# Patient Record
Sex: FEMALE | Race: WHITE | NOT HISPANIC OR LATINO | Employment: OTHER | RURAL
[De-identification: names, ages, dates, MRNs, and addresses within clinical notes are randomized per-mention and may not be internally consistent; named-entity substitution may affect disease eponyms.]

---

## 2017-01-09 ENCOUNTER — LAB (OUTPATIENT)
Dept: FAMILY MEDICINE CLINIC | Facility: CLINIC | Age: 72
End: 2017-01-09

## 2017-01-09 DIAGNOSIS — K21.9 GASTROESOPHAGEAL REFLUX DISEASE, ESOPHAGITIS PRESENCE NOT SPECIFIED: Chronic | ICD-10-CM

## 2017-01-09 DIAGNOSIS — E78.5 HYPERLIPIDEMIA, UNSPECIFIED HYPERLIPIDEMIA TYPE: Chronic | ICD-10-CM

## 2017-01-09 DIAGNOSIS — Z78.0 MENOPAUSE: Chronic | ICD-10-CM

## 2017-01-09 DIAGNOSIS — Z00.00 WELLNESS EXAMINATION: ICD-10-CM

## 2017-01-09 DIAGNOSIS — E55.9 UNSPECIFIED VITAMIN D DEFICIENCY: ICD-10-CM

## 2017-01-09 DIAGNOSIS — M85.80 OSTEOPENIA: Primary | Chronic | ICD-10-CM

## 2017-01-09 LAB
25(OH)D3+25(OH)D2 SERPL-MCNC: 20.7 NG/ML (ref 30–100)
ALBUMIN SERPL-MCNC: 4.9 G/DL (ref 3.5–5)
ALBUMIN/GLOB SERPL: 1.8 G/DL (ref 1.1–2.5)
ALP SERPL-CCNC: 83 U/L (ref 24–120)
ALT SERPL-CCNC: 48 U/L (ref 0–54)
AST SERPL-CCNC: 26 U/L (ref 7–45)
BASOPHILS # BLD AUTO: 0.04 10*3/MM3 (ref 0–0.2)
BASOPHILS NFR BLD AUTO: 0.7 % (ref 0–2)
BILIRUB SERPL-MCNC: 0.6 MG/DL (ref 0.1–1)
BUN SERPL-MCNC: 12 MG/DL (ref 5–21)
BUN/CREAT SERPL: 19 (ref 7–25)
CALCIUM SERPL-MCNC: 10.1 MG/DL (ref 8.4–10.4)
CHLORIDE SERPL-SCNC: 100 MMOL/L (ref 98–110)
CHOLEST SERPL-MCNC: 248 MG/DL (ref 130–200)
CO2 SERPL-SCNC: 27 MMOL/L (ref 24–31)
CREAT SERPL-MCNC: 0.63 MG/DL (ref 0.5–1.4)
EOSINOPHIL # BLD AUTO: 0.12 10*3/MM3 (ref 0–0.7)
EOSINOPHIL NFR BLD AUTO: 2.1 % (ref 0–4)
ERYTHROCYTE [DISTWIDTH] IN BLOOD BY AUTOMATED COUNT: 14 % (ref 12–15)
GLOBULIN SER CALC-MCNC: 2.7 GM/DL
GLUCOSE SERPL-MCNC: 96 MG/DL (ref 70–100)
HCT VFR BLD AUTO: 43.5 % (ref 37–47)
HDLC SERPL-MCNC: 55 MG/DL
HGB BLD-MCNC: 14.4 G/DL (ref 12–16)
LDLC SERPL CALC-MCNC: 160 MG/DL (ref 0–99)
LYMPHOCYTES # BLD AUTO: 1.68 10*3/MM3 (ref 0.72–4.86)
LYMPHOCYTES NFR BLD AUTO: 29.6 % (ref 15–45)
MCH RBC QN AUTO: 28.4 PG (ref 28–32)
MCHC RBC AUTO-ENTMCNC: 33.1 G/DL (ref 33–36)
MCV RBC AUTO: 85.8 FL (ref 82–98)
MONOCYTES # BLD AUTO: 0.39 10*3/MM3 (ref 0.19–1.3)
MONOCYTES NFR BLD AUTO: 6.9 % (ref 4–12)
NEUTROPHILS # BLD AUTO: 3.44 10*3/MM3 (ref 1.87–8.4)
NEUTROPHILS NFR BLD AUTO: 60.7 % (ref 39–78)
PLATELET # BLD AUTO: 255 10*3/MM3 (ref 130–400)
POTASSIUM SERPL-SCNC: 4 MMOL/L (ref 3.5–5.3)
PROT SERPL-MCNC: 7.6 G/DL (ref 6.3–8.7)
RBC # BLD AUTO: 5.07 10*6/MM3 (ref 4.2–5.4)
SODIUM SERPL-SCNC: 141 MMOL/L (ref 135–145)
TRIGL SERPL-MCNC: 166 MG/DL (ref 0–149)
TSH SERPL DL<=0.005 MIU/L-ACNC: 2.4 MIU/ML (ref 0.47–4.68)
VLDLC SERPL CALC-MCNC: 33.2 MG/DL
WBC # BLD AUTO: 5.67 10*3/MM3 (ref 4.8–10.8)

## 2017-01-09 NOTE — MR AVS SNAPSHOT
Nikki Santillan   2017 8:10 AM   Lab    Dept Phone:  [phone]   Encounter #:  [id number]    Provider:  LAURY [facility]   Department:  [facility] MEDICINE                Your Full Care Plan              Your Updated Medication List      Notice  As of 2017 10:00 AM    You have not been prescribed any medications.            We Performed the Following     CBC & Differential     Comprehensive Metabolic Panel     Lipid Panel     TSH     Vitamin D 25 Hydroxy       You Were Diagnosed With        Codes Comments    Osteopenia    -  Primary ICD-10-CM: M85.80  ICD-9-CM: 733.90     Gastroesophageal reflux disease, esophagitis presence not specified     ICD-10-CM: K21.9  ICD-9-CM: 530.81     Hyperlipidemia, unspecified hyperlipidemia type     ICD-10-CM: E78.5  ICD-9-CM: 272.4     Menopause     ICD-10-CM: Z78.0  ICD-9-CM: 627.2     Wellness examination     ICD-10-CM: Z00.00  ICD-9-CM: V70.0     Unspecified vitamin D deficiency     ICD-10-CM: E55.9  ICD-9-CM: 268.9       Instructions     None    Patient Instructions History      Upcoming Appointments     Visit Type Date Time Department    LAB 2017  8:10 AM [facility]    OFFICE VISIT 2017  9:30 AM [facility]      [employer] Signup     [facility] [employer] allows you to send messages to your doctor, view your test results, renew your prescriptions, schedule appointments, and more. To sign up, go to [employer] and click on the Sign Up Now link in the New User? box. Enter your [employer] Activation Code exactly as it appears below along with the last four digits of your Social Security Number and your Date of Birth () to complete the sign-up process. If you do not sign up before the expiration date, you must request a new code.    [employer] Activation Code: [email or web address]  Expires: 2017 10:00 AM    If you have questions, you can email [email or web address] or call [phone]  to talk to our MyChart staff. Remember, M-DISChart is NOT to be used for urgent needs. For medical emergencies, dial 911.               Other Info from Your Visit           Your Appointments     Jan 12, 2017  9:30 AM CST   Office Visit with Gael Carrillo MD   [facility]ICINE (--)    [address] [place] [id number] [phone]           Arrive 15 minutes prior to appointment.              Allergies     Not on File      Problems and Diagnoses Noted     Acid reflux disease    High cholesterol or triglycerides    Menopause    Bone disease    Wellness examination    Vitamin D deficiency

## 2017-01-11 ENCOUNTER — TELEPHONE (OUTPATIENT)
Dept: GASTROENTEROLOGY | Facility: CLINIC | Age: 72
End: 2017-01-11

## 2017-01-11 NOTE — TELEPHONE ENCOUNTER
----- Message from Christiano Neely sent at 1/4/2017  2:30 PM CST -----  Regarding: RECALL  PT CALLED SAYING THAT HER MOTHER IS SICK RIGHT NOW AND SHE CANNOT HAVE A SCOPE DONE AT THIS TIME. SHE WILL CALL WHEN SHE WANTS TO SCHEDULE IT, PER  MESSAGE.

## 2017-01-12 ENCOUNTER — OFFICE VISIT (OUTPATIENT)
Dept: FAMILY MEDICINE CLINIC | Facility: CLINIC | Age: 72
End: 2017-01-12

## 2017-01-12 VITALS
DIASTOLIC BLOOD PRESSURE: 80 MMHG | HEIGHT: 64 IN | TEMPERATURE: 98.6 F | WEIGHT: 145 LBS | BODY MASS INDEX: 24.75 KG/M2 | OXYGEN SATURATION: 97 % | HEART RATE: 72 BPM | RESPIRATION RATE: 16 BRPM | SYSTOLIC BLOOD PRESSURE: 140 MMHG

## 2017-01-12 DIAGNOSIS — M54.9 CHRONIC BACK PAIN, UNSPECIFIED BACK LOCATION, UNSPECIFIED BACK PAIN LATERALITY: Chronic | ICD-10-CM

## 2017-01-12 DIAGNOSIS — M85.80 OSTEOPENIA: Chronic | ICD-10-CM

## 2017-01-12 DIAGNOSIS — R93.89 ABNORMAL X-RAY: ICD-10-CM

## 2017-01-12 DIAGNOSIS — R05.9 COUGH: ICD-10-CM

## 2017-01-12 DIAGNOSIS — K21.9 GASTROESOPHAGEAL REFLUX DISEASE, ESOPHAGITIS PRESENCE NOT SPECIFIED: Primary | Chronic | ICD-10-CM

## 2017-01-12 DIAGNOSIS — F41.9 ANXIETY: Chronic | ICD-10-CM

## 2017-01-12 DIAGNOSIS — G89.29 CHRONIC BACK PAIN, UNSPECIFIED BACK LOCATION, UNSPECIFIED BACK PAIN LATERALITY: Chronic | ICD-10-CM

## 2017-01-12 DIAGNOSIS — E78.5 HYPERLIPIDEMIA, UNSPECIFIED HYPERLIPIDEMIA TYPE: Chronic | ICD-10-CM

## 2017-01-12 DIAGNOSIS — F32.A DEPRESSION, UNSPECIFIED DEPRESSION TYPE: Chronic | ICD-10-CM

## 2017-01-12 PROCEDURE — 99213 OFFICE O/P EST LOW 20 MIN: CPT | Performed by: FAMILY MEDICINE

## 2017-01-12 RX ORDER — PANTOPRAZOLE SODIUM 40 MG/1
40 TABLET, DELAYED RELEASE ORAL DAILY
COMMUNITY
End: 2017-01-12 | Stop reason: SDUPTHER

## 2017-01-12 RX ORDER — PRAVASTATIN SODIUM 40 MG
40 TABLET ORAL NIGHTLY
Qty: 90 TABLET | Refills: 1 | Status: SHIPPED | OUTPATIENT
Start: 2017-01-12 | End: 2017-08-09 | Stop reason: SDUPTHER

## 2017-01-12 RX ORDER — PANTOPRAZOLE SODIUM 40 MG/1
TABLET, DELAYED RELEASE ORAL
Qty: 90 TABLET | Refills: 1 | Status: SHIPPED | OUTPATIENT
Start: 2017-01-12 | End: 2017-07-16 | Stop reason: SDUPTHER

## 2017-01-12 RX ORDER — PRAVASTATIN SODIUM 40 MG
40 TABLET ORAL NIGHTLY
COMMUNITY
End: 2017-01-12 | Stop reason: SDUPTHER

## 2017-01-12 RX ORDER — ALBUTEROL SULFATE 90 UG/1
2 AEROSOL, METERED RESPIRATORY (INHALATION) AS NEEDED
COMMUNITY
End: 2019-01-28 | Stop reason: SDUPTHER

## 2017-01-12 RX ORDER — CITALOPRAM 20 MG/1
20 TABLET ORAL DAILY
Qty: 30 TABLET | Refills: 5 | Status: SHIPPED | OUTPATIENT
Start: 2017-01-12 | End: 2017-07-03 | Stop reason: SDUPTHER

## 2017-01-12 NOTE — PATIENT INSTRUCTIONS
Keep up with colonoscopy eventually  Let us know if Celexa does not help; it should  Vit D especially this time of year  Repeat xray chest due May/June

## 2017-01-12 NOTE — PROGRESS NOTES
Subjective   Nikki Santillan is a 71 y.o. female presenting with chief complaint of:   Chief Complaint   Patient presents with   • Heartburn   • Hyperlipidemia   • Osteopenia       History of Present Illness : Alone. Many chronic problems; interval appointment.  One includes hyperlipidemia.  She has treated this for years with pravachol; without problems. Labs are done at least yearly and show response.   She has been briefly out of Rx at time of lab below.     Other chronic problem/s to consider:  Chronic back pain:  The pain has been present for years/over a year.   It is chronic.   The pain is stable.  0-3 /10 pain most of time.  The pain limits activities.  The problem has been evaulated and the patient has no desire more testing/change in approach.  Has learned to live with the problem.   Osteoporosis:  This has been present for years/over a year.  It is chronic.   They are using the following medications:     The next Bone Density Test is will be  1-2 yrs  Abnormal lab/xray:    Cough:  Possible chronic. Pneumonia last year and coughed along time after that.  Still today on/off. Saw pulmonary; no definite opinion.  May have component of reflux; takes protonix chronically.      The following portions of the patient's history were reviewed and updated as appropriate: allergies, current medications, past family history, past medical history, past social history, past surgical history and problem list.  TCC migrated      Current Outpatient Prescriptions:   •  albuterol (PROVENTIL HFA;VENTOLIN HFA) 108 (90 BASE) MCG/ACT inhaler, Inhale 2 puffs 4 (Four) Times a Day As Needed for wheezing or shortness of air., Disp: , Rfl:   •  pantoprazole (PROTONIX) 40 MG EC tablet, Take 40 mg by mouth Daily., Disp: , Rfl:   •  pravastatin (PRAVACHOL) 40 MG tablet, Take 40 mg by mouth Every Night., Disp: , Rfl:     Allergies   Allergen Reactions   • Bacitracin-Neomycin-Polymyxin Itching   • Hydromorphone Hcl Other (See Comments)      Oxygen stats dropped & mental altered status   • Promethazine Hcl Anxiety   • Other Rash     Neodecadron eye drop     • Sulfacetamide-Prednisolone Rash         ROS:  GENERAL:  Active/slower with limits, speed, samni for age. Sleeps ok. No fever.  ENDO:  No syncope, near or diaphoretic sweaty spells.    HEENT: No head injury or headache,  No vision change, No hearing loss/pain/drainage.  No sore throat.  No nasal/sinus congestion/drainage. No epistaxis.  CHEST: No chest wall tenderness or mass. Frequent cough without wheeze, SOB or hemoptysis.  CV: No chest pain, palpatations, ankle edema.  GI: No heartburn, dysphagia, abdominal pain, diarrhea, constipation, rectal bleeding, or melena.  Colonoscopy+biop-polyp/LH/Bod/12.16.11/5y pp- she is aware; waiting till march    :  Voids without dysuria, or incontience to completion.  ORTHO: No painful/swollen joints but various on /off sore.  No change occ sore neck or back.  No acute neck or back pain without recent injury.   NEURO: No dizziness, weakness of extremities.  No numbness/parethesias.   PSYCH: No memory loss.  Mood both anxious, depressed; mother ill/in nursing home and using a lot of energy to take care of her.  Not suicidal.  Tolerated stress.   Review of Systems    Results for orders placed or performed in visit on 01/09/17   Vitamin D 25 Hydroxy   Result Value Ref Range    25 Hydroxy, Vitamin D 20.7 (L) 30.0 - 100.0 ng/mL   TSH   Result Value Ref Range    TSH 2.400 0.470 - 4.680 mIU/mL   Lipid Panel   Result Value Ref Range    Total Cholesterol 248 (H) 130 - 200 mg/dL    Triglycerides 166 (H) 0 - 149 mg/dL    HDL Cholesterol 55 >=50 mg/dL    VLDL Cholesterol 33.2 mg/dL    LDL Cholesterol  160 (H) 0 - 99 mg/dL   Comprehensive Metabolic Panel   Result Value Ref Range    Glucose 96 70 - 100 mg/dL    BUN 12 5 - 21 mg/dL    Creatinine 0.63 0.50 - 1.40 mg/dL    eGFR Non African Am 93 >60 mL/min/1.73    eGFR African Am 113 >60 mL/min/1.73    BUN/Creatinine Ratio  "19.0 7.0 - 25.0    Sodium 141 135 - 145 mmol/L    Potassium 4.0 3.5 - 5.3 mmol/L    Chloride 100 98 - 110 mmol/L    Total CO2 27.0 24.0 - 31.0 mmol/L    Calcium 10.1 8.4 - 10.4 mg/dL    Total Protein 7.6 6.3 - 8.7 g/dL    Albumin 4.90 3.50 - 5.00 g/dL    Globulin 2.7 gm/dL    A/G Ratio 1.8 1.1 - 2.5 g/dL    Total Bilirubin 0.6 0.1 - 1.0 mg/dL    Alkaline Phosphatase 83 24 - 120 U/L    AST (SGOT) 26 7 - 45 U/L    ALT (SGPT) 48 0 - 54 U/L   CBC & Differential   Result Value Ref Range    WBC 5.67 4.80 - 10.80 10*3/mm3    RBC 5.07 4.20 - 5.40 10*6/mm3    Hemoglobin 14.4 12.0 - 16.0 g/dL    Hematocrit 43.5 37.0 - 47.0 %    MCV 85.8 82.0 - 98.0 fL    MCH 28.4 28.0 - 32.0 pg    MCHC 33.1 33.0 - 36.0 g/dL    RDW 14.0 12.0 - 15.0 %    Platelets 255 130 - 400 10*3/mm3    Neutrophil Rel % 60.7 39.0 - 78.0 %    Lymphocyte Rel % 29.6 15.0 - 45.0 %    Monocyte Rel % 6.9 4.0 - 12.0 %    Eosinophil Rel % 2.1 0.0 - 4.0 %    Basophil Rel % 0.7 0.0 - 2.0 %    Neutrophils Absolute 3.44 1.87 - 8.40 10*3/mm3    Lymphocytes Absolute 1.68 0.72 - 4.86 10*3/mm3    Monocytes Absolute 0.39 0.19 - 1.30 10*3/mm3    Eosinophils Absolute 0.12 0.00 - 0.70 10*3/mm3    Basophils Absolute 0.04 0.00 - 0.20 10*3/mm3       No results found for: HGBA1C    Lab Results   Component Value Date     01/09/2017    K 4.0 01/09/2017     01/09/2017    CO2 27.0 01/09/2017    BUN 12 01/09/2017    CREATININE 0.63 01/09/2017    CALCIUM 10.1 01/09/2017       No results found for: PSA     Objective   Visit Vitals   • /80 (BP Location: Left arm, Patient Position: Sitting, Cuff Size: Adult)   • Pulse 72   • Temp 98.6 °F (37 °C) (Oral)   • Resp 16   • Ht 64\" (162.6 cm)   • Wt 145 lb (65.8 kg)   • SpO2 97%   • BMI 24.89 kg/m2       EXAM:  GENERAL:  Well nourished/developed  in no acute distress.   SKIN: Turgor excellent, without wound, rash, lesion.   HEENT: Normal cephalic without trauma.  Pupils equal round reactive to light. Extraocular motions full " without nystagmus.   External canals nonobstructive nontender without reddness. Tymphatic membranes jenny with celia structures intact.   Oral cavity without growths, exudates, and moist.  Posterior pharnyx without mass, obstruction, reddness.  No thyroidmegaly, mass, tenderness, lymphadenopathy and supple.   CV: Regular rhythm.  No murmur, gallop, edema. Posterior pulses intact.  No carotid bruits.   CHEST: No chest wall tenderness or mass.   LUNGS: Symmetric motion with clear to auscultation.  No dullness to percussion.   ABD: Soft, nontender without mass.   ORTHO: Symmetric extremities without swelling/point tenderness.  Full gross range of motion.    NEURO: CN 2-12 grossly intact.  Symmetric facies. 1/4 x 4 biceps, knees equal reflexes.  UE/LE   4-/5 strength throughout.  Nonfocal use extremities.   PSYCH: Oriented x 3.  Pleasant calm, well kept.  Purposeful/directed conservation with intact short/long gross memory.   Physical Exam  Assessment/Plan       1. Gastroesophageal reflux disease, esophagitis presence not specified  Part of cough?    2. Chronic back pain, unspecified back location, unspecified back pain laterality  DDD/DJD; getting by    3. Osteopenia  followed    4. Anxiety  On/off; worse lately  - citalopram (CELEXA) 20 MG tablet; Take 1 tablet by mouth Daily.  Dispense: 30 tablet; Refill: 5    5. Depression, unspecified depression type  same  - citalopram (CELEXA) 20 MG tablet; Take 1 tablet by mouth Daily.  Dispense: 30 tablet; Refill: 5    6. Hyperlipidemia, unspecified hyperlipidemia type  pravachol treated    7. Cough  ? Chronic; to follow    8. Abnormal x-ray  Benign to date; still following      Rx: reviewed/see above and:  LAB: reviewed past TCC as needed, above and new orders:  Wrap-up/other instructions  Patient Instructions   Keep up with colonoscopy eventually  Let us know if Celexa does not help; it should  Vit D especially this time of year  Repeat xray chest due May/June      Follow up:  Return in about 6 months (around 7/12/2017).

## 2017-01-12 NOTE — MR AVS SNAPSHOT
Nikki Santillan   1/12/2017 9:30 AM   Office Visit    Dept Phone:  [phone]   Encounter #:  [id number]    Provider:  Gael Carrillo MD   Department:  [facility]ILY MEDICINE                Your Full Care Plan              Today's Medication Changes          These changes are accurate as of: 1/12/17 10:28 AM.  If you have any questions, ask your nurse or doctor.               New Medication(s)Ordered:     citalopram 20 MG tablet   Commonly known as:  CELEXA   Take 1 tablet by mouth Daily.   Started by:  Gael Carrillo MD            Where to Get Your Medications      These medications were sent to [facility] [address] [place] [address] - [address] AT SEC of [address] & [facility] - [phone]  - [phone] FX  [address], [facility] [address]     Phone:  [phone]     citalopram 20 MG tablet                  Your Updated Medication List          This list is accurate as of: 1/12/17 10:28 AM.  Always use your most recent med list.                albuterol 108 (90 BASE) MCG/ACT inhaler   Commonly known as:  PROVENTIL HFA;VENTOLIN HFA       citalopram 20 MG tablet   Commonly known as:  CELEXA   Take 1 tablet by mouth Daily.       pantoprazole 40 MG EC tablet   Commonly known as:  PROTONIX       pravastatin 40 MG tablet   Commonly known as:  PRAVACHOL               You Were Diagnosed With        Codes Comments    Gastroesophageal reflux disease, esophagitis presence not specified    -  Primary ICD-10-CM: K21.9  ICD-9-CM: 530.81     Chronic back pain, unspecified back location, unspecified back pain laterality     ICD-10-CM: M54.9, G89.29  ICD-9-CM: 724.5, 338.29     Osteopenia     ICD-10-CM: M85.80  ICD-9-CM: 733.90     Anxiety     ICD-10-CM: F41.9  ICD-9-CM: 300.00     Depression, unspecified depression type     ICD-10-CM: F32.9  ICD-9-CM: 311     Hyperlipidemia, unspecified hyperlipidemia type     ICD-10-CM: E78.5  ICD-9-CM: 272.4     Cough      ICD-10-CM: R05  ICD-9-CM: 786.2     Abnormal x-ray     ICD-10-CM: R93.8  ICD-9-CM: 793.99       Instructions    Keep up with colonoscopy eventually  Let us know if Celexa does not help; it should  Vit D especially this time of year  Repeat xray chest due May/     Patient Instructions History      Upcoming Appointments     Visit Type Date Time Department    OFFICE VISIT 2017  9:30 AM [facility]    FOLLOW UP 2017 10:45 AM [facility]      [employer] Signup     [facility] [employer] allows you to send messages to your doctor, view your test results, renew your prescriptions, schedule appointments, and more. To sign up, go to [employer] and click on the Sign Up Now link in the New User? box. Enter your [employer] Activation Code exactly as it appears below along with the last four digits of your Social Security Number and your Date of Birth () to complete the sign-up process. If you do not sign up before the expiration date, you must request a new code.    [employer] Activation Code: [email or web address]  Expires: 2017 10:00 AM    If you have questions, you can email [email or web address] or call [phone] to talk to our [employer] staff. Remember, [employer] is NOT to be used for urgent needs. For medical emergencies, dial 911.               Other Info from Your Visit           Your Appointments     2017 10:45 AM CDT   Follow Up with Gael Carrillo MD   [facility] (--)    [address] [place] [id number] [phone]           Arrive 15 minutes prior to appointment.              Allergies     Bacitracin-neomycin-polymyxin  Itching    Hydromorphone Hcl  Other (See Comments)    Oxygen stats dropped & mental altered status    Promethazine Hcl  Anxiety    Other Allergy Rash    Neodecadron eye drop    Sulfacetamide-prednisolone  Rash      Reason for Visit     Heartburn     Hyperlipidemia     Osteopenia           Vital Signs   "   Blood Pressure Pulse Temperature Respirations Height Weight    140/80 (BP Location: Left arm, Patient Position: Sitting, Cuff Size: Adult) 72 98.6 °F (37 °C) (Oral) 16 64\" (162.6 cm) 145 lb (65.8 kg)    Oxygen Saturation Body Mass Index Smoking Status             97% 24.89 kg/m2 Former Smoker         Problems and Diagnoses Noted     Abnormal x-ray    Anxiety problem    Chronic back pain    Depression    Acid reflux disease    High cholesterol or triglycerides    Bone disease    Wellness examination    Cough            "

## 2017-06-19 ENCOUNTER — OFFICE VISIT (OUTPATIENT)
Dept: GASTROENTEROLOGY | Facility: CLINIC | Age: 72
End: 2017-06-19

## 2017-06-19 VITALS
BODY MASS INDEX: 25.61 KG/M2 | TEMPERATURE: 97.9 F | SYSTOLIC BLOOD PRESSURE: 124 MMHG | WEIGHT: 150 LBS | OXYGEN SATURATION: 98 % | HEART RATE: 73 BPM | HEIGHT: 64 IN | DIASTOLIC BLOOD PRESSURE: 84 MMHG

## 2017-06-19 DIAGNOSIS — Z86.010 HX OF COLONIC POLYP: Primary | ICD-10-CM

## 2017-06-19 PROBLEM — Z86.0100 HX OF COLONIC POLYP: Status: ACTIVE | Noted: 2017-06-19

## 2017-06-19 PROCEDURE — S0260 H&P FOR SURGERY: HCPCS | Performed by: NURSE PRACTITIONER

## 2017-06-19 NOTE — PROGRESS NOTES
[facility]    Primary Physician Gael Carrillo MD    6/19/2017    Nikki Santillan   1945      Chief Complaint   Patient presents with   • Colonoscopy       Subjective     HPI    Nikki Santillan is a 72 y.o. female who presents as a referral for preventative maintenance. She has no complaints of nausea or vomiting. No change in bowels. No wt loss. No BRBPR. No melena. No abdominal pain.   No family hx for colon cancer or colon polyps.  Last colonoscopy was 12/2011, by dr hendricks, had colon poylyp , path showed tubular adenoma, he rec recall in 5 years.     Past Medical History:   Diagnosis Date   • Cancer     breast   • Colon polyp    • Hyperlipidemia        Past Surgical History:   Procedure Laterality Date   • APPENDECTOMY     • COLONOSCOPY  12/16/2011   • DILATION AND CURETTAGE, DIAGNOSTIC / THERAPEUTIC     • MASTECTOMY      lumpectomy 2003 right and had xrt,  11/2004 right ,1/2012 left,    • TONSILLECTOMY         Outpatient Prescriptions Marked as Taking for the 6/19/17 encounter (Office Visit) with GIOVANNA Quintana   Medication Sig Dispense Refill   • albuterol (PROVENTIL HFA;VENTOLIN HFA) 108 (90 BASE) MCG/ACT inhaler Inhale 2 puffs As Needed for Wheezing or Shortness of Air.     • citalopram (CELEXA) 20 MG tablet Take 1 tablet by mouth Daily. 30 tablet 5   • pantoprazole (PROTONIX) 40 MG EC tablet TAKE 1 TABLET BY MOUTH EVERY DAY 90 tablet 1   • pravastatin (PRAVACHOL) 40 MG tablet Take 1 tablet by mouth Every Night. 90 tablet 1       Allergies   Allergen Reactions   • Bacitracin-Neomycin-Polymyxin Itching   • Dilaudid [Hydromorphone Hcl] Other (See Comments)     Oxygen stats dropped & mental altered status   • Promethazine Hcl Anxiety   • Ace Inhibitors    • Decadron [Dexamethasone]    • Neosporin [Neomycin-Bacitracin Zn-Polymyx]    • Phenergan [Promethazine]    • Sulfa Antibiotics    • Blephamide [Sulfacetamide-Prednisolone] Rash   • Other Rash     Neodecadron eye drop          Social History     Social History   • Marital status: Single     Spouse name:    • Number of children: 2   • Years of education: 13     Occupational History   • semi retired           Social History Main Topics   • Smoking status: Former Smoker     Packs/day: 0.50     Types: Cigarettes     Start date: 4/7/1962     Quit date: 1/12/1969   • Smokeless tobacco: Never Used   • Alcohol use No   • Drug use: No   • Sexual activity: Defer     Other Topics Concern   • Not on file     Social History Narrative       Family History   Problem Relation Age of Onset   • Colon cancer Neg Hx    • Colon polyps Neg Hx        Review of Systems   Constitutional: Negative for appetite change, chills, fatigue, fever and unexpected weight change.   HENT: Negative for sore throat and trouble swallowing.    Respiratory: Negative for cough, chest tightness, shortness of breath and wheezing.    Cardiovascular: Negative for chest pain and palpitations.   Gastrointestinal: Negative for abdominal distention, abdominal pain, anal bleeding, blood in stool, constipation, diarrhea, nausea, rectal pain and vomiting.        As mentioned in hpi   Genitourinary: Negative for difficulty urinating, dysuria and hematuria.   Musculoskeletal: Negative for arthralgias and back pain.   Skin: Negative for color change and rash.   Neurological: Negative for dizziness, tremors, seizures, syncope, light-headedness and headaches.   Hematological: Negative for adenopathy. Does not bruise/bleed easily.   Psychiatric/Behavioral: Negative for confusion. The patient is not nervous/anxious.        Objective     Vitals:    06/19/17 1041   BP: 124/84   Pulse: 73   Temp: 97.9 °F (36.6 °C)   SpO2: 98%     Last 2 weights    06/19/17  1041   Weight: 150 lb (68 kg)     Body mass index is 25.75 kg/(m^2).    Physical Exam   Constitutional: She is oriented to person, place, and time. She appears well-developed and well-nourished. No distress.   HENT:   Head:  Normocephalic and atraumatic.   Mouth/Throat: Oropharynx is clear and moist.   Eyes: Pupils are equal, round, and reactive to light. No scleral icterus.   Neck: Normal range of motion. Neck supple. No JVD present. No tracheal deviation present.   Cardiovascular: Normal rate, regular rhythm, normal heart sounds and intact distal pulses.  Exam reveals no gallop and no friction rub.    No murmur heard.  Pulmonary/Chest: Effort normal and breath sounds normal. No stridor. No respiratory distress. She has no wheezes. She has no rales.   Abdominal: Soft. Bowel sounds are normal. She exhibits no distension and no mass. There is no tenderness. There is no rebound and no guarding.   Musculoskeletal: Normal range of motion. She exhibits no edema or deformity.   Neurological: She is alert and oriented to person, place, and time.   Skin: Skin is warm and dry. No rash noted.   Psychiatric: She has a normal mood and affect. Her behavior is normal.   Vitals reviewed.      Imaging Results (most recent)     None          Assessment/Plan     Nikki was seen today for colonoscopy.    Diagnoses and all orders for this visit:    Hx of colonic polyp  -     Case Request; Standing  -     Case Request    Other orders  -     Implement Anesthesia Orders Day of Procedure; Standing  -     Obtain Informed Consent; Standing  -     Discontinue: polyethylene glycol (GOLYTELY) 236 G solution; Take 4,000 mL by mouth 1 (One) Time for 1 dose. Take as directed per instruction sheet.  -     Sod Picosulfate-Mag Ox-Cit Acd (PREPOPIK) 10-3.5-12 MG-GM-GM pack; Take 1 container by mouth 1 (One) Time for 1 dose. Take as directed per instruction sheet    plan for colonoscopy.     COLONOSCOPY WITH ANESTHESIA (N/A)  All risks, benefits, alternatives, and indications of colonoscopy procedure have been discussed with the patient. Risks to include perforation of the colon requiring possible surgery or colostomy, risk of bleeding from biopsies or removal of colon  tissue, possibility of missing a colon polyp or cancer, or adverse drug reaction.  Benefits to include the diagnosis and management of disease of the colon and rectum. Alternatives to include barium enema, radiographic evaluation, lab testing or no intervention. Pt verbalizes understanding and agrees.         GIOVANNA Denton      EMR Dragon/transcription disclaimer:  Much of this encounter note is electronic transcription/translation of spoken language to printed text.  The electronic translation of spoken language may be erroneous, or at times, nonsensical words or phrases may be inadvertently transcribed.  Although I have reviewed the note for such errors, some may still exist.

## 2017-07-03 DIAGNOSIS — F41.9 ANXIETY: Chronic | ICD-10-CM

## 2017-07-03 DIAGNOSIS — F32.A DEPRESSION, UNSPECIFIED DEPRESSION TYPE: Chronic | ICD-10-CM

## 2017-07-03 RX ORDER — CITALOPRAM 20 MG/1
TABLET ORAL
Qty: 30 TABLET | Refills: 5 | Status: SHIPPED | OUTPATIENT
Start: 2017-07-03 | End: 2017-12-27 | Stop reason: SDUPTHER

## 2017-07-07 DIAGNOSIS — R93.89 ABNORMAL X-RAY: Primary | ICD-10-CM

## 2017-07-11 DIAGNOSIS — R93.89 ABNORMAL X-RAY: ICD-10-CM

## 2017-07-17 RX ORDER — PANTOPRAZOLE SODIUM 40 MG/1
TABLET, DELAYED RELEASE ORAL
Qty: 90 TABLET | Refills: 1 | Status: SHIPPED | OUTPATIENT
Start: 2017-07-17 | End: 2018-01-11 | Stop reason: SDUPTHER

## 2017-07-20 ENCOUNTER — OFFICE VISIT (OUTPATIENT)
Dept: FAMILY MEDICINE CLINIC | Facility: CLINIC | Age: 72
End: 2017-07-20

## 2017-07-20 VITALS
OXYGEN SATURATION: 97 % | HEIGHT: 64 IN | TEMPERATURE: 98.7 F | SYSTOLIC BLOOD PRESSURE: 118 MMHG | BODY MASS INDEX: 25.78 KG/M2 | WEIGHT: 151 LBS | RESPIRATION RATE: 16 BRPM | DIASTOLIC BLOOD PRESSURE: 86 MMHG | HEART RATE: 84 BPM

## 2017-07-20 DIAGNOSIS — F41.9 ANXIETY: Chronic | ICD-10-CM

## 2017-07-20 DIAGNOSIS — E55.9 VITAMIN D DEFICIENCY: ICD-10-CM

## 2017-07-20 DIAGNOSIS — F32.A DEPRESSION, UNSPECIFIED DEPRESSION TYPE: Chronic | ICD-10-CM

## 2017-07-20 DIAGNOSIS — K21.9 GASTROESOPHAGEAL REFLUX DISEASE, ESOPHAGITIS PRESENCE NOT SPECIFIED: Chronic | ICD-10-CM

## 2017-07-20 DIAGNOSIS — R93.89 ABNORMAL X-RAY: ICD-10-CM

## 2017-07-20 DIAGNOSIS — E78.5 HYPERLIPIDEMIA, UNSPECIFIED HYPERLIPIDEMIA TYPE: Primary | Chronic | ICD-10-CM

## 2017-07-20 PROCEDURE — 99213 OFFICE O/P EST LOW 20 MIN: CPT | Performed by: FAMILY MEDICINE

## 2017-07-24 DIAGNOSIS — R93.89 ABNORMAL X-RAY: ICD-10-CM

## 2017-07-28 ENCOUNTER — ANESTHESIA (OUTPATIENT)
Dept: GASTROENTEROLOGY | Facility: HOSPITAL | Age: 72
End: 2017-07-28

## 2017-07-28 ENCOUNTER — HOSPITAL ENCOUNTER (OUTPATIENT)
Facility: HOSPITAL | Age: 72
Setting detail: HOSPITAL OUTPATIENT SURGERY
Discharge: HOME | End: 2017-07-28
Attending: INTERNAL MEDICINE | Admitting: INTERNAL MEDICINE

## 2017-07-28 ENCOUNTER — ANESTHESIA EVENT (OUTPATIENT)
Dept: GASTROENTEROLOGY | Facility: HOSPITAL | Age: 72
End: 2017-07-28

## 2017-07-28 VITALS
BODY MASS INDEX: 25.27 KG/M2 | RESPIRATION RATE: 14 BRPM | TEMPERATURE: 98.1 F | WEIGHT: 148 LBS | HEIGHT: 64 IN | DIASTOLIC BLOOD PRESSURE: 65 MMHG | HEART RATE: 66 BPM | OXYGEN SATURATION: 96 % | SYSTOLIC BLOOD PRESSURE: 136 MMHG

## 2017-07-28 DIAGNOSIS — Z86.010 HX OF COLONIC POLYP: ICD-10-CM

## 2017-07-28 PROCEDURE — 45385 COLONOSCOPY W/LESION REMOVAL: CPT | Performed by: INTERNAL MEDICINE

## 2017-07-28 PROCEDURE — 25010000002 PROPOFOL 10 MG/ML EMULSION: Performed by: NURSE ANESTHETIST, CERTIFIED REGISTERED

## 2017-07-28 PROCEDURE — 88305 TISSUE EXAM BY PATHOLOGIST: CPT | Performed by: INTERNAL MEDICINE

## 2017-07-28 RX ORDER — LIDOCAINE HYDROCHLORIDE 10 MG/ML
0.5 INJECTION, SOLUTION INFILTRATION; PERINEURAL ONCE AS NEEDED
Status: DISCONTINUED | OUTPATIENT
Start: 2017-07-28 | End: 2017-07-28 | Stop reason: RX

## 2017-07-28 RX ORDER — ONDANSETRON 2 MG/ML
4 INJECTION INTRAMUSCULAR; INTRAVENOUS ONCE AS NEEDED
Status: DISCONTINUED | OUTPATIENT
Start: 2017-07-28 | End: 2017-07-28 | Stop reason: HOSPADM

## 2017-07-28 RX ORDER — PROPOFOL 10 MG/ML
VIAL (ML) INTRAVENOUS AS NEEDED
Status: DISCONTINUED | OUTPATIENT
Start: 2017-07-28 | End: 2017-07-28 | Stop reason: SURG

## 2017-07-28 RX ORDER — SODIUM CHLORIDE 0.9 % (FLUSH) 0.9 %
3 SYRINGE (ML) INJECTION AS NEEDED
Status: DISCONTINUED | OUTPATIENT
Start: 2017-07-28 | End: 2017-07-28 | Stop reason: HOSPADM

## 2017-07-28 RX ORDER — SODIUM CHLORIDE 9 MG/ML
INJECTION, SOLUTION INTRAVENOUS CONTINUOUS PRN
Status: DISCONTINUED | OUTPATIENT
Start: 2017-07-28 | End: 2017-07-28 | Stop reason: SURG

## 2017-07-28 RX ORDER — SODIUM CHLORIDE 9 MG/ML
500 INJECTION, SOLUTION INTRAVENOUS CONTINUOUS PRN
Status: DISCONTINUED | OUTPATIENT
Start: 2017-07-28 | End: 2017-07-28 | Stop reason: HOSPADM

## 2017-07-28 RX ADMIN — SODIUM CHLORIDE: 9 INJECTION, SOLUTION INTRAVENOUS at 09:59

## 2017-07-28 RX ADMIN — PROPOFOL 150 MG: 10 INJECTION, EMULSION INTRAVENOUS at 10:20

## 2017-07-28 RX ADMIN — SODIUM CHLORIDE 500 ML: 9 INJECTION, SOLUTION INTRAVENOUS at 08:53

## 2017-07-28 RX ADMIN — PROPOFOL 200 MG: 10 INJECTION, EMULSION INTRAVENOUS at 10:09

## 2017-07-28 NOTE — ANESTHESIA PREPROCEDURE EVALUATION
Anesthesia Evaluation     Patient summary reviewed   no history of anesthetic complications:  NPO Solid Status: > 8 hours       Airway   Mallampati: II  TM distance: >3 FB  Neck ROM: full  Dental      Pulmonary    (-) asthma, sleep apnea, not a smoker  Cardiovascular   Exercise tolerance: good (4-7 METS)    (+) hyperlipidemia      Neuro/Psych  (-) seizures, CVA  GI/Hepatic/Renal/Endo    (+)  GERD,   (-) liver disease, no renal disease, diabetes    Musculoskeletal     Abdominal    Substance History      OB/GYN          Other                                        Anesthesia Plan    ASA 2     general     intravenous induction   Anesthetic plan and risks discussed with patient.

## 2017-07-28 NOTE — ANESTHESIA POSTPROCEDURE EVALUATION
Patient: Nikki Santillan    Procedure Summary     Date Anesthesia Start Anesthesia Stop Room / Location    07/28/17 1001 1034  [facility] /  [facility]       Procedure Diagnosis Surgeon Provider    COLONOSCOPY WITH ANESTHESIA (N/A ) Hx of colonic polyp  (Hx of colonic polyp [Z86.010]) MD Gala Abdullahi CRNA          Anesthesia Type: general  Last vitals  BP   112/54 (07/28/17 1050)    Temp        Pulse   66 (07/28/17 1050)   Resp   20 (07/28/17 1050)    SpO2   93 % (07/28/17 1050)      Post Anesthesia Care and Evaluation    Patient location during evaluation: PHASE II  Patient participation: complete - patient participated  Level of consciousness: awake and alert  Pain management: adequate  Airway patency: patent  Anesthetic complications: No anesthetic complications  PONV Status: none  Cardiovascular status: acceptable  Respiratory status: acceptable  Hydration status: acceptable

## 2017-07-31 LAB
LAB AP CASE REPORT: NORMAL
LAB AP CLINICAL INFORMATION: NORMAL
Lab: NORMAL
PATH REPORT.FINAL DX SPEC: NORMAL
PATH REPORT.GROSS SPEC: NORMAL

## 2017-08-09 DIAGNOSIS — E78.5 HYPERLIPIDEMIA, UNSPECIFIED HYPERLIPIDEMIA TYPE: Primary | Chronic | ICD-10-CM

## 2017-08-09 RX ORDER — PRAVASTATIN SODIUM 40 MG
40 TABLET ORAL NIGHTLY
Qty: 90 TABLET | Refills: 1 | Status: SHIPPED | OUTPATIENT
Start: 2017-08-09 | End: 2017-11-13 | Stop reason: SDUPTHER

## 2017-08-30 ENCOUNTER — OFFICE VISIT (OUTPATIENT)
Dept: SURGERY | Age: 72
End: 2017-08-30
Payer: MEDICARE

## 2017-08-30 VITALS
SYSTOLIC BLOOD PRESSURE: 118 MMHG | HEART RATE: 60 BPM | DIASTOLIC BLOOD PRESSURE: 80 MMHG | BODY MASS INDEX: 25.78 KG/M2 | WEIGHT: 151 LBS | HEIGHT: 64 IN

## 2017-08-30 DIAGNOSIS — Z85.3 PERSONAL HISTORY OF BREAST CANCER: Primary | ICD-10-CM

## 2017-08-30 PROCEDURE — 99212 OFFICE O/P EST SF 10 MIN: CPT | Performed by: PHYSICIAN ASSISTANT

## 2017-08-30 PROCEDURE — 1090F PRES/ABSN URINE INCON ASSESS: CPT | Performed by: PHYSICIAN ASSISTANT

## 2017-08-30 PROCEDURE — 4004F PT TOBACCO SCREEN RCVD TLK: CPT | Performed by: PHYSICIAN ASSISTANT

## 2017-08-30 PROCEDURE — G8419 CALC BMI OUT NRM PARAM NOF/U: HCPCS | Performed by: PHYSICIAN ASSISTANT

## 2017-08-30 PROCEDURE — 3017F COLORECTAL CA SCREEN DOC REV: CPT | Performed by: PHYSICIAN ASSISTANT

## 2017-08-30 PROCEDURE — 1123F ACP DISCUSS/DSCN MKR DOCD: CPT | Performed by: PHYSICIAN ASSISTANT

## 2017-08-30 PROCEDURE — G8427 DOCREV CUR MEDS BY ELIG CLIN: HCPCS | Performed by: PHYSICIAN ASSISTANT

## 2017-08-30 PROCEDURE — 3014F SCREEN MAMMO DOC REV: CPT | Performed by: PHYSICIAN ASSISTANT

## 2017-08-30 PROCEDURE — 4040F PNEUMOC VAC/ADMIN/RCVD: CPT | Performed by: PHYSICIAN ASSISTANT

## 2017-08-30 PROCEDURE — G8400 PT W/DXA NO RESULTS DOC: HCPCS | Performed by: PHYSICIAN ASSISTANT

## 2017-08-30 RX ORDER — CITALOPRAM 20 MG/1
TABLET ORAL
Refills: 5 | COMMUNITY
Start: 2017-08-01

## 2017-11-13 DIAGNOSIS — E78.5 HYPERLIPIDEMIA, UNSPECIFIED HYPERLIPIDEMIA TYPE: Chronic | ICD-10-CM

## 2017-11-13 RX ORDER — PRAVASTATIN SODIUM 40 MG
40 TABLET ORAL NIGHTLY
Qty: 90 TABLET | Refills: 1 | Status: SHIPPED | OUTPATIENT
Start: 2017-11-13 | End: 2018-05-29 | Stop reason: SDUPTHER

## 2017-12-27 DIAGNOSIS — F32.A DEPRESSION, UNSPECIFIED DEPRESSION TYPE: Chronic | ICD-10-CM

## 2017-12-27 DIAGNOSIS — F41.9 ANXIETY: Chronic | ICD-10-CM

## 2017-12-27 RX ORDER — CITALOPRAM 20 MG/1
TABLET ORAL
Qty: 30 TABLET | Refills: 5 | Status: SHIPPED | OUTPATIENT
Start: 2017-12-27 | End: 2018-06-22 | Stop reason: SDUPTHER

## 2018-01-11 RX ORDER — PANTOPRAZOLE SODIUM 40 MG/1
TABLET, DELAYED RELEASE ORAL
Qty: 90 TABLET | Refills: 1 | Status: SHIPPED | OUTPATIENT
Start: 2018-01-11 | End: 2018-07-12 | Stop reason: SDUPTHER

## 2018-02-12 ENCOUNTER — TELEPHONE (OUTPATIENT)
Dept: FAMILY MEDICINE CLINIC | Facility: CLINIC | Age: 73
End: 2018-02-12

## 2018-02-12 DIAGNOSIS — R93.89 ABNORMAL X-RAY: Primary | ICD-10-CM

## 2018-02-12 NOTE — TELEPHONE ENCOUNTER
Pt called and states she has apt to see Dr Carrillo 4/6/18 and needs a CT chest ordered before apt order done

## 2018-03-16 DIAGNOSIS — E55.9 VITAMIN D DEFICIENCY: ICD-10-CM

## 2018-03-16 DIAGNOSIS — E78.5 HYPERLIPIDEMIA, UNSPECIFIED HYPERLIPIDEMIA TYPE: Primary | Chronic | ICD-10-CM

## 2018-03-16 DIAGNOSIS — Z00.00 WELLNESS EXAMINATION: ICD-10-CM

## 2018-03-16 DIAGNOSIS — M85.80 OSTEOPENIA, UNSPECIFIED LOCATION: Chronic | ICD-10-CM

## 2018-03-16 DIAGNOSIS — F32.A DEPRESSION, UNSPECIFIED DEPRESSION TYPE: Chronic | ICD-10-CM

## 2018-03-16 PROBLEM — Z01.89 LABORATORY TEST: Status: ACTIVE | Noted: 2018-03-16

## 2018-03-22 ENCOUNTER — RESULTS ENCOUNTER (OUTPATIENT)
Dept: FAMILY MEDICINE CLINIC | Facility: CLINIC | Age: 73
End: 2018-03-22

## 2018-03-22 DIAGNOSIS — Z00.00 WELLNESS EXAMINATION: ICD-10-CM

## 2018-03-22 DIAGNOSIS — E55.9 VITAMIN D DEFICIENCY: ICD-10-CM

## 2018-03-22 DIAGNOSIS — E78.5 HYPERLIPIDEMIA, UNSPECIFIED HYPERLIPIDEMIA TYPE: Chronic | ICD-10-CM

## 2018-03-22 DIAGNOSIS — F32.A DEPRESSION, UNSPECIFIED DEPRESSION TYPE: Chronic | ICD-10-CM

## 2018-03-22 DIAGNOSIS — M85.80 OSTEOPENIA, UNSPECIFIED LOCATION: Chronic | ICD-10-CM

## 2018-03-22 LAB
25(OH)D3+25(OH)D2 SERPL-MCNC: 54.6 NG/ML (ref 30–100)
ALBUMIN SERPL-MCNC: 4.5 G/DL (ref 3.5–5)
ALBUMIN/GLOB SERPL: 1.9 G/DL (ref 1.1–2.5)
ALP SERPL-CCNC: 53 U/L (ref 24–120)
ALT SERPL-CCNC: 28 U/L (ref 0–54)
AST SERPL-CCNC: 22 U/L (ref 7–45)
BASOPHILS # BLD AUTO: 0.04 10*3/MM3 (ref 0–0.2)
BASOPHILS NFR BLD AUTO: 0.8 % (ref 0–2)
BILIRUB SERPL-MCNC: 0.5 MG/DL (ref 0.1–1)
BUN SERPL-MCNC: 15 MG/DL (ref 5–21)
BUN/CREAT SERPL: 23.4 (ref 7–25)
CALCIUM SERPL-MCNC: 10.1 MG/DL (ref 8.4–10.4)
CHLORIDE SERPL-SCNC: 101 MMOL/L (ref 98–110)
CHOLEST SERPL-MCNC: 193 MG/DL (ref 130–200)
CO2 SERPL-SCNC: 28 MMOL/L (ref 24–31)
CREAT SERPL-MCNC: 0.64 MG/DL (ref 0.5–1.4)
EOSINOPHIL # BLD AUTO: 0.09 10*3/MM3 (ref 0–0.7)
EOSINOPHIL NFR BLD AUTO: 1.9 % (ref 0–4)
ERYTHROCYTE [DISTWIDTH] IN BLOOD BY AUTOMATED COUNT: 12.9 % (ref 12–15)
GFR SERPLBLD CREATININE-BSD FMLA CKD-EPI: 110 ML/MIN/1.73
GFR SERPLBLD CREATININE-BSD FMLA CKD-EPI: 91 ML/MIN/1.73
GLOBULIN SER CALC-MCNC: 2.4 GM/DL
GLUCOSE SERPL-MCNC: 96 MG/DL (ref 70–100)
HCT VFR BLD AUTO: 43.2 % (ref 37–47)
HDLC SERPL-MCNC: 54 MG/DL
HGB BLD-MCNC: 13.9 G/DL (ref 12–16)
IMM GRANULOCYTES # BLD: 0.02 10*3/MM3 (ref 0–0.03)
IMM GRANULOCYTES NFR BLD: 0.4 % (ref 0–5)
LDLC SERPL CALC-MCNC: 113 MG/DL (ref 0–99)
LYMPHOCYTES # BLD AUTO: 1.4 10*3/MM3 (ref 0.72–4.86)
LYMPHOCYTES NFR BLD AUTO: 29.4 % (ref 15–45)
MCH RBC QN AUTO: 27.6 PG (ref 28–32)
MCHC RBC AUTO-ENTMCNC: 32.2 G/DL (ref 33–36)
MCV RBC AUTO: 85.9 FL (ref 82–98)
MONOCYTES # BLD AUTO: 0.38 10*3/MM3 (ref 0.19–1.3)
MONOCYTES NFR BLD AUTO: 8 % (ref 4–12)
NEUTROPHILS # BLD AUTO: 2.84 10*3/MM3 (ref 1.87–8.4)
NEUTROPHILS NFR BLD AUTO: 59.5 % (ref 39–78)
NRBC BLD AUTO-RTO: 0 /100 WBC (ref 0–0)
PLATELET # BLD AUTO: 199 10*3/MM3 (ref 130–400)
POTASSIUM SERPL-SCNC: 4.1 MMOL/L (ref 3.5–5.3)
PROT SERPL-MCNC: 6.9 G/DL (ref 6.3–8.7)
RBC # BLD AUTO: 5.03 10*6/MM3 (ref 4.2–5.4)
SODIUM SERPL-SCNC: 142 MMOL/L (ref 135–145)
TRIGL SERPL-MCNC: 131 MG/DL (ref 0–149)
TSH SERPL DL<=0.005 MIU/L-ACNC: 2.81 MIU/ML (ref 0.47–4.68)
VLDLC SERPL CALC-MCNC: 26.2 MG/DL
WBC # BLD AUTO: 4.77 10*3/MM3 (ref 4.8–10.8)

## 2018-03-27 DIAGNOSIS — R93.89 ABNORMAL X-RAY: ICD-10-CM

## 2018-04-06 ENCOUNTER — OFFICE VISIT (OUTPATIENT)
Dept: FAMILY MEDICINE CLINIC | Facility: CLINIC | Age: 73
End: 2018-04-06

## 2018-04-06 VITALS
DIASTOLIC BLOOD PRESSURE: 82 MMHG | TEMPERATURE: 99 F | HEART RATE: 76 BPM | WEIGHT: 155 LBS | OXYGEN SATURATION: 96 % | RESPIRATION RATE: 18 BRPM | BODY MASS INDEX: 26.46 KG/M2 | SYSTOLIC BLOOD PRESSURE: 134 MMHG | HEIGHT: 64 IN

## 2018-04-06 DIAGNOSIS — F41.9 ANXIETY: Chronic | ICD-10-CM

## 2018-04-06 DIAGNOSIS — Z90.13 STATUS POST MASTECTOMY, BILATERAL: ICD-10-CM

## 2018-04-06 DIAGNOSIS — R93.89 ABNORMAL X-RAY: ICD-10-CM

## 2018-04-06 DIAGNOSIS — K21.9 GASTROESOPHAGEAL REFLUX DISEASE, ESOPHAGITIS PRESENCE NOT SPECIFIED: Chronic | ICD-10-CM

## 2018-04-06 DIAGNOSIS — Z78.0 MENOPAUSE: Chronic | ICD-10-CM

## 2018-04-06 DIAGNOSIS — M25.531 RIGHT WRIST PAIN: Primary | ICD-10-CM

## 2018-04-06 DIAGNOSIS — F32.A DEPRESSION, UNSPECIFIED DEPRESSION TYPE: Chronic | ICD-10-CM

## 2018-04-06 DIAGNOSIS — M25.531 RIGHT WRIST PAIN: ICD-10-CM

## 2018-04-06 DIAGNOSIS — E78.5 HYPERLIPIDEMIA, UNSPECIFIED HYPERLIPIDEMIA TYPE: Chronic | ICD-10-CM

## 2018-04-06 DIAGNOSIS — Z85.3 HISTORY OF BREAST CANCER: ICD-10-CM

## 2018-04-06 PROCEDURE — 99214 OFFICE O/P EST MOD 30 MIN: CPT | Performed by: FAMILY MEDICINE

## 2018-04-06 RX ORDER — MELATONIN
1000 2 TIMES DAILY
COMMUNITY

## 2018-04-06 NOTE — PROGRESS NOTES
Subjective   Nikki Santillan is a 72 y.o. female presenting with chief complaint of:   Chief Complaint   Patient presents with   • Wrist Pain     R fell about 3 weeks ago    • Hyperlipidemia   • osteopenia   • Anxiety   • Depression       History of Present Illness :  Alone.   Has multiple chronic problems to consider that might have a bearing on today's issues;  an interval appointment.         Other chronic problem/s to consider:   Anxiety: This has been present for years/over a year.  It is chronic.  It is stable.  It is associated with stressors: mostly past still on/off.  Medications being used help a lot.  Medications/Rx change not requested.  Depression: This has been present for years/over a year.  It is chronic.  It is stable.  It is associated with stressors: same.   Medications being used help.  Medications/Rx change not requested.   No suicide ideation/intent.   GE reflux/heartburn: This has been present for years/over a year.  It is a chronic condition.   It is stable as there is no change in infrequent heartburn and no dysphagia.  Medication required to control symptoms. Protonix controlled  Hyperlipidemia: This has been present for years/over a year.  It is chronic.   It is generally controlled.   .  Labs are needed periodic to monitor condition/safetly.   Rx therapy Pravachol treated  Abnormal lab/xray: 2016 CT chest followed since; 2018 no changes so resolved.  No cough, wheeze, hemoptysis.   Hx breast cancer; bilateral.  Bilateral masectomy    Has an/another acute issue today: R wrist pain.  Fell forward; 2 weeks ago.  Still sore to move; not to touch distal forearm.  No swelling. .    The following portions of the patient's history were reviewed and updated as appropriate: allergies, current medications, past family history, past medical history, past social history, past surgical history and problem list.  Records acquired and reviewed; TCC migrated.      Current Outpatient Prescriptions:   •   albuterol (PROVENTIL HFA;VENTOLIN HFA) 108 (90 BASE) MCG/ACT inhaler, Inhale 2 puffs As Needed for Wheezing or Shortness of Air., Disp: , Rfl:   •  cholecalciferol (VITAMIN D3) 1000 units tablet, Take 1,000 Units by mouth 2 (Two) Times a Day., Disp: , Rfl:   •  citalopram (CeleXA) 20 MG tablet, TAKE 1 TABLET BY MOUTH DAILY, Disp: 30 tablet, Rfl: 5  •  pantoprazole (PROTONIX) 40 MG EC tablet, TAKE 1 TABLET BY MOUTH EVERY DAY, Disp: 90 tablet, Rfl: 1  •  pravastatin (PRAVACHOL) 40 MG tablet, Take 1 tablet by mouth Every Night., Disp: 90 tablet, Rfl: 1    No problems with medications.  Refills if needed done    Allergies   Allergen Reactions   • Bacitracin-Neomycin-Polymyxin Itching   • Dilaudid [Hydromorphone Hcl] Other (See Comments)     Oxygen stats dropped & mental altered status   • Promethazine Hcl Anxiety   • Ace Inhibitors    • Decadron [Dexamethasone]    • Neosporin [Neomycin-Bacitracin Zn-Polymyx]    • Phenergan [Promethazine]    • Sulfa Antibiotics    • Blephamide [Sulfacetamide-Prednisolone] Rash   • Other Rash     Neodecadron eye drop         Review of Systems  GENERAL:  Active/slower with limits, speed, stamina for age. Sleep is ok. No fever now.  SKIN: No  rash/skin lesion of concern:   ENDO:  No syncope, near or diaphoretic sweaty spells.  HEENT: No recent head injury with fall; rare/same headache,  No vision change, No significant hearing loss.  Ears without pain/drainage.  No sore throat.  No  significant nasal/sinus congestion/drainage. No epistaxis.  CHEST: No chest wall tenderness or mass. No cough,  without wheeze.  No SOB; no hemoptysis.  CV: No chest pain, palpitations, ankle edema.  GI: No heartburn, dysphagia.  No abdominal pain, diarrhea, constipation.  No rectal bleeding, or melena.    Colonoscopy+polyp/Bod/[facility]/11.28.06/5y  Colonoscopy+biop-polyp/[facility]/Bod/12.16.11/5y pp  Colon-polyp/[facility]/7.28.17/     :  Voids without dysuria, or  incontinence to completion.  ORTHO: No painful/swollen  joints but various on /off sore.  No sore neck or back.  No acute neck or back pain without recent injury.  NEURO: No dizziness, weakness of extremities.  No numbness/paresthesias.   PSYCH: No memory loss.  Mood good; occ anxious, depressed but/and not suicidal.  Tries to tolerate stress .     Results for orders placed or performed in visit on 03/22/18   Comprehensive Metabolic Panel   Result Value Ref Range    Glucose 96 70 - 100 mg/dL    BUN 15 5 - 21 mg/dL    Creatinine 0.64 0.50 - 1.40 mg/dL    eGFR Non African Am 91 >60 mL/min/1.73    eGFR African Am 110 >60 mL/min/1.73    BUN/Creatinine Ratio 23.4 7.0 - 25.0    Sodium 142 135 - 145 mmol/L    Potassium 4.1 3.5 - 5.3 mmol/L    Chloride 101 98 - 110 mmol/L    Total CO2 28.0 24.0 - 31.0 mmol/L    Calcium 10.1 8.4 - 10.4 mg/dL    Total Protein 6.9 6.3 - 8.7 g/dL    Albumin 4.50 3.50 - 5.00 g/dL    Globulin 2.4 gm/dL    A/G Ratio 1.9 1.1 - 2.5 g/dL    Total Bilirubin 0.5 0.1 - 1.0 mg/dL    Alkaline Phosphatase 53 24 - 120 U/L    AST (SGOT) 22 7 - 45 U/L    ALT (SGPT) 28 0 - 54 U/L   TSH   Result Value Ref Range    TSH 2.810 0.470 - 4.680 mIU/mL   Vitamin D 25 Hydroxy   Result Value Ref Range    25 Hydroxy, Vitamin D 54.6 30.0 - 100.0 ng/ml   Lipid Panel   Result Value Ref Range    Total Cholesterol 193 130 - 200 mg/dL    Triglycerides 131 0 - 149 mg/dL    HDL Cholesterol 54 >=50 mg/dL    VLDL Cholesterol 26.2 mg/dL    LDL Cholesterol  113 (H) 0 - 99 mg/dL   CBC & Differential   Result Value Ref Range    WBC 4.77 (L) 4.80 - 10.80 10*3/mm3    RBC 5.03 4.20 - 5.40 10*6/mm3    Hemoglobin 13.9 12.0 - 16.0 g/dL    Hematocrit 43.2 37.0 - 47.0 %    MCV 85.9 82.0 - 98.0 fL    MCH 27.6 (L) 28.0 - 32.0 pg    MCHC 32.2 (L) 33.0 - 36.0 g/dL    RDW 12.9 12.0 - 15.0 %    Platelets 199 130 - 400 10*3/mm3    Neutrophil Rel % 59.5 39.0 - 78.0 %    Lymphocyte Rel % 29.4 15.0 - 45.0 %    Monocyte Rel % 8.0 4.0 - 12.0 %    Eosinophil Rel % 1.9 0.0 - 4.0 %    Basophil Rel % 0.8 0.0 - 2.0  "%    Neutrophils Absolute 2.84 1.87 - 8.40 10*3/mm3    Lymphocytes Absolute 1.40 0.72 - 4.86 10*3/mm3    Monocytes Absolute 0.38 0.19 - 1.30 10*3/mm3    Eosinophils Absolute 0.09 0.00 - 0.70 10*3/mm3    Basophils Absolute 0.04 0.00 - 0.20 10*3/mm3    Immature Granulocyte Rel % 0.4 0.0 - 5.0 %    Immature Grans Absolute 0.02 0.00 - 0.03 10*3/mm3    nRBC 0.0 0.0 - 0.0 /100 WBC       No results found for: PSA     Lab Results:  CBC:    Lab Results - Last 18 Months  Lab Units 03/22/18  0721 01/09/17  0726   WBC 10*3/mm3 4.77* 5.67   HEMOGLOBIN g/dL 13.9 14.4   HEMATOCRIT % 43.2 43.5   PLATELETS 10*3/mm3 199 255      BMP/CMP:    Lab Results - Last 18 Months  Lab Units 03/22/18  0721 01/09/17  0726   SODIUM mmol/L 142 141   POTASSIUM mmol/L 4.1 4.0   CHLORIDE mmol/L 101 100   TOTAL CO2, ARTERIAL mmol/L 28.0 27.0   GLUCOSE mg/dL 96 96   BUN mg/dL 15 12   CREATININE mg/dL 0.64 0.63   EGFR IF NONAFRICN AM mL/min/1.73 91 93   EGFR IF AFRICN AM mL/min/1.73 110 113   CALCIUM mg/dL 10.1 10.1     HEPATIC:    Lab Results - Last 18 Months  Lab Units 03/22/18  0721 01/09/17  0726   ALT (SGPT) U/L 28 48   AST (SGOT) U/L 22 26   ALK PHOS U/L 53 83     THYROID:    Lab Results - Last 18 Months  Lab Units 03/22/18  0721 01/09/17  0726   TSH mIU/mL 2.810 2.400     A1C:No results for input(s): HGBA1C in the last 97541 hours.  PSA:No results for input(s): PSA in the last 95957 hours.    Objective   /82   Pulse 76   Temp 99 °F (37.2 °C) (Oral)   Resp 18   Ht 162.6 cm (64\")   Wt 70.3 kg (155 lb)   SpO2 96%   Breastfeeding? No   BMI 26.61 kg/m²   Body mass index is 26.61 kg/m².    Physical Exam  GENERAL:  Well nourished/developed in no acute distress.   SKIN: Turgor excellent, without wound, rash, lesion   HEENT: Normal cephalic without trauma.  Pupils equal round reactive to light. Extraocular motions full without nystagmus.   External canals nonobstructive nontender without reddness. Tymphatic membranes jenny with celia " structures intact.   Oral cavity without growths, exudates, and moist.  Posterior pharynx without mass, obstruction, redness.  No thyromegaly, mass, tenderness, lymphadenopathy and supple.  CV: Regular rhythm.  No murmur, gallop,  edema. Posterior pulses intact.  No carotid bruits.  CHEST: No chest wall tenderness or mass.   LUNGS: Symmetric motion with clear to auscultation.   ABD: Soft, nontender without mass.   ORTHO: Symmetric extremities without swelling/point tenderness.  Full gross range of motion.  R forearm/wrist symmetric to L.  FROM; only sore with resisted flexion/extenion and no pain to touch. .  NEURO: CN 2-12 grossly intact.  Symmetric facies and UE/LE. 3/5 strength throughout. 1/4 x bicep knee equal reflexes.  Nonfocal use extremities. Speech clear. Intact light touch with monofilament, vibratory sensation with tuning fork; equal toes/distal feet.    PSYCH: Oriented x 3.  Pleasant calm, well kept.  Purposeful/directed conservation with intact short/long gross memory.     Assessment/Plan     1. Right wrist pain    2. Hyperlipidemia, unspecified hyperlipidemia type    3. Gastroesophageal reflux disease, esophagitis presence not specified    4. Depression, unspecified depression type    5. Anxiety    6. Menopause    7. History of breast cancer    8. Status post mastectomy, bilateral    9. Abnormal x-ray- through 2018        Rx: reviewed/changes:  same    LAB/Testing/Referrals: reviewed/orders:   Today: none  Orders Placed This Encounter   Procedures   • XR Wrist 3+ View Right     Usual:   6m CBC  12m CBC, CMP, LIPID, TSH, Vit D,    Discussions:   Body mass index is 26.61 kg/m².   Discussed the patient's BMI with her. BMI is within normal parameters. No follow-up required.  Non-smoker      There are no Patient Instructions on file for this visit.    Follow up: Return for lab;, 6 m;, lab during/or just before next apt;, Dr Carrillo-, 12m;.  Future Appointments  Date Time Provider Department Center    10/9/2018 8:10 AM LAB [facility] None   4/5/2019 9:00 AM Gael Carrillo MD [facility] None

## 2018-05-29 DIAGNOSIS — E78.5 HYPERLIPIDEMIA, UNSPECIFIED HYPERLIPIDEMIA TYPE: Chronic | ICD-10-CM

## 2018-05-29 RX ORDER — PRAVASTATIN SODIUM 40 MG
40 TABLET ORAL NIGHTLY
Qty: 90 TABLET | Refills: 1 | Status: SHIPPED | OUTPATIENT
Start: 2018-05-29 | End: 2019-03-06 | Stop reason: SDUPTHER

## 2018-06-22 DIAGNOSIS — F32.A DEPRESSION, UNSPECIFIED DEPRESSION TYPE: Chronic | ICD-10-CM

## 2018-06-22 DIAGNOSIS — F41.9 ANXIETY: Chronic | ICD-10-CM

## 2018-06-22 RX ORDER — CITALOPRAM 20 MG/1
TABLET ORAL
Qty: 30 TABLET | Refills: 5 | Status: SHIPPED | OUTPATIENT
Start: 2018-06-22 | End: 2018-12-22 | Stop reason: SDUPTHER

## 2018-07-12 RX ORDER — PANTOPRAZOLE SODIUM 40 MG/1
TABLET, DELAYED RELEASE ORAL
Qty: 90 TABLET | Refills: 1 | Status: SHIPPED | OUTPATIENT
Start: 2018-07-12 | End: 2019-01-01 | Stop reason: SDUPTHER

## 2018-08-27 ENCOUNTER — OFFICE VISIT (OUTPATIENT)
Dept: FAMILY MEDICINE CLINIC | Facility: CLINIC | Age: 73
End: 2018-08-27

## 2018-08-27 VITALS
RESPIRATION RATE: 18 BRPM | SYSTOLIC BLOOD PRESSURE: 122 MMHG | TEMPERATURE: 98.6 F | HEART RATE: 77 BPM | OXYGEN SATURATION: 93 % | HEIGHT: 64 IN | BODY MASS INDEX: 27.19 KG/M2 | WEIGHT: 159.25 LBS | DIASTOLIC BLOOD PRESSURE: 80 MMHG

## 2018-08-27 DIAGNOSIS — K21.9 GASTROESOPHAGEAL REFLUX DISEASE, ESOPHAGITIS PRESENCE NOT SPECIFIED: Chronic | ICD-10-CM

## 2018-08-27 DIAGNOSIS — R05.9 COUGH: ICD-10-CM

## 2018-08-27 DIAGNOSIS — R93.89 ABNORMAL X-RAY: Primary | ICD-10-CM

## 2018-08-27 PROCEDURE — 99213 OFFICE O/P EST LOW 20 MIN: CPT | Performed by: FAMILY MEDICINE

## 2018-08-27 RX ORDER — METHYLPREDNISOLONE 4 MG/1
TABLET ORAL
Qty: 1 EACH | Refills: 0 | Status: SHIPPED | OUTPATIENT
Start: 2018-08-27 | End: 2019-01-28

## 2018-08-27 RX ORDER — BUDESONIDE AND FORMOTEROL FUMARATE DIHYDRATE 80; 4.5 UG/1; UG/1
2 AEROSOL RESPIRATORY (INHALATION)
Qty: 1 INHALER | Refills: 0 | Status: SHIPPED | OUTPATIENT
Start: 2018-08-27 | End: 2019-04-05

## 2018-08-27 RX ORDER — AMOXICILLIN AND CLAVULANATE POTASSIUM 875; 125 MG/1; MG/1
1 TABLET, FILM COATED ORAL EVERY 12 HOURS SCHEDULED
Qty: 14 TABLET | Refills: 0 | Status: SHIPPED | OUTPATIENT
Start: 2018-08-27 | End: 2018-10-08

## 2018-08-27 NOTE — PROGRESS NOTES
Subjective   Nikki Santillan is a 73 y.o. female presenting with chief complaint of:   Chief Complaint   Patient presents with   • Cough     Patient states that she has a cough that started out as a sore throat, fever and sinus drainage.       History of Present Illness :  Alone.  Has an acute issue today.  Onset 3 weeks ago cough/fever. Associated with sore throat, wheeze,,SOB,chest pain, hemoptysis ,nausea, nausea/vomiting,diarrhea.  No SOB, chest pain, hemoptysis, nausea, nausea/vomiting, diarrhea.      1. Abnormal x-ray- chest CT-resolved: chronic/in the past; resolved.    2. Cough: acute/above   3. Gastroesophageal reflux disease, esophagitis presence not specified: chronic/controlled heartburn, without dysphagia, choking.      Has an another acute issue today: none    The following portions of the patient's history were reviewed and updated as appropriate: allergies, current medications, past family history, past medical history, past social history, past surgical history and problem list.  Records acquired and reviewed; TCC migrated.      Current Outpatient Prescriptions:   •  albuterol (PROVENTIL HFA;VENTOLIN HFA) 108 (90 BASE) MCG/ACT inhaler, Inhale 2 puffs As Needed for Wheezing or Shortness of Air., Disp: , Rfl:   •  cholecalciferol (VITAMIN D3) 1000 units tablet, Take 1,000 Units by mouth 2 (Two) Times a Day., Disp: , Rfl:   •  citalopram (CeleXA) 20 MG tablet, TAKE 1 TABLET BY MOUTH DAILY, Disp: 30 tablet, Rfl: 5  •  pantoprazole (PROTONIX) 40 MG EC tablet, TAKE 1 TABLET BY MOUTH EVERY DAY, Disp: 90 tablet, Rfl: 1  •  pravastatin (PRAVACHOL) 40 MG tablet, Take 1 tablet by mouth Every Night., Disp: 90 tablet, Rfl: 1    No problems with medications.  Refills needed; done.     Allergies   Allergen Reactions   • Bacitracin-Neomycin-Polymyxin Itching   • Dilaudid [Hydromorphone Hcl] Other (See Comments)     Oxygen stats dropped & mental altered status   • Promethazine Hcl Anxiety   • Ace Inhibitors Other (See  Comments)     Family history of intolerance.   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 Sleep is ok; no orthopnea. No fever now.  SKIN: No rash/skin lesion of concern:   ENDO:  No syncope, near or diaphoretic sweaty spells.  HEENT: No recent head injury; or headache,  No vision change.  No significant hearing loss.  Ears without pain/drainage.  No sore throat.  No significant nasal/sinus congestion/drainage. No epistaxis.  CHEST: No chest wall tenderness or mass.  Increased cough,  without wheeze.  No SOB; no hemoptysis.  CV: No exertional chest pain, palpitations, ankle edema.  GI: No heartburn, dysphagia.  No abdominal pain, diarrhea, constipation.  No rectal bleeding, or melena.    :  Voids without dysuria, or  incontinence to completion.  ORTHO: No painful/swollen joints but various on /off sore.  No sore neck or back.  No acute neck or back pain without recent injury.  NEURO: No dizziness, weakness of extremities.  No numbness/paresthesias.   PSYCH: No memory loss.  Mood good; not anxious, depressed but/and not suicidal.  Tries to tolerate stress .     Results for orders placed or performed in visit on 03/22/18   Comprehensive Metabolic Panel   Result Value Ref Range    Glucose 96 70 - 100 mg/dL    BUN 15 5 - 21 mg/dL    Creatinine 0.64 0.50 - 1.40 mg/dL    eGFR Non African Am 91 >60 mL/min/1.73    eGFR African Am 110 >60 mL/min/1.73    BUN/Creatinine Ratio 23.4 7.0 - 25.0    Sodium 142 135 - 145 mmol/L    Potassium 4.1 3.5 - 5.3 mmol/L    Chloride 101 98 - 110 mmol/L    Total CO2 28.0 24.0 - 31.0 mmol/L    Calcium 10.1 8.4 - 10.4 mg/dL    Total Protein 6.9 6.3 - 8.7 g/dL    Albumin 4.50  3.50 - 5.00 g/dL    Globulin 2.4 gm/dL    A/G Ratio 1.9 1.1 - 2.5 g/dL    Total Bilirubin 0.5 0.1 - 1.0 mg/dL    Alkaline Phosphatase 53 24 - 120 U/L    AST (SGOT) 22 7 - 45 U/L    ALT (SGPT) 28 0 - 54 U/L   TSH   Result Value Ref Range    TSH 2.810 0.470 - 4.680 mIU/mL   Vitamin D 25 Hydroxy   Result Value Ref Range    25 Hydroxy, Vitamin D 54.6 30.0 - 100.0 ng/ml   Lipid Panel   Result Value Ref Range    Total Cholesterol 193 130 - 200 mg/dL    Triglycerides 131 0 - 149 mg/dL    HDL Cholesterol 54 >=50 mg/dL    VLDL Cholesterol 26.2 mg/dL    LDL Cholesterol  113 (H) 0 - 99 mg/dL   CBC & Differential   Result Value Ref Range    WBC 4.77 (L) 4.80 - 10.80 10*3/mm3    RBC 5.03 4.20 - 5.40 10*6/mm3    Hemoglobin 13.9 12.0 - 16.0 g/dL    Hematocrit 43.2 37.0 - 47.0 %    MCV 85.9 82.0 - 98.0 fL    MCH 27.6 (L) 28.0 - 32.0 pg    MCHC 32.2 (L) 33.0 - 36.0 g/dL    RDW 12.9 12.0 - 15.0 %    Platelets 199 130 - 400 10*3/mm3    Neutrophil Rel % 59.5 39.0 - 78.0 %    Lymphocyte Rel % 29.4 15.0 - 45.0 %    Monocyte Rel % 8.0 4.0 - 12.0 %    Eosinophil Rel % 1.9 0.0 - 4.0 %    Basophil Rel % 0.8 0.0 - 2.0 %    Neutrophils Absolute 2.84 1.87 - 8.40 10*3/mm3    Lymphocytes Absolute 1.40 0.72 - 4.86 10*3/mm3    Monocytes Absolute 0.38 0.19 - 1.30 10*3/mm3    Eosinophils Absolute 0.09 0.00 - 0.70 10*3/mm3    Basophils Absolute 0.04 0.00 - 0.20 10*3/mm3    Immature Granulocyte Rel % 0.4 0.0 - 5.0 %    Immature Grans Absolute 0.02 0.00 - 0.03 10*3/mm3    nRBC 0.0 0.0 - 0.0 /100 WBC       Recent Lab Results:  CBC:    Lab Results - Last 18 Months  Lab Units 03/22/18  0721   WBC 10*3/mm3 4.77*   HEMOGLOBIN g/dL 13.9   HEMATOCRIT % 43.2   PLATELETS 10*3/mm3 199      BMP/CMP:    Lab Results - Last 18 Months  Lab Units 03/22/18  0721   SODIUM mmol/L 142   POTASSIUM mmol/L 4.1   CHLORIDE mmol/L 101   TOTAL CO2, ARTERIAL mmol/L 28.0   GLUCOSE mg/dL 96   BUN mg/dL 15   CREATININE mg/dL 0.64   EGFR IF NONAFRICN AM mL/min/1.73 91   EGFR IF AFRICN  "AM mL/min/1.73 110   CALCIUM mg/dL 10.1     HEPATIC:    Lab Results - Last 18 Months  Lab Units 03/22/18  0721   ALT (SGPT) U/L 28   AST (SGOT) U/L 22   ALK PHOS U/L 53     THYROID:    Lab Results - Last 18 Months  Lab Units 03/22/18  0721   TSH mIU/mL 2.810     A1C:No results for input(s): HGBA1C in the last 00100 hours.  PSA:No results for input(s): PSA in the last 84012 hours.    Objective   /80 (BP Location: Left arm, Patient Position: Sitting, Cuff Size: Adult)   Pulse 77   Temp 98.6 °F (37 °C) (Oral)   Resp 18   Ht 162.6 cm (64\")   Wt 72.2 kg (159 lb 4 oz)   SpO2 93%   BMI 27.34 kg/m²     Physical Exam  GENERAL:  Well nourished/developed in no acute distress.   SKIN: Turgor excellent, without wound, rash, lesion.  HEENT: Normal cephalic without trauma.  Pupils equal round reactive to light. Extraocular motions full without nystagmus.   External canals nonobstructive nontender without reddness. Tymphatic membranes jenny with celia structures intact.   Oral cavity without growths, exudates, and moist.  Posterior pharynx without mass, obstruction, redness.  No thyromegaly, mass, tenderness, lymphadenopathy and supple.  CV: Regular rhythm.  No murmur, gallop,  edema. Posterior pulses intact.  No carotid bruits.  CHEST: No chest wall tenderness or mass.   LUNGS: Symmetric motion with clear to auscultation.  No dullness to percussion  ABD: Soft, nontender without mass.   ORTHO: Symmetric extremities without swelling/point tenderness.  Full gross range of motion.  NEURO: CN 2-12 grossly intact.  Symmetric facies and UE/LE. 3/5 strength throughout.  Nonfocal use extremities. Speech mildly hoarse.   PSYCH: Oriented x 3.  Pleasant calm, well kept.  Purposeful/directed conservation with intact short/long gross memory.     1. Abnormal x-ray- chest CT-resolved    2. Cough    3. Gastroesophageal reflux disease, esophagitis presence not specified        Assessment/Plan     1. Abnormal x-ray- chest " CT-resolved  resolved    2. Cough  acute  - MethylPREDNISolone (MEDROL, AKASH,) 4 MG tablet; Take as directed on package instructions.  Dispense: 1 each; Refill: 0  - budesonide-formoterol (SYMBICORT) 80-4.5 MCG/ACT inhaler; Inhale 2 puffs 2 (Two) Times a Day.  Dispense: 1 inhaler; Refill: 0  - amoxicillin-clavulanate (AUGMENTIN) 875-125 MG per tablet; Take 1 tablet by mouth Every 12 (Twelve) Hours.  Dispense: 14 tablet; Refill: 0    3. Gastroesophageal reflux disease, esophagitis presence not specified  Probably not part of        LAB/Testing/Referrals: reviewed/orders:   Today:  No orders of the defined types were placed in this encounter.      Discussions:   Body mass index is 27.34 kg/m².   Patient's Body mass index is 27.34 kg/m². BMI is within normal parameters. No follow-up required.  Non-smoker      There are no Patient Instructions on file for this visit.    Follow up: Return for lab;, Dr Carrillo-, as planned;.

## 2018-08-30 ENCOUNTER — OFFICE VISIT (OUTPATIENT)
Dept: SURGERY | Age: 73
End: 2018-08-30
Payer: MEDICARE

## 2018-08-30 VITALS — SYSTOLIC BLOOD PRESSURE: 130 MMHG | HEART RATE: 72 BPM | DIASTOLIC BLOOD PRESSURE: 84 MMHG

## 2018-08-30 DIAGNOSIS — Z85.3 PERSONAL HISTORY OF BREAST CANCER: Primary | ICD-10-CM

## 2018-08-30 PROCEDURE — G8419 CALC BMI OUT NRM PARAM NOF/U: HCPCS | Performed by: PHYSICIAN ASSISTANT

## 2018-08-30 PROCEDURE — 4040F PNEUMOC VAC/ADMIN/RCVD: CPT | Performed by: PHYSICIAN ASSISTANT

## 2018-08-30 PROCEDURE — 1101F PT FALLS ASSESS-DOCD LE1/YR: CPT | Performed by: PHYSICIAN ASSISTANT

## 2018-08-30 PROCEDURE — 3017F COLORECTAL CA SCREEN DOC REV: CPT | Performed by: PHYSICIAN ASSISTANT

## 2018-08-30 PROCEDURE — G8400 PT W/DXA NO RESULTS DOC: HCPCS | Performed by: PHYSICIAN ASSISTANT

## 2018-08-30 PROCEDURE — 4004F PT TOBACCO SCREEN RCVD TLK: CPT | Performed by: PHYSICIAN ASSISTANT

## 2018-08-30 PROCEDURE — 1123F ACP DISCUSS/DSCN MKR DOCD: CPT | Performed by: PHYSICIAN ASSISTANT

## 2018-08-30 PROCEDURE — 99212 OFFICE O/P EST SF 10 MIN: CPT | Performed by: PHYSICIAN ASSISTANT

## 2018-08-30 PROCEDURE — G8427 DOCREV CUR MEDS BY ELIG CLIN: HCPCS | Performed by: PHYSICIAN ASSISTANT

## 2018-08-30 PROCEDURE — 1090F PRES/ABSN URINE INCON ASSESS: CPT | Performed by: PHYSICIAN ASSISTANT

## 2018-08-30 RX ORDER — BUDESONIDE AND FORMOTEROL FUMARATE DIHYDRATE 80; 4.5 UG/1; UG/1
2 AEROSOL RESPIRATORY (INHALATION)
COMMUNITY
Start: 2018-08-27

## 2018-08-30 RX ORDER — AMOXICILLIN AND CLAVULANATE POTASSIUM 875; 125 MG/1; MG/1
1 TABLET, FILM COATED ORAL
COMMUNITY
Start: 2018-08-27

## 2018-08-30 RX ORDER — METHYLPREDNISOLONE 4 MG/1
TABLET ORAL
Refills: 0 | COMMUNITY
Start: 2018-08-27

## 2018-08-30 RX ORDER — MELATONIN
1000
COMMUNITY

## 2018-08-30 RX ORDER — CITALOPRAM 20 MG/1
TABLET ORAL
COMMUNITY
Start: 2018-06-22 | End: 2018-08-30 | Stop reason: SDUPTHER

## 2018-08-30 RX ORDER — PRAVASTATIN SODIUM 40 MG
40 TABLET ORAL
COMMUNITY
Start: 2018-05-29 | End: 2018-08-30 | Stop reason: SDUPTHER

## 2018-08-30 RX ORDER — PANTOPRAZOLE SODIUM 40 MG/1
TABLET, DELAYED RELEASE ORAL
Refills: 1 | COMMUNITY
Start: 2018-07-12

## 2018-10-08 DIAGNOSIS — Z00.00 WELLNESS EXAMINATION: Primary | ICD-10-CM

## 2018-10-09 ENCOUNTER — RESULTS ENCOUNTER (OUTPATIENT)
Dept: FAMILY MEDICINE CLINIC | Facility: CLINIC | Age: 73
End: 2018-10-09

## 2018-10-09 DIAGNOSIS — Z00.00 WELLNESS EXAMINATION: ICD-10-CM

## 2018-10-09 LAB
BASOPHILS # BLD AUTO: 0.06 10*3/MM3 (ref 0–0.2)
BASOPHILS NFR BLD AUTO: 0.9 % (ref 0–2)
EOSINOPHIL # BLD AUTO: 0.11 10*3/MM3 (ref 0–0.7)
EOSINOPHIL NFR BLD AUTO: 1.7 % (ref 0–4)
ERYTHROCYTE [DISTWIDTH] IN BLOOD BY AUTOMATED COUNT: 13 % (ref 12–15)
HCT VFR BLD AUTO: 42.1 % (ref 37–47)
HGB BLD-MCNC: 14.1 G/DL (ref 12–16)
IMM GRANULOCYTES # BLD: 0.03 10*3/MM3 (ref 0–0.03)
IMM GRANULOCYTES NFR BLD: 0.5 % (ref 0–5)
LYMPHOCYTES # BLD AUTO: 1.78 10*3/MM3 (ref 0.72–4.86)
LYMPHOCYTES NFR BLD AUTO: 27 % (ref 15–45)
MCH RBC QN AUTO: 28.8 PG (ref 28–32)
MCHC RBC AUTO-ENTMCNC: 33.5 G/DL (ref 33–36)
MCV RBC AUTO: 86.1 FL (ref 82–98)
MONOCYTES # BLD AUTO: 0.42 10*3/MM3 (ref 0.19–1.3)
MONOCYTES NFR BLD AUTO: 6.4 % (ref 4–12)
NEUTROPHILS # BLD AUTO: 4.2 10*3/MM3 (ref 1.87–8.4)
NEUTROPHILS NFR BLD AUTO: 63.5 % (ref 39–78)
NRBC BLD AUTO-RTO: 0 /100 WBC (ref 0–0)
PLATELET # BLD AUTO: 224 10*3/MM3 (ref 130–400)
RBC # BLD AUTO: 4.89 10*6/MM3 (ref 4.2–5.4)
WBC # BLD AUTO: 6.6 10*3/MM3 (ref 4.8–10.8)

## 2018-12-22 DIAGNOSIS — F41.9 ANXIETY: Chronic | ICD-10-CM

## 2018-12-22 DIAGNOSIS — F32.A DEPRESSION, UNSPECIFIED DEPRESSION TYPE: Chronic | ICD-10-CM

## 2018-12-26 RX ORDER — CITALOPRAM 20 MG/1
TABLET ORAL
Qty: 30 TABLET | Refills: 5 | Status: SHIPPED | OUTPATIENT
Start: 2018-12-26 | End: 2019-08-04 | Stop reason: SDUPTHER

## 2019-01-02 RX ORDER — PANTOPRAZOLE SODIUM 40 MG/1
TABLET, DELAYED RELEASE ORAL
Qty: 90 TABLET | Refills: 0 | Status: SHIPPED | OUTPATIENT
Start: 2019-01-02 | End: 2019-04-02 | Stop reason: SDUPTHER

## 2019-01-28 ENCOUNTER — OFFICE VISIT (OUTPATIENT)
Dept: FAMILY MEDICINE CLINIC | Facility: CLINIC | Age: 74
End: 2019-01-28

## 2019-01-28 VITALS
DIASTOLIC BLOOD PRESSURE: 80 MMHG | RESPIRATION RATE: 18 BRPM | HEART RATE: 103 BPM | BODY MASS INDEX: 26.12 KG/M2 | TEMPERATURE: 100.9 F | OXYGEN SATURATION: 90 % | HEIGHT: 64 IN | SYSTOLIC BLOOD PRESSURE: 140 MMHG | WEIGHT: 153 LBS

## 2019-01-28 DIAGNOSIS — J32.9 SINUSITIS, UNSPECIFIED CHRONICITY, UNSPECIFIED LOCATION: ICD-10-CM

## 2019-01-28 DIAGNOSIS — J40 BRONCHITIS: ICD-10-CM

## 2019-01-28 DIAGNOSIS — R05.9 COUGH: ICD-10-CM

## 2019-01-28 PROCEDURE — 99213 OFFICE O/P EST LOW 20 MIN: CPT | Performed by: FAMILY MEDICINE

## 2019-01-28 RX ORDER — METHYLPREDNISOLONE 4 MG/1
TABLET ORAL
Qty: 1 EACH | Refills: 0 | Status: SHIPPED | OUTPATIENT
Start: 2019-01-28 | End: 2019-03-05

## 2019-01-28 RX ORDER — AMOXICILLIN AND CLAVULANATE POTASSIUM 875; 125 MG/1; MG/1
1 TABLET, FILM COATED ORAL 2 TIMES DAILY
Qty: 20 TABLET | Refills: 0 | Status: SHIPPED | OUTPATIENT
Start: 2019-01-28 | End: 2019-04-05

## 2019-01-28 RX ORDER — ALBUTEROL SULFATE 90 UG/1
2 AEROSOL, METERED RESPIRATORY (INHALATION) AS NEEDED
Qty: 1 INHALER | Refills: 3 | Status: SHIPPED | OUTPATIENT
Start: 2019-01-28

## 2019-01-28 NOTE — PATIENT INSTRUCTIONS
Ask your pharmacist if any equal to symbicort cheaper.      ########################    When using steroids  A. Do not use anti-inflammatories such as Motrin/ibuprofen, Alleve/naprosyn, Mobic and like medications

## 2019-01-28 NOTE — PROGRESS NOTES
Subjective   Nikki Santillan is a 73 y.o. female presenting with chief complaint of:   Chief Complaint   Patient presents with   • Illness     dry severe cough per several days       History of Present Illness :  Alone.  Has an acute issue today.  Onset 3 days ago sinus/nasal congestion-fullness with nares/posterior pharyngeal drainage. Associated with fever, sore throat, cough, wheeze.  No SOB, chest pain, hemoptysis, nausea, nausea/vomiting, diarrhea.  Helping old friend in [facility] are that will likely die this week from pancreatic cancer.    Other illness/problems that could have a bearing on this acute issue today which are reviewed/considered:     1. Cough: acute/above   2. Sinusitis, unspecified chronicity, unspecified location: acute/above   3. Bronchitis: acute/above     Has an another acute issue today: none    The following portions of the patient's history were reviewed and updated as appropriate: allergies, current medications, past family history, past medical history, past social history, past surgical history and problem list.      Current Outpatient Medications:   •  albuterol (PROVENTIL HFA;VENTOLIN HFA) 108 (90 BASE) MCG/ACT inhaler, Inhale 2 puffs As Needed for Wheezing or Shortness of Air., Disp: , Rfl:   •  cholecalciferol (VITAMIN D3) 1000 units tablet, Take 1,000 Units by mouth 2 (Two) Times a Day., Disp: , Rfl:   •  citalopram (CeleXA) 20 MG tablet, TAKE 1 TABLET BY MOUTH DAILY, Disp: 30 tablet, Rfl: 5  •  pantoprazole (PROTONIX) 40 MG EC tablet, TAKE 1 TABLET BY MOUTH EVERY DAY, Disp: 90 tablet, Rfl: 0  •  pravastatin (PRAVACHOL) 40 MG tablet, Take 1 tablet by mouth Every Night., Disp: 90 tablet, Rfl: 1    No problems with medications.  Refills needed; done.     Allergies   Allergen Reactions   • Bacitracin-Neomycin-Polymyxin Itching   • Dilaudid [Hydromorphone Hcl] Other (See Comments)     Oxygen stats dropped & mental altered status   • Promethazine Hcl Anxiety   • Ace Inhibitors Other (See  Comments)     Family history of intolerance.   • Blephamide [Sulfacetamide-Prednisolone] Rash   • Neosporin [Neomycin-Bacitracin Zn-Polymyx] Rash   • Other Rash     Neodecadron eye drop     • Phenergan [Promethazine] Anxiety   • Sulfa Antibiotics Rash       Review of Systems  GENERAL: fatigued with this.  Sleep without orthopnea/sob.  SKIN: No rash  ENDO: Non-DM  ENT: Mild HA.  Sinus/nasal congestion, drainage mod.  No epistaxis.   CHEST: Sore/diffuse. Mostly dry cough without wheeze.  No SOB.  No hemoptysis.   CV: No palpitations, leg edema.   GI: No nausea, vomiting, diarrhea.        Results for orders placed or performed in visit on 10/09/18   CBC & Differential   Result Value Ref Range    WBC 6.60 4.80 - 10.80 10*3/mm3    RBC 4.89 4.20 - 5.40 10*6/mm3    Hemoglobin 14.1 12.0 - 16.0 g/dL    Hematocrit 42.1 37.0 - 47.0 %    MCV 86.1 82.0 - 98.0 fL    MCH 28.8 28.0 - 32.0 pg    MCHC 33.5 33.0 - 36.0 g/dL    RDW 13.0 12.0 - 15.0 %    Platelets 224 130 - 400 10*3/mm3    Neutrophil Rel % 63.5 39.0 - 78.0 %    Lymphocyte Rel % 27.0 15.0 - 45.0 %    Monocyte Rel % 6.4 4.0 - 12.0 %    Eosinophil Rel % 1.7 0.0 - 4.0 %    Basophil Rel % 0.9 0.0 - 2.0 %    Neutrophils Absolute 4.20 1.87 - 8.40 10*3/mm3    Lymphocytes Absolute 1.78 0.72 - 4.86 10*3/mm3    Monocytes Absolute 0.42 0.19 - 1.30 10*3/mm3    Eosinophils Absolute 0.11 0.00 - 0.70 10*3/mm3    Basophils Absolute 0.06 0.00 - 0.20 10*3/mm3    Immature Granulocyte Rel % 0.5 0.0 - 5.0 %    Immature Grans Absolute 0.03 0.00 - 0.03 10*3/mm3    nRBC 0.0 0.0 - 0.0 /100 WBC       Recent Lab Results:  CBC:  Lab Results - Last 18 Months   Lab Units 10/09/18  0704 03/22/18  0721   WBC 10*3/mm3 6.60 4.77*   HEMOGLOBIN g/dL 14.1 13.9   HEMATOCRIT % 42.1 43.2   PLATELETS 10*3/mm3 224 199      BMP/CMP:  Lab Results - Last 18 Months   Lab Units 03/22/18  0721   SODIUM mmol/L 142   POTASSIUM mmol/L 4.1   CHLORIDE mmol/L 101   TOTAL CO2 mmol/L 28.0   GLUCOSE mg/dL 96   BUN mg/dL 15  "  CREATININE mg/dL 0.64   EGFR IF NONAFRICN AM mL/min/1.73 91   EGFR IF AFRICN AM mL/min/1.73 110   CALCIUM mg/dL 10.1     HEPATIC:  Lab Results - Last 18 Months   Lab Units 03/22/18  0721   ALT (SGPT) U/L 28   AST (SGOT) U/L 22   ALK PHOS U/L 53     THYROID:  Lab Results - Last 18 Months   Lab Units 03/22/18  0721   TSH mIU/mL 2.810     A1C:No results for input(s): HGBA1C in the last 50012 hours.  PSA:No results for input(s): PSA in the last 53461 hours.    Objective   /80 (BP Location: Right arm, Patient Position: Sitting, Cuff Size: Adult)   Pulse 103   Temp (!) 100.9 °F (38.3 °C) (Oral)   Resp 18   Ht 162.6 cm (64\")   Wt 69.4 kg (153 lb)   SpO2 90%   BMI 26.26 kg/m²     Physical Exam  GENERAL: Appropriate, NAD  SKIN: No rash, turgor ok.   ENT: Mildly dull TMs with bony structures intact.  Nasal mucosa reddnes and mild mucoid drainage.  Throat without mass but mildly red diffuse.   CHEST: Clear throught.  No dullness to percussion  CV: Regular without murmur, edema  GI: Soft with mass, tenderness.     Assessment/Plan   1. Cough    2. Sinusitis, unspecified chronicity, unspecified location    3. Bronchitis        LAB/Testing/Referrals: reviewed/orders:   Today:  No orders of the defined types were placed in this encounter.    Usual above/same.    Rx above and:   New Medications Ordered This Visit   Medications   • MethylPREDNISolone (MEDROL, AKASH,) 4 MG tablet     Sig: Take as directed on package instructions.     Dispense:  1 each     Refill:  0     Pharmacist-tell patient When using steroids Do not use anti-inflammatories such as Motrin/ibuprofen, Alleve/naprosyn, Mobic and like medications   • amoxicillin-clavulanate (AUGMENTIN) 875-125 MG per tablet     Sig: Take 1 tablet by mouth 2 (Two) Times a Day.     Dispense:  20 tablet     Refill:  0   • albuterol sulfate  (90 Base) MCG/ACT inhaler     Sig: Inhale 2 puffs As Needed for Wheezing or Shortness of Air.     Dispense:  1 inhaler     Refill: "  3       OTC analgesics as needed  OTC cough advised  OTC nasal spray 1-3 days advised as needed and no longer than that    Other  Vaporizer as needed  Fluids emphasis encouraged; calories optional for few days  Rest till fatigue better    Discussions:   Body mass index is 26.26 kg/m².   Patient's Body mass index is 26.26 kg/m². BMI is within normal parameters. No follow-up required..  Non-smoker      Patient Instructions   Ask your pharmacist if any equal to symbicort cheaper.      ########################    When using steroids  A. Do not use anti-inflammatories such as Motrin/ibuprofen, Alleve/naprosyn, Mobic and like medications        Follow up: Return for lab;, Dr Carrillo-, as planned;.

## 2019-03-05 ENCOUNTER — OFFICE VISIT (OUTPATIENT)
Dept: FAMILY MEDICINE CLINIC | Facility: CLINIC | Age: 74
End: 2019-03-05

## 2019-03-05 VITALS
HEIGHT: 64 IN | DIASTOLIC BLOOD PRESSURE: 70 MMHG | WEIGHT: 156 LBS | SYSTOLIC BLOOD PRESSURE: 140 MMHG | BODY MASS INDEX: 26.63 KG/M2 | RESPIRATION RATE: 18 BRPM | TEMPERATURE: 99 F

## 2019-03-05 DIAGNOSIS — F41.9 ANXIETY: Chronic | ICD-10-CM

## 2019-03-05 DIAGNOSIS — K21.9 GASTROESOPHAGEAL REFLUX DISEASE, ESOPHAGITIS PRESENCE NOT SPECIFIED: Chronic | ICD-10-CM

## 2019-03-05 DIAGNOSIS — M85.80 OSTEOPENIA, UNSPECIFIED LOCATION: Chronic | ICD-10-CM

## 2019-03-05 DIAGNOSIS — Z78.0 MENOPAUSE: Chronic | ICD-10-CM

## 2019-03-05 DIAGNOSIS — R05.9 COUGH: ICD-10-CM

## 2019-03-05 DIAGNOSIS — J32.9 SINUSITIS, UNSPECIFIED CHRONICITY, UNSPECIFIED LOCATION: ICD-10-CM

## 2019-03-05 DIAGNOSIS — E78.5 HYPERLIPIDEMIA, UNSPECIFIED HYPERLIPIDEMIA TYPE: Primary | Chronic | ICD-10-CM

## 2019-03-05 DIAGNOSIS — E55.9 VITAMIN D DEFICIENCY: ICD-10-CM

## 2019-03-05 PROCEDURE — 99213 OFFICE O/P EST LOW 20 MIN: CPT | Performed by: FAMILY MEDICINE

## 2019-03-05 RX ORDER — AMOXICILLIN AND CLAVULANATE POTASSIUM 875; 125 MG/1; MG/1
1 TABLET, FILM COATED ORAL 2 TIMES DAILY
Qty: 20 TABLET | Refills: 0 | Status: SHIPPED | OUTPATIENT
Start: 2019-03-05 | End: 2019-04-05

## 2019-03-05 RX ORDER — AZELASTINE 1 MG/ML
2 SPRAY, METERED NASAL 2 TIMES DAILY
Qty: 30 ML | Refills: 0 | Status: SHIPPED | OUTPATIENT
Start: 2019-03-05 | End: 2019-04-05

## 2019-03-05 RX ORDER — FLUTICASONE PROPIONATE 50 MCG
2 SPRAY, SUSPENSION (ML) NASAL DAILY
Qty: 1 BOTTLE | Refills: 0 | Status: SHIPPED | OUTPATIENT
Start: 2019-03-05 | End: 2019-04-02 | Stop reason: SDUPTHER

## 2019-03-05 RX ORDER — METHYLPREDNISOLONE 4 MG/1
TABLET ORAL
Qty: 1 EACH | Refills: 0 | Status: SHIPPED | OUTPATIENT
Start: 2019-03-05 | End: 2019-04-05

## 2019-03-05 NOTE — PROGRESS NOTES
Subjective   Nikki Santillan is a 73 y.o. female presenting with chief complaint of:   Chief Complaint   Patient presents with   • Cough     cant seem to get rid of it, causing her to vomit   • Fatigue     alot of stress        History of Present Illness :  Alone.  Here for primarily an acute issue today; continued cough, sinus.       Here 1.28.19:   Has an acute issue today.  Onset 3 days ago sinus/nasal congestion-fullness with nares/posterior pharyngeal drainage. Associated with fever, sore throat, cough, wheeze.  No SOB, chest pain, hemoptysis, nausea, nausea/vomiting, diarrhea.  Helping old friend in [facility] are that will likely die this week from pancreatic cancer  Assessment/Plan   1. Cough    2. Sinusitis, unspecified chronicity, unspecified location    3. Bronchitis    Give Augmentin 875 bid #20 and medrol dose pack.     No fever, but continues with sinus drainage/cough.  Cough is mostly dry occasionally productive of yellow brown bloody material.  Occasionally there is a feeling of wheeze; with no shortness of breath choking or cyanosis.  Sinus drainage and congestion at the same time; bilateral nostrils.  Recalls previously having to have sinus polyps removed in the past.    Has multiple chronic problems to consider that might have a bearing on today's issues;  an interval appointment.       Chronic/acute problems reviewed today:   1. Gastroesophageal reflux disease, esophagitis presence not specified: Chronic/stable.  Controlled heartburn, reflux; no dysphagia, melena.  Rx protonix helps.  Doubts part of cough.      2. Cough: acute-subacute above.     3. Sinusitis, unspecified chronicity, unspecified location acute-subacute above.      Has an/another acute issue today: none.    The following portions of the patient's history were reviewed and updated as appropriate: allergies, current medications, past family history, past medical history, past social history, past surgical history and problem list.      Current  Outpatient Medications:   •  albuterol sulfate  (90 Base) MCG/ACT inhaler, Inhale 2 puffs As Needed for Wheezing or Shortness of Air., Disp: 1 inhaler, Rfl: 3  •  cholecalciferol (VITAMIN D3) 1000 units tablet, Take 1,000 Units by mouth 2 (Two) Times a Day., Disp: , Rfl:   •  citalopram (CeleXA) 20 MG tablet, TAKE 1 TABLET BY MOUTH DAILY, Disp: 30 tablet, Rfl: 5  •  pantoprazole (PROTONIX) 40 MG EC tablet, TAKE 1 TABLET BY MOUTH EVERY DAY, Disp: 90 tablet, Rfl: 0  •  pravastatin (PRAVACHOL) 40 MG tablet, Take 1 tablet by mouth Every Night., Disp: 90 tablet, Rfl: 1  •  budesonide-formoterol (SYMBICORT) 80-4.5 MCG/ACT inhaler, Inhale 2 puffs 2 (Two) Times a Day., Disp: 1 inhaler, Rfl: 0    No problems with medications.  Refills if needed done    Allergies   Allergen Reactions   • Bacitracin-Neomycin-Polymyxin Itching   • Dilaudid [Hydromorphone Hcl] Other (See Comments)     Oxygen stats dropped & mental altered status   • Promethazine Hcl Anxiety   • Ace Inhibitors Other (See Comments)     Family history of intolerance.   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 Sleep is ok; no orthopnea. No fever now.  SKIN: No rash/skin lesion of concern:   ENDO:  No syncope, near or diaphoretic sweaty spells.  HEENT: No recent head injury; or headache.   No vision change.  No significant hearing loss.  Ears without pain/drainage.  No sore throat.  No usual significant nasal/sinus congestion/drainage. No epistaxis.  CHEST: No chest wall tenderness or mass.  Increased cough,  without wheeze.  No SOB; no hemoptysis.  CV: No exertional chest pain, palpitations, ankle edema.  GI: No heartburn, dysphagia.  No  abdominal pain, diarrhea, constipation.  No rectal bleeding, or melena.    :  Voids without dysuria, or  incontinence to completion.  ORTHO: No painful/swollen joints but various on /off sore.  No sore neck or back.  No acute neck or back pain without recent injury.  NEURO: No dizziness, weakness of extremities.  No numbness/paresthesias.   PSYCH: No memory loss.  Mood good; not anxious, depressed but/and not suicidal.  Tries to tolerate stress .     Screening:  Gyne:   Mammogram: no TCC/epic: to review 4.2019  Bone density: 12.17.14/12.17.14 [facility] bone density..Ts hip -0.3 spine -1.2..looks good...no suggested changes..  Low dose CT chest: Tobacco-smoker: NA  GI: Colon-polyp/Shieben/7.28.17/5y  Prostate: NA  Usual lab order  6m CBC  12m CBC, CMP, LIPID, TSH, Vit D,    Lab Results:  Results for orders placed or performed in visit on 10/09/18   CBC & Differential   Result Value Ref Range    WBC 6.60 4.80 - 10.80 10*3/mm3    RBC 4.89 4.20 - 5.40 10*6/mm3    Hemoglobin 14.1 12.0 - 16.0 g/dL    Hematocrit 42.1 37.0 - 47.0 %    MCV 86.1 82.0 - 98.0 fL    MCH 28.8 28.0 - 32.0 pg    MCHC 33.5 33.0 - 36.0 g/dL    RDW 13.0 12.0 - 15.0 %    Platelets 224 130 - 400 10*3/mm3    Neutrophil Rel % 63.5 39.0 - 78.0 %    Lymphocyte Rel % 27.0 15.0 - 45.0 %    Monocyte Rel % 6.4 4.0 - 12.0 %    Eosinophil Rel % 1.7 0.0 - 4.0 %    Basophil Rel % 0.9 0.0 - 2.0 %    Neutrophils Absolute 4.20 1.87 - 8.40 10*3/mm3    Lymphocytes Absolute 1.78 0.72 - 4.86 10*3/mm3    Monocytes Absolute 0.42 0.19 - 1.30 10*3/mm3    Eosinophils Absolute 0.11 0.00 - 0.70 10*3/mm3    Basophils Absolute 0.06 0.00 - 0.20 10*3/mm3    Immature Granulocyte Rel % 0.5 0.0 - 5.0 %    Immature Grans Absolute 0.03 0.00 - 0.03 10*3/mm3    nRBC 0.0 0.0 - 0.0 /100 WBC       A1C:No results for input(s): HGBA1C in the last 58989 hours.  PSA:No results for input(s): PSA in the last 82632 hours.  CBC:  Lab Results - Last 18 Months   Lab Units 10/09/18  0704 03/22/18  0721  "  WBC 10*3/mm3 6.60 4.77*   HEMOGLOBIN g/dL 14.1 13.9   HEMATOCRIT % 42.1 43.2   PLATELETS 10*3/mm3 224 199      BMP/CMP:  Lab Results - Last 18 Months   Lab Units 03/22/18  0721   SODIUM mmol/L 142   POTASSIUM mmol/L 4.1   CHLORIDE mmol/L 101   TOTAL CO2 mmol/L 28.0   GLUCOSE mg/dL 96   BUN mg/dL 15   CREATININE mg/dL 0.64   EGFR IF NONAFRICN AM mL/min/1.73 91   EGFR IF AFRICN AM mL/min/1.73 110   CALCIUM mg/dL 10.1     HEPATIC:  Lab Results - Last 18 Months   Lab Units 03/22/18  0721   ALT (SGPT) U/L 28   AST (SGOT) U/L 22   ALK PHOS U/L 53     THYROID:  Lab Results - Last 18 Months   Lab Units 03/22/18  0721   TSH mIU/mL 2.810       Objective   /70 (BP Location: Right arm, Patient Position: Sitting, Cuff Size: Adult)   Temp 99 °F (37.2 °C) (Oral)   Resp 18   Ht 162.6 cm (64\")   Wt 70.8 kg (156 lb)   Breastfeeding? No   BMI 26.78 kg/m²   Body mass index is 26.78 kg/m².    Physical Exam  GENERAL:  Well nourished/developed in no acute distress.   SKIN: Turgor excellent, without wound, rash, lesion.  HEENT: Normal cephalic without trauma.  Pupils equal round reactive to light. Extraocular motions full without nystagmus.   External canals nonobstructive nontender without reddness. Tymphatic membranes jenny with celia structures intact.   Oral cavity without growths, exudates, and moist.  Posterior pharynx without mass, obstruction, redness.  No thyromegaly, mass, tenderness, lymphadenopathy and supple.  CV: Regular rhythm.  No murmur, gallop,  edema. Posterior pulses intact.  No carotid bruits.  CHEST: No chest wall tenderness or mass.   LUNGS: Symmetric motion with clear to auscultation.  No dullness to percussion  ABD: Soft, nontender without mass.   ORTHO: Symmetric extremities without swelling/point tenderness.  Full gross range of motion.  NEURO: CN 2-12 grossly intact.  Symmetric facies and UE/LE. 3/5 strength throughout.  Nonfocal use extremities. Speech mildly hoarse.   PSYCH: Oriented x 3.  " Pleasant calm, well kept.  Purposeful/directed conservation with intact short/long gross memory.     Assessment/Plan     1. Gastroesophageal reflux disease, esophagitis presence not specified    2. Cough    3. Sinusitis, unspecified chronicity, unspecified location      This appears to primarily be a sinobronchial cough though that does not completely negate the potential for some reflux relationship and some asthmatic bronchitis.  It is good that her reflux potential is at least controlled well enough with Protonix that for consideration of steroids she seems low risk.    Rx: reviewed/changes:  New Medications Ordered This Visit   Medications   • methylPREDNISolone (MEDROL, AKASH,) 4 MG tablet     Sig: Take as directed on package instructions.     Dispense:  1 each     Refill:  0     Pharmacist-tell patient When using steroids Do not use anti-inflammatories such as Motrin/ibuprofen, Alleve/naprosyn, Mobic and like medications   • amoxicillin-clavulanate (AUGMENTIN) 875-125 MG per tablet     Sig: Take 1 tablet by mouth 2 (Two) Times a Day.     Dispense:  20 tablet     Refill:  0   • fluticasone (FLONASE) 50 MCG/ACT nasal spray     Si sprays into the nostril(s) as directed by provider Daily.     Dispense:  1 bottle     Refill:  0   • azelastine (ASTELIN) 0.1 % nasal spray     Si sprays into the nostril(s) as directed by provider 2 (Two) Times a Day.     Dispense:  30 mL     Refill:  0       LAB/Testing/Referrals: reviewed/orders:   Today:   Orders Placed This Encounter   Procedures   • XR Chest PA & Lateral     Chronic/recurrent labs above or change to:   same    Discussions:   No better; exercise her plan to have CXR; and maybe later CT sinus  Body mass index is 26.78 kg/m².  Patient's Body mass index is 26.78 kg/m². BMI is within normal parameters. No follow-up required..    Tobacco use reviewed:  Non-smoker      There are no Patient Instructions on file for this visit.    Follow up: Return for lab;,   Gerardo-, as planned;.  Future Appointments   Date Time Provider Department Center   4/2/2019  8:20 AM LAB [facility] None   4/5/2019  9:00 AM Gael Carrillo MD [facility] None

## 2019-03-06 DIAGNOSIS — E78.5 HYPERLIPIDEMIA, UNSPECIFIED HYPERLIPIDEMIA TYPE: Chronic | ICD-10-CM

## 2019-03-06 RX ORDER — PRAVASTATIN SODIUM 40 MG
40 TABLET ORAL NIGHTLY
Qty: 90 TABLET | Refills: 3 | Status: SHIPPED | OUTPATIENT
Start: 2019-03-06 | End: 2020-04-27

## 2019-04-02 DIAGNOSIS — J32.9 SINUSITIS, UNSPECIFIED CHRONICITY, UNSPECIFIED LOCATION: ICD-10-CM

## 2019-04-02 DIAGNOSIS — R05.9 COUGH: ICD-10-CM

## 2019-04-02 DIAGNOSIS — K21.9 GASTROESOPHAGEAL REFLUX DISEASE, ESOPHAGITIS PRESENCE NOT SPECIFIED: Chronic | ICD-10-CM

## 2019-04-03 PROBLEM — R73.01 ELEVATED FASTING GLUCOSE: Status: ACTIVE | Noted: 2019-04-03

## 2019-04-03 LAB
25(OH)D3+25(OH)D2 SERPL-MCNC: 52.8 NG/ML (ref 30–100)
ALBUMIN SERPL-MCNC: 4.6 G/DL (ref 3.5–5.2)
ALBUMIN/GLOB SERPL: 2.2 G/DL
ALP SERPL-CCNC: 61 U/L (ref 39–117)
ALT SERPL-CCNC: 15 U/L (ref 1–33)
AST SERPL-CCNC: 19 U/L (ref 1–32)
BASOPHILS # BLD AUTO: 0.06 10*3/MM3 (ref 0–0.2)
BASOPHILS NFR BLD AUTO: 1.3 % (ref 0–1.5)
BILIRUB SERPL-MCNC: 0.4 MG/DL (ref 0.2–1.2)
BUN SERPL-MCNC: 13 MG/DL (ref 8–23)
BUN/CREAT SERPL: 19.4 (ref 7–25)
CALCIUM SERPL-MCNC: 9.4 MG/DL (ref 8.6–10.5)
CHLORIDE SERPL-SCNC: 99 MMOL/L (ref 98–107)
CHOLEST SERPL-MCNC: 199 MG/DL (ref 0–200)
CO2 SERPL-SCNC: 28.8 MMOL/L (ref 22–29)
CREAT SERPL-MCNC: 0.67 MG/DL (ref 0.57–1)
EOSINOPHIL # BLD AUTO: 0.2 10*3/MM3 (ref 0–0.4)
EOSINOPHIL NFR BLD AUTO: 4.4 % (ref 0.3–6.2)
ERYTHROCYTE [DISTWIDTH] IN BLOOD BY AUTOMATED COUNT: 13.2 % (ref 12.3–15.4)
GLOBULIN SER CALC-MCNC: 2.1 GM/DL
GLUCOSE SERPL-MCNC: 101 MG/DL (ref 65–99)
HCT VFR BLD AUTO: 43.3 % (ref 34–46.6)
HDLC SERPL-MCNC: 50 MG/DL (ref 40–60)
HGB BLD-MCNC: 13.5 G/DL (ref 12–15.9)
IMM GRANULOCYTES # BLD AUTO: 0.05 10*3/MM3 (ref 0–0.05)
IMM GRANULOCYTES NFR BLD AUTO: 1.1 % (ref 0–0.5)
LDLC SERPL CALC-MCNC: 118 MG/DL (ref 0–100)
LYMPHOCYTES # BLD AUTO: 1.25 10*3/MM3 (ref 0.7–3.1)
LYMPHOCYTES NFR BLD AUTO: 27.6 % (ref 19.6–45.3)
MCH RBC QN AUTO: 28.1 PG (ref 26.6–33)
MCHC RBC AUTO-ENTMCNC: 31.2 G/DL (ref 31.5–35.7)
MCV RBC AUTO: 90.2 FL (ref 79–97)
MONOCYTES # BLD AUTO: 0.41 10*3/MM3 (ref 0.1–0.9)
MONOCYTES NFR BLD AUTO: 9.1 % (ref 5–12)
NEUTROPHILS # BLD AUTO: 2.56 10*3/MM3 (ref 1.4–7)
NEUTROPHILS NFR BLD AUTO: 56.5 % (ref 42.7–76)
NRBC BLD AUTO-RTO: 0 /100 WBC (ref 0–0)
PLATELET # BLD AUTO: 210 10*3/MM3 (ref 140–450)
POTASSIUM SERPL-SCNC: 4.4 MMOL/L (ref 3.5–5.2)
PROT SERPL-MCNC: 6.7 G/DL (ref 6–8.5)
RBC # BLD AUTO: 4.8 10*6/MM3 (ref 3.77–5.28)
SODIUM SERPL-SCNC: 140 MMOL/L (ref 136–145)
TRIGL SERPL-MCNC: 155 MG/DL (ref 0–150)
TSH SERPL DL<=0.005 MIU/L-ACNC: 3.32 MIU/ML (ref 0.27–4.2)
VLDLC SERPL CALC-MCNC: 31 MG/DL (ref 5–40)
WBC # BLD AUTO: 4.53 10*3/MM3 (ref 3.4–10.8)

## 2019-04-03 RX ORDER — FLUTICASONE PROPIONATE 50 MCG
SPRAY, SUSPENSION (ML) NASAL
Qty: 16 ML | Refills: 3 | Status: SHIPPED | OUTPATIENT
Start: 2019-04-03 | End: 2020-01-29

## 2019-04-03 RX ORDER — PANTOPRAZOLE SODIUM 40 MG/1
TABLET, DELAYED RELEASE ORAL
Qty: 90 TABLET | Refills: 3 | Status: SHIPPED | OUTPATIENT
Start: 2019-04-03 | End: 2020-01-29

## 2019-04-05 ENCOUNTER — OFFICE VISIT (OUTPATIENT)
Dept: FAMILY MEDICINE CLINIC | Facility: CLINIC | Age: 74
End: 2019-04-05

## 2019-04-05 VITALS
WEIGHT: 154 LBS | BODY MASS INDEX: 26.29 KG/M2 | RESPIRATION RATE: 18 BRPM | SYSTOLIC BLOOD PRESSURE: 120 MMHG | OXYGEN SATURATION: 95 % | HEIGHT: 64 IN | HEART RATE: 72 BPM | TEMPERATURE: 99 F | DIASTOLIC BLOOD PRESSURE: 80 MMHG

## 2019-04-05 DIAGNOSIS — R05.3 CHRONIC COUGH: ICD-10-CM

## 2019-04-05 DIAGNOSIS — E55.9 VITAMIN D DEFICIENCY: ICD-10-CM

## 2019-04-05 DIAGNOSIS — R73.01 ELEVATED FASTING GLUCOSE: ICD-10-CM

## 2019-04-05 DIAGNOSIS — K21.9 GASTROESOPHAGEAL REFLUX DISEASE, ESOPHAGITIS PRESENCE NOT SPECIFIED: Primary | Chronic | ICD-10-CM

## 2019-04-05 DIAGNOSIS — F32.A DEPRESSION, UNSPECIFIED DEPRESSION TYPE: Chronic | ICD-10-CM

## 2019-04-05 DIAGNOSIS — M54.9 CHRONIC BACK PAIN, UNSPECIFIED BACK LOCATION, UNSPECIFIED BACK PAIN LATERALITY: Chronic | ICD-10-CM

## 2019-04-05 DIAGNOSIS — F41.9 ANXIETY: Chronic | ICD-10-CM

## 2019-04-05 DIAGNOSIS — G89.29 CHRONIC BACK PAIN, UNSPECIFIED BACK LOCATION, UNSPECIFIED BACK PAIN LATERALITY: Chronic | ICD-10-CM

## 2019-04-05 DIAGNOSIS — E78.2 MIXED HYPERLIPIDEMIA: Chronic | ICD-10-CM

## 2019-04-05 PROCEDURE — 99213 OFFICE O/P EST LOW 20 MIN: CPT | Performed by: FAMILY MEDICINE

## 2019-04-05 NOTE — PATIENT INSTRUCTIONS
A new shingles vaccine (a shot to lower your chance of catching shingles) is now available (shingrix).  This vaccine is the second vaccine created for this purpose; we have had zostavax for years.  Shingrix provides a much better and longer immunity for shingles than zostavax.  For this and other reasons Shingrix can be started at age 50.  If you have had zostavax in the past; you can still take Shingrix.      This vaccine is not paid for in a doctor's office by medicare, medicaid and probably most insurances.  Like zostavax; this is covered in drug stores.  This is a vaccine that if you chose to get you need to get at a drug store that gives vaccines (like [facility], [facility] and [facility].      If you get this vaccine; please let us know so we can record this on your record here.     ########################

## 2019-04-05 NOTE — PROGRESS NOTES
"Lilly Santillan is a 74 y.o. female presenting with chief complaint of:   Chief Complaint   Patient presents with   • Hyperlipidemia     Yearly exam   • Heartburn   • Depression       History of Present Illness :  Alone.   Has multiple chronic problems to consider that might have a bearing on today's issues;  an interval appointment.       Chronic/acute problems reviewed today:   1. Gastroesophageal reflux disease, esophagitis presence not specified: Chronic/stable.  Controlled heartburn, reflux; no dysphagia, melena.  Rx protonix used/: helps cough (having no heartburn at all)     2. Vitamin D deficiency: chronic/variable up/down with past labs and risk to run low especially in winter. Lab monitored. Reminded to stay on     3. Mixed hyperlipidemia: Chronic/stable.  Tolerated use of statin with labs showing improved lipid values and tolerant liver labs. No muscle aches unexpected.      4. Chronic back pain, unspecified back location, unspecified back pain laterality: : chronic/variable 0-3/10 lower back pain with infrequent/no radiation to UE/LE.  No change LE numbness.  Has bladder/bowel control. No desire to change approach of care.      5. Chronic cough: Chronic/variable.  She is never smoked.  She was exposed to secondary smoke.  The cough of the winter seems to be \"my usual cough\".  Is not bad enough that she think she needs to have any further testing or meds.   6. Elevated fasting glucose-4.3.19: Cute-chronic.  Discovered on this last lab.  No problem/pattern hypoglycemia/hyperglycemia manifest by poly- dypsia, phagia, uria, or sweats, diaphoretic episodes, syncope/near.  No family history.      7. Anxiety: Chronic/stable; more a past thing  On/off anxiety tolerated well.  Rx helps and not interested in Rx change. Stress ongoing.      8. Depression, unspecified depression type: No significant mood swings, down moods, nervousness, difficulty with concentration to function home/work.  Others close have " not been concerned.  No suicide ideation/intent.  Rx         Has an/another acute issue today: none.    The following portions of the patient's history were reviewed and updated as appropriate: allergies, current medications, past family history, past medical history, past social history, past surgical history and problem list.      Current Outpatient Medications:   •  albuterol sulfate  (90 Base) MCG/ACT inhaler, Inhale 2 puffs As Needed for Wheezing or Shortness of Air., Disp: 1 inhaler, Rfl: 3  •  cholecalciferol (VITAMIN D3) 1000 units tablet, Take 1,000 Units by mouth 2 (Two) Times a Day., Disp: , Rfl:   •  citalopram (CeleXA) 20 MG tablet, TAKE 1 TABLET BY MOUTH DAILY, Disp: 30 tablet, Rfl: 5  •  fluticasone (FLONASE) 50 MCG/ACT nasal spray, USE 2 SPRAYS IN EACH NOSTRIL EVERY DAY, Disp: 16 mL, Rfl: 3  •  pantoprazole (PROTONIX) 40 MG EC tablet, TAKE 1 TABLET BY MOUTH EVERY DAY, Disp: 90 tablet, Rfl: 3  •  pravastatin (PRAVACHOL) 40 MG tablet, TAKE 1 TABLET BY MOUTH EVERY NIGHT, Disp: 90 tablet, Rfl: 3    No problems with medications.  Refills if needed done    Allergies   Allergen Reactions   • Bacitracin-Neomycin-Polymyxin Itching   • Dilaudid [Hydromorphone Hcl] Other (See Comments)     Oxygen stats dropped & mental altered status   • Promethazine Hcl Anxiety   • Ace Inhibitors Other (See Comments)     Family history of intolerance.   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 Sleep is ok; no orthopnea. No fever now.  SKIN: No rash/skin lesion of concern:   ENDO:  No syncope, near or diaphoretic sweaty spells.  HEENT: No recent head injury; or headache.   No vision change.  No  significant hearing loss.  Ears without pain/drainage.  No sore throat.  Same usual/not that significant nasal/sinus congestion/drainage. No epistaxis.  CHEST: No chest wall tenderness or mass.  Same cough,  without wheeze.  No SOB; no hemoptysis.  CV: No exertional chest pain, palpitations, ankle edema.  GI: No heartburn, dysphagia.  No abdominal pain, diarrhea, constipation.  No rectal bleeding, or melena.    :  Voids without dysuria, or  incontinence to completion.  ORTHO: No painful/swollen joints but various on /off sore.  No sore neck or back.  No acute neck or back pain without recent injury.  NEURO: No dizziness, weakness of extremities.  No numbness/paresthesias.   PSYCH: No memory loss.  Mood good; not anxious, depressed but/and not suicidal.  Tries to tolerate stress .      Screening:  Gyne:   Mammogram: no TCC/epic: double masectomy  Bone density: 12.17.14/12.17.14 [place] bone density..Ts hip -0.3 spine -1.2..looks good...no suggested changes..be thinking  Low dose CT chest: Tobacco-smoker/age 17/dc age 25 (<8p): NA  GI: Colon-polyp/Shieben/7.28.17/5y  Prostate: NA  Usual lab order  6m CBC  12m CBC, CMP, LIPID, TSH, Vit D,    Lab Results:  Results for orders placed or performed in visit on 03/05/19   Comprehensive Metabolic Panel   Result Value Ref Range    Glucose 101 (H) 65 - 99 mg/dL    BUN 13 8 - 23 mg/dL    Creatinine 0.67 0.57 - 1.00 mg/dL    eGFR Non African Am 86 >60 mL/min/1.73    eGFR African Am 104 >60 mL/min/1.73    BUN/Creatinine Ratio 19.4 7.0 - 25.0    Sodium 140 136 - 145 mmol/L    Potassium 4.4 3.5 - 5.2 mmol/L    Chloride 99 98 - 107 mmol/L    Total CO2 28.8 22.0 - 29.0 mmol/L    Calcium 9.4 8.6 - 10.5 mg/dL    Total Protein 6.7 6.0 - 8.5 g/dL    Albumin 4.60 3.50 - 5.20 g/dL    Globulin 2.1 gm/dL    A/G Ratio 2.2 g/dL    Total Bilirubin 0.4 0.2 - 1.2 mg/dL    Alkaline Phosphatase 61 39 - 117 U/L    AST (SGOT) 19 1 - 32 U/L    ALT (SGPT) 15 1 - 33 U/L   Lipid Panel   Result Value Ref  Range    Total Cholesterol 199 0 - 200 mg/dL    Triglycerides 155 (H) 0 - 150 mg/dL    HDL Cholesterol 50 40 - 60 mg/dL    VLDL Cholesterol 31 5 - 40 mg/dL    LDL Cholesterol  118 (H) 0 - 100 mg/dL   TSH   Result Value Ref Range    TSH 3.320 0.270 - 4.200 mIU/mL   Vitamin D 25 Hydroxy   Result Value Ref Range    25 Hydroxy, Vitamin D 52.8 30.0 - 100.0 ng/ml   CBC & Differential   Result Value Ref Range    WBC 4.53 3.40 - 10.80 10*3/mm3    RBC 4.80 3.77 - 5.28 10*6/mm3    Hemoglobin 13.5 12.0 - 15.9 g/dL    Hematocrit 43.3 34.0 - 46.6 %    MCV 90.2 79.0 - 97.0 fL    MCH 28.1 26.6 - 33.0 pg    MCHC 31.2 (L) 31.5 - 35.7 g/dL    RDW 13.2 12.3 - 15.4 %    Platelets 210 140 - 450 10*3/mm3    Neutrophil Rel % 56.5 42.7 - 76.0 %    Lymphocyte Rel % 27.6 19.6 - 45.3 %    Monocyte Rel % 9.1 5.0 - 12.0 %    Eosinophil Rel % 4.4 0.3 - 6.2 %    Basophil Rel % 1.3 0.0 - 1.5 %    Neutrophils Absolute 2.56 1.40 - 7.00 10*3/mm3    Lymphocytes Absolute 1.25 0.70 - 3.10 10*3/mm3    Monocytes Absolute 0.41 0.10 - 0.90 10*3/mm3    Eosinophils Absolute 0.20 0.00 - 0.40 10*3/mm3    Basophils Absolute 0.06 0.00 - 0.20 10*3/mm3    Immature Granulocyte Rel % 1.1 (H) 0.0 - 0.5 %    Immature Grans Absolute 0.05 0.00 - 0.05 10*3/mm3    nRBC 0.0 0.0 - 0.0 /100 WBC       A1C:No results for input(s): HGBA1C in the last 82769 hours.  PSA:No results for input(s): PSA in the last 17838 hours.  CBC:  Lab Results - Last 18 Months   Lab Units 04/02/19  0707 10/09/18  0704 03/22/18  0721   WBC 10*3/mm3 4.53 6.60 4.77*   HEMOGLOBIN g/dL 13.5 14.1 13.9   HEMATOCRIT % 43.3 42.1 43.2   PLATELETS 10*3/mm3 210 224 199      BMP/CMP:  Lab Results - Last 18 Months   Lab Units 04/02/19  0707 03/22/18  0721   SODIUM mmol/L 140 142   POTASSIUM mmol/L 4.4 4.1   CHLORIDE mmol/L 99 101   TOTAL CO2 mmol/L 28.8 28.0   GLUCOSE mg/dL 101* 96   BUN mg/dL 13 15   CREATININE mg/dL 0.67 0.64   EGFR IF NONAFRICN AM mL/min/1.73 86 91   EGFR IF AFRICN AM mL/min/1.73 104 110  "  CALCIUM mg/dL 9.4 10.1     HEPATIC:  Lab Results - Last 18 Months   Lab Units 04/02/19  0707 03/22/18  0721   ALT (SGPT) U/L 15 28   AST (SGOT) U/L 19 22   ALK PHOS U/L 61 53     THYROID:  Lab Results - Last 18 Months   Lab Units 04/02/19  0707 03/22/18  0721   TSH mIU/mL 3.320 2.810       Objective   /80 (BP Location: Left arm, Patient Position: Sitting, Cuff Size: Adult)   Pulse 72   Temp 99 °F (37.2 °C) (Oral)   Resp 18   Ht 162.6 cm (64\")   Wt 69.9 kg (154 lb)   SpO2 95%   BMI 26.43 kg/m²   Body mass index is 26.43 kg/m².    Physical Exam  GENERAL:  Well nourished/developed in no acute distress.   SKIN: Turgor excellent, without wound, rash, lesion.  HEENT: Normal cephalic without trauma.  Pupils equal round reactive to light. Extraocular motions full without nystagmus.   External canals nonobstructive nontender without reddness. Tymphatic membranes jenny with celia structures intact.   Oral cavity without growths, exudates, and moist.  Posterior pharynx without mass, obstruction, redness.  No thyromegaly, mass, tenderness, lymphadenopathy and supple.  CV: Regular rhythm.  No murmur, gallop,  edema. Posterior pulses intact.  No carotid bruits.  CHEST: No chest wall tenderness or mass.   LUNGS: Symmetric motion with clear to auscultation.  No dullness to percussion  ABD: Soft, nontender without mass.   ORTHO: Symmetric extremities without swelling/point tenderness.  Full gross range of motion.  NEURO: CN 2-12 grossly intact.  Symmetric facies and UE/LE. 3/5 strength throughout.  Nonfocal use extremities. Speech mildly hoarse.   PSYCH: Oriented x 3.  Pleasant calm, well kept.  Purposeful/directed conservation with intact short/long gross memory.     Assessment/Plan     1. Gastroesophageal reflux disease, esophagitis presence not specified    2. Vitamin D deficiency    3. Mixed hyperlipidemia    4. Chronic back pain, unspecified back location, unspecified back pain laterality    5. Chronic cough  "   6. Elevated fasting glucose-4.3.19    7. Anxiety    8. Depression, unspecified depression type      She is doing very well.  Chronic issues seem to be controlled.  The only residual issue is to follow this cough out.    Rx: reviewed/changes:  No orders of the defined types were placed in this encounter.      LAB/Testing/Referrals: reviewed/orders:   Today:   No orders of the defined types were placed in this encounter.    Chronic/recurrent labs above or change to:   6/12     Discussions:   BS; to watch  Cough: to watch  Body mass index is 26.43 kg/m².  Patient's Body mass index is 26.43 kg/m². BMI is within normal parameters. No follow-up required..    Tobacco use reviewed:  Non-smoker      Patient Instructions   A new shingles vaccine (a shot to lower your chance of catching shingles) is now available (shingrix).  This vaccine is the second vaccine created for this purpose; we have had zostavax for years.  Shingrix provides a much better and longer immunity for shingles than zostavax.  For this and other reasons Shingrix can be started at age 50.  If you have had zostavax in the past; you can still take Shingrix.      This vaccine is not paid for in a doctor's office by medicare, medicaid and probably most insurances.  Like zostavax; this is covered in drug stores.  This is a vaccine that if you chose to get you need to get at a drug store that gives vaccines (like [facility], [facility] and [facility].      If you get this vaccine; please let us know so we can record this on your record here.     ########################        Follow up: Return for lab 6m;  lab/Dr Carrillo 12m.  Future Appointments   Date Time Provider Department Center   10/10/2019  8:10 AM LAB [facility] [place] [facility] None   4/6/2020  9:00 AM Gael Carrillo MD [facility] None

## 2019-08-04 DIAGNOSIS — F32.A DEPRESSION, UNSPECIFIED DEPRESSION TYPE: Chronic | ICD-10-CM

## 2019-08-04 DIAGNOSIS — F41.9 ANXIETY: Chronic | ICD-10-CM

## 2019-08-05 RX ORDER — CITALOPRAM 20 MG/1
TABLET ORAL
Qty: 30 TABLET | Refills: 0 | Status: SHIPPED | OUTPATIENT
Start: 2019-08-05 | End: 2019-09-14 | Stop reason: SDUPTHER

## 2019-09-14 DIAGNOSIS — F41.9 ANXIETY: Chronic | ICD-10-CM

## 2019-09-14 DIAGNOSIS — F32.A DEPRESSION, UNSPECIFIED DEPRESSION TYPE: Chronic | ICD-10-CM

## 2019-09-16 RX ORDER — CITALOPRAM 20 MG/1
TABLET ORAL
Qty: 90 TABLET | Refills: 0 | Status: SHIPPED | OUTPATIENT
Start: 2019-09-16 | End: 2019-12-15 | Stop reason: SDUPTHER

## 2019-10-07 DIAGNOSIS — R73.01 ELEVATED FASTING GLUCOSE: ICD-10-CM

## 2019-10-07 DIAGNOSIS — Z78.0 MENOPAUSE: Primary | Chronic | ICD-10-CM

## 2019-10-10 ENCOUNTER — RESULTS ENCOUNTER (OUTPATIENT)
Dept: FAMILY MEDICINE CLINIC | Facility: CLINIC | Age: 74
End: 2019-10-10

## 2019-10-10 DIAGNOSIS — R73.01 ELEVATED FASTING GLUCOSE: ICD-10-CM

## 2019-11-01 LAB
BASOPHILS # BLD AUTO: 0.05 10*3/MM3 (ref 0–0.2)
BASOPHILS NFR BLD AUTO: 0.9 % (ref 0–1.5)
EOSINOPHIL # BLD AUTO: 0.11 10*3/MM3 (ref 0–0.4)
EOSINOPHIL NFR BLD AUTO: 2 % (ref 0.3–6.2)
ERYTHROCYTE [DISTWIDTH] IN BLOOD BY AUTOMATED COUNT: 13.2 % (ref 12.3–15.4)
HCT VFR BLD AUTO: 41.8 % (ref 34–46.6)
HGB BLD-MCNC: 14 G/DL (ref 12–15.9)
IMM GRANULOCYTES # BLD AUTO: 0.02 10*3/MM3 (ref 0–0.05)
IMM GRANULOCYTES NFR BLD AUTO: 0.4 % (ref 0–0.5)
LYMPHOCYTES # BLD AUTO: 1.49 10*3/MM3 (ref 0.7–3.1)
LYMPHOCYTES NFR BLD AUTO: 27.3 % (ref 19.6–45.3)
MCH RBC QN AUTO: 28.7 PG (ref 26.6–33)
MCHC RBC AUTO-ENTMCNC: 33.5 G/DL (ref 31.5–35.7)
MCV RBC AUTO: 85.7 FL (ref 79–97)
MONOCYTES # BLD AUTO: 0.4 10*3/MM3 (ref 0.1–0.9)
MONOCYTES NFR BLD AUTO: 7.3 % (ref 5–12)
NEUTROPHILS # BLD AUTO: 3.38 10*3/MM3 (ref 1.7–7)
NEUTROPHILS NFR BLD AUTO: 62.1 % (ref 42.7–76)
NRBC BLD AUTO-RTO: 0 /100 WBC (ref 0–0.2)
PLATELET # BLD AUTO: 237 10*3/MM3 (ref 140–450)
RBC # BLD AUTO: 4.88 10*6/MM3 (ref 3.77–5.28)
WBC # BLD AUTO: 5.45 10*3/MM3 (ref 3.4–10.8)

## 2019-11-05 LAB
HBA1C MFR BLD: 5.7 % (ref 4.8–5.6)
WRITTEN AUTHORIZATION: NORMAL

## 2019-12-15 DIAGNOSIS — F32.A DEPRESSION, UNSPECIFIED DEPRESSION TYPE: Chronic | ICD-10-CM

## 2019-12-15 DIAGNOSIS — F41.9 ANXIETY: Chronic | ICD-10-CM

## 2019-12-16 RX ORDER — CITALOPRAM 20 MG/1
TABLET ORAL
Qty: 90 TABLET | Refills: 1 | Status: SHIPPED | OUTPATIENT
Start: 2019-12-16 | End: 2020-06-17

## 2020-01-29 ENCOUNTER — OFFICE VISIT (OUTPATIENT)
Dept: FAMILY MEDICINE CLINIC | Facility: CLINIC | Age: 75
End: 2020-01-29

## 2020-01-29 VITALS
SYSTOLIC BLOOD PRESSURE: 138 MMHG | TEMPERATURE: 98.7 F | WEIGHT: 158 LBS | HEIGHT: 64 IN | DIASTOLIC BLOOD PRESSURE: 80 MMHG | OXYGEN SATURATION: 92 % | RESPIRATION RATE: 16 BRPM | BODY MASS INDEX: 26.98 KG/M2 | HEART RATE: 79 BPM

## 2020-01-29 DIAGNOSIS — R05.3 CHRONIC COUGH: ICD-10-CM

## 2020-01-29 DIAGNOSIS — E78.2 MIXED HYPERLIPIDEMIA: Chronic | ICD-10-CM

## 2020-01-29 DIAGNOSIS — R05.9 COUGH: ICD-10-CM

## 2020-01-29 DIAGNOSIS — K21.9 GASTROESOPHAGEAL REFLUX DISEASE, ESOPHAGITIS PRESENCE NOT SPECIFIED: Chronic | ICD-10-CM

## 2020-01-29 DIAGNOSIS — J40 BRONCHITIS: ICD-10-CM

## 2020-01-29 PROCEDURE — 99214 OFFICE O/P EST MOD 30 MIN: CPT | Performed by: FAMILY MEDICINE

## 2020-01-29 RX ORDER — METHYLPREDNISOLONE 4 MG/1
TABLET ORAL
Qty: 1 EACH | Refills: 0 | Status: SHIPPED | OUTPATIENT
Start: 2020-01-29 | End: 2020-04-06

## 2020-01-29 RX ORDER — OMEPRAZOLE 20 MG/1
20 CAPSULE, DELAYED RELEASE ORAL DAILY
COMMUNITY

## 2020-01-29 RX ORDER — AMOXICILLIN AND CLAVULANATE POTASSIUM 875; 125 MG/1; MG/1
1 TABLET, FILM COATED ORAL 2 TIMES DAILY
Qty: 20 TABLET | Refills: 0 | Status: SHIPPED | OUTPATIENT
Start: 2020-01-29 | End: 2020-04-06

## 2020-01-29 NOTE — PROGRESS NOTES
Subjective   Nikki Santillan is a 74 y.o. female presenting with chief complaint of:   Chief Complaint   Patient presents with   • URI       History of Present Illness :  Alone.  Here for primarily an acute issue today;.       Week ago she stayed in a hotel neurology [place].  It was a [place].  On drive home she started getting several symptoms:  Sore throat  Cough, alittle wheeze  No hemoptysis  No fever   Watching this since then she has not gotten any better.  She reflects that when she gets things she usually does not get over without many occasions such as antibiotics and steroids.    Has few chronic problems to consider that might have a bearing on today's issues; not an interval appointment.       Chronic/acute problems reviewed today:   1. Mixed hyperlipidemia: Chronic/stable.  Tolerated use of Rx with labs showing improved lipid values and tolerant liver labs. No muscle aches unexpected.  Needs refill.      2. Gastroesophageal reflux disease, esophagitis presence not specified: Chronic/stable.  Controlled heartburn, reflux without dysphagia, melena.  Rx used, needed-doing ok.      3. Chronic cough chronic/stable mild degree of some cough.:  It is usually infrequent mild and really does not be associated with any shortness of breath.   4. Bronchitis; acute/above   5. Cough; acute/above     Has an/another acute issue today: none.    The following portions of the patient's history were reviewed and updated as appropriate: allergies, current medications, past family history, past medical history, past social history, past surgical history and problem list.      Current Outpatient Medications:   •  albuterol sulfate  (90 Base) MCG/ACT inhaler, Inhale 2 puffs As Needed for Wheezing or Shortness of Air., Disp: 1 inhaler, Rfl: 3  •  cholecalciferol (VITAMIN D3) 1000 units tablet, Take 1,000 Units by mouth 2 (Two) Times a Day., Disp: , Rfl:   •  citalopram (CeleXA) 20 MG tablet, TAKE 1 TABLET BY MOUTH DAILY,  Disp: 90 tablet, Rfl: 1  •  omeprazole (priLOSEC) 20 MG capsule, Take 20 mg by mouth Daily., Disp: , Rfl:   •  pravastatin (PRAVACHOL) 40 MG tablet, TAKE 1 TABLET BY MOUTH EVERY NIGHT, Disp: 90 tablet, Rfl: 3  •  amoxicillin-clavulanate (AUGMENTIN) 875-125 MG per tablet, Take 1 tablet by mouth 2 (Two) Times a Day., Disp: 20 tablet, Rfl: 0  •  methylPREDNISolone (MEDROL, AKASH,) 4 MG tablet, Take as directed on package instructions., Disp: 1 each, Rfl: 0    No problems with medications.  Refills if needed done    Allergies   Allergen Reactions   • Bacitracin-Neomycin-Polymyxin Itching   • Dilaudid [Hydromorphone Hcl] Other (See Comments)     Oxygen stats dropped & mental altered status   • Promethazine Hcl Anxiety   • Ace Inhibitors Other (See Comments)     Family history of intolerance.   • Blephamide [Sulfacetamide-Prednisolone] Rash   • Neosporin [Neomycin-Bacitracin Zn-Polymyx] Rash   • Other Rash     Neodecadron eye drop     • Phenergan [Promethazine] Anxiety   • Sulfa Antibiotics Rash       Review of Systems   Constitutional: Positive for activity change and fatigue. Negative for appetite change, chills, diaphoresis and fever.   HENT: Positive for congestion, ear pain, rhinorrhea, sore throat and voice change. Negative for ear discharge and trouble swallowing.    Eyes: Negative.    Respiratory: Positive for cough. Negative for choking, shortness of breath and wheezing.    Cardiovascular: Negative.    Gastrointestinal: Negative.    Genitourinary: Negative for dysuria.   Musculoskeletal: Negative for arthralgias.       Lab Results:  Results for orders placed or performed in visit on 10/10/19   Hemoglobin A1c   Result Value Ref Range    Hemoglobin A1C 5.70 (H) 4.80 - 5.60 %   Written Authorization   Result Value Ref Range    Written Authorization Comment    CBC & Differential   Result Value Ref Range    WBC 5.45 3.40 - 10.80 10*3/mm3    RBC 4.88 3.77 - 5.28 10*6/mm3    Hemoglobin 14.0 12.0 - 15.9 g/dL    Hematocrit  "41.8 34.0 - 46.6 %    MCV 85.7 79.0 - 97.0 fL    MCH 28.7 26.6 - 33.0 pg    MCHC 33.5 31.5 - 35.7 g/dL    RDW 13.2 12.3 - 15.4 %    Platelets 237 140 - 450 10*3/mm3    Neutrophil Rel % 62.1 42.7 - 76.0 %    Lymphocyte Rel % 27.3 19.6 - 45.3 %    Monocyte Rel % 7.3 5.0 - 12.0 %    Eosinophil Rel % 2.0 0.3 - 6.2 %    Basophil Rel % 0.9 0.0 - 1.5 %    Neutrophils Absolute 3.38 1.70 - 7.00 10*3/mm3    Lymphocytes Absolute 1.49 0.70 - 3.10 10*3/mm3    Monocytes Absolute 0.40 0.10 - 0.90 10*3/mm3    Eosinophils Absolute 0.11 0.00 - 0.40 10*3/mm3    Basophils Absolute 0.05 0.00 - 0.20 10*3/mm3    Immature Granulocyte Rel % 0.4 0.0 - 0.5 %    Immature Grans Absolute 0.02 0.00 - 0.05 10*3/mm3    nRBC 0.0 0.0 - 0.2 /100 WBC       A1C:  Lab Results - Last 18 Months   Lab Units 10/31/19  0720   HEMOGLOBIN A1C % 5.70*     PSA:No results for input(s): PSA in the last 40522 hours.  CBC:  Lab Results - Last 18 Months   Lab Units 10/31/19  0720 04/02/19  0707 10/09/18  0704   WBC 10*3/mm3 5.45 4.53 6.60   HEMOGLOBIN g/dL 14.0 13.5 14.1   HEMATOCRIT % 41.8 43.3 42.1   PLATELETS 10*3/mm3 237 210 224      BMP/CMP:  Lab Results - Last 18 Months   Lab Units 04/02/19  0707   SODIUM mmol/L 140   POTASSIUM mmol/L 4.4   CHLORIDE mmol/L 99   TOTAL CO2 mmol/L 28.8   GLUCOSE mg/dL 101*   BUN mg/dL 13   CREATININE mg/dL 0.67   EGFR IF NONAFRICN AM mL/min/1.73 86   EGFR IF AFRICN AM mL/min/1.73 104   CALCIUM mg/dL 9.4     HEPATIC:  Lab Results - Last 18 Months   Lab Units 04/02/19  0707   ALT (SGPT) U/L 15   AST (SGOT) U/L 19   ALK PHOS U/L 61     THYROID:  Lab Results - Last 18 Months   Lab Units 04/02/19  0707   TSH mIU/mL 3.320       Objective   /80   Pulse 79   Temp 98.7 °F (37.1 °C)   Resp 16   Ht 162.6 cm (64\")   Wt 71.7 kg (158 lb)   SpO2 92%   Breastfeeding No   BMI 27.12 kg/m²   Body mass index is 27.12 kg/m².    Physical Exam   Constitutional: She is oriented to person, place, and time. She appears well-developed and " well-nourished. No distress.   HENT:   Head: Normocephalic and atraumatic.   Right Ear: External ear normal.   Left Ear: External ear normal.   Nose: Nose normal.   Mouth/Throat: Oropharynx is clear and moist. No oropharyngeal exudate.   Eyes: Pupils are equal, round, and reactive to light. EOM are normal.   Neck: Normal range of motion. Neck supple. No thyromegaly present.   Cardiovascular: Normal rate, regular rhythm, normal heart sounds and intact distal pulses. Exam reveals no friction rub.   No murmur heard.  Pulmonary/Chest: Effort normal. No stridor. She has no wheezes. She has no rales. She exhibits no tenderness.   hoarse   Abdominal: Soft. There is no tenderness.   Lymphadenopathy:     She has no cervical adenopathy.   Neurological: She is alert and oriented to person, place, and time.   Skin: Skin is warm and dry. Capillary refill takes less than 2 seconds.   Psychiatric: She has a normal mood and affect. Her behavior is normal. Judgment and thought content normal.   Nursing note and vitals reviewed.      Assessment/Plan     1. Mixed hyperlipidemia    2. Gastroesophageal reflux disease, esophagitis presence not specified    3. Chronic cough    4. Bronchitis    5. Cough        Rx: reviewed/changes:  New Medications Ordered This Visit   Medications   • amoxicillin-clavulanate (AUGMENTIN) 875-125 MG per tablet     Sig: Take 1 tablet by mouth 2 (Two) Times a Day.     Dispense:  20 tablet     Refill:  0   • methylPREDNISolone (MEDROL, AKASH,) 4 MG tablet     Sig: Take as directed on package instructions.     Dispense:  1 each     Refill:  0     Pharmacist-tell patient When using steroids Do not use anti-inflammatories such as Motrin/ibuprofen, Alleve/naprosyn, Mobic and like medications       LAB/Testing/Referrals: reviewed/orders:   Today:   No orders of the defined types were placed in this encounter.    Chronic/recurrent labs above or change to:   same     Discussions:   Same Rx with acute Rx above  Body mass  index is 27.12 kg/m².  Patient's Body mass index is 27.12 kg/m². BMI is within normal parameters. No follow-up required..    Tobacco use reviewed:    Non-smoker  Nikki Santillan  reports that she quit smoking about 51 years ago. Her smoking use included cigarettes. She started smoking about 57 years ago. She smoked 0.50 packs per day. She has never used smokeless tobacco..     Patient Instructions   When using steroids  A. Do not use anti-inflammatories such as Motrin/ibuprofen, Alleve/naprosyn, Mobic and like medications  B. Stay on your stomach medicines for ulcer/like (like prilosec, protonix, nexium)  C. If you are diabetic or pre-diabetic; it will raise your blood sugar.  For most people this will be a minimal and very tolerated elevation.  If you check your blood sugars (or have the ability to check) you should consider for the first days of the steroid Rx to check your blood sugar more often (1-3/days).  Call if you see blood sugars over 350.  Being more strict on your diabetic diet while using the steroid will help.     If using insulin: add sliding scale to your program:     Based on BS give this much insulin EXTRA to any other insulin/diabetic medication being used.     150-199 2 units  200-249 3 units  250-299 4 units  300-349 5 units  350-400 6 units  Greater 400 7 units    ########################        Follow up: Return for lab;, Dr Carrillo-, as planned;.  Future Appointments   Date Time Provider Department Center   4/2/2020  8:15 AM [facility] None   4/6/2020  9:00 AM Gael Carrillo MD [facility] None

## 2020-01-29 NOTE — PATIENT INSTRUCTIONS
When using steroids  A. Do not use anti-inflammatories such as Motrin/ibuprofen, Alleve/naprosyn, Mobic and like medications  B. Stay on your stomach medicines for ulcer/like (like prilosec, protonix, nexium)  C. If you are diabetic or pre-diabetic; it will raise your blood sugar.  For most people this will be a minimal and very tolerated elevation.  If you check your blood sugars (or have the ability to check) you should consider for the first days of the steroid Rx to check your blood sugar more often (1-3/days).  Call if you see blood sugars over 350.  Being more strict on your diabetic diet while using the steroid will help.     If using insulin: add sliding scale to your program:     Based on BS give this much insulin EXTRA to any other insulin/diabetic medication being used.     150-199 2 units  200-249 3 units  250-299 4 units  300-349 5 units  350-400 6 units  Greater 400 7 units    ########################

## 2020-03-27 DIAGNOSIS — R73.01 ELEVATED FASTING GLUCOSE: ICD-10-CM

## 2020-03-27 DIAGNOSIS — Z00.00 WELLNESS EXAMINATION: ICD-10-CM

## 2020-03-27 DIAGNOSIS — F41.9 ANXIETY: ICD-10-CM

## 2020-03-27 DIAGNOSIS — M85.80 OSTEOPENIA, UNSPECIFIED LOCATION: ICD-10-CM

## 2020-03-27 DIAGNOSIS — E55.9 VITAMIN D DEFICIENCY: ICD-10-CM

## 2020-03-27 DIAGNOSIS — E78.2 MIXED HYPERLIPIDEMIA: Primary | ICD-10-CM

## 2020-04-02 ENCOUNTER — RESULTS ENCOUNTER (OUTPATIENT)
Dept: FAMILY MEDICINE CLINIC | Facility: CLINIC | Age: 75
End: 2020-04-02

## 2020-04-02 DIAGNOSIS — Z00.00 WELLNESS EXAMINATION: ICD-10-CM

## 2020-04-02 DIAGNOSIS — E55.9 VITAMIN D DEFICIENCY: ICD-10-CM

## 2020-04-02 DIAGNOSIS — R73.01 ELEVATED FASTING GLUCOSE: ICD-10-CM

## 2020-04-02 DIAGNOSIS — F41.9 ANXIETY: ICD-10-CM

## 2020-04-02 DIAGNOSIS — E78.2 MIXED HYPERLIPIDEMIA: ICD-10-CM

## 2020-04-02 DIAGNOSIS — M85.80 OSTEOPENIA, UNSPECIFIED LOCATION: ICD-10-CM

## 2020-04-02 LAB
25(OH)D3+25(OH)D2 SERPL-MCNC: 53.3 NG/ML (ref 30–100)
ALBUMIN SERPL-MCNC: 5 G/DL (ref 3.5–5.2)
ALBUMIN/GLOB SERPL: 2.9 G/DL
ALP SERPL-CCNC: 58 U/L (ref 39–117)
ALT SERPL-CCNC: 17 U/L (ref 1–33)
AST SERPL-CCNC: 22 U/L (ref 1–32)
BASOPHILS # BLD AUTO: 0.07 10*3/MM3 (ref 0–0.2)
BASOPHILS NFR BLD AUTO: 1.4 % (ref 0–1.5)
BILIRUB SERPL-MCNC: 0.5 MG/DL (ref 0.2–1.2)
BUN SERPL-MCNC: 10 MG/DL (ref 8–23)
BUN/CREAT SERPL: 14.9 (ref 7–25)
CALCIUM SERPL-MCNC: 9.5 MG/DL (ref 8.6–10.5)
CHLORIDE SERPL-SCNC: 100 MMOL/L (ref 98–107)
CHOLEST SERPL-MCNC: 184 MG/DL (ref 0–200)
CO2 SERPL-SCNC: 26.9 MMOL/L (ref 22–29)
CREAT SERPL-MCNC: 0.67 MG/DL (ref 0.57–1)
EOSINOPHIL # BLD AUTO: 0.1 10*3/MM3 (ref 0–0.4)
EOSINOPHIL NFR BLD AUTO: 2 % (ref 0.3–6.2)
ERYTHROCYTE [DISTWIDTH] IN BLOOD BY AUTOMATED COUNT: 13.5 % (ref 12.3–15.4)
GLOBULIN SER CALC-MCNC: 1.7 GM/DL
GLUCOSE SERPL-MCNC: 99 MG/DL (ref 65–99)
HBA1C MFR BLD: 5.84 % (ref 4.8–5.6)
HCT VFR BLD AUTO: 43.3 % (ref 34–46.6)
HCV AB S/CO SERPL IA: <0.1 S/CO RATIO (ref 0–0.9)
HDLC SERPL-MCNC: 46 MG/DL (ref 40–60)
HGB BLD-MCNC: 14.4 G/DL (ref 12–15.9)
IMM GRANULOCYTES # BLD AUTO: 0.02 10*3/MM3 (ref 0–0.05)
IMM GRANULOCYTES NFR BLD AUTO: 0.4 % (ref 0–0.5)
LDLC SERPL CALC-MCNC: 103 MG/DL (ref 0–100)
LYMPHOCYTES # BLD AUTO: 1.62 10*3/MM3 (ref 0.7–3.1)
LYMPHOCYTES NFR BLD AUTO: 31.6 % (ref 19.6–45.3)
MCH RBC QN AUTO: 28.4 PG (ref 26.6–33)
MCHC RBC AUTO-ENTMCNC: 33.3 G/DL (ref 31.5–35.7)
MCV RBC AUTO: 85.4 FL (ref 79–97)
MONOCYTES # BLD AUTO: 0.35 10*3/MM3 (ref 0.1–0.9)
MONOCYTES NFR BLD AUTO: 6.8 % (ref 5–12)
NEUTROPHILS # BLD AUTO: 2.96 10*3/MM3 (ref 1.7–7)
NEUTROPHILS NFR BLD AUTO: 57.8 % (ref 42.7–76)
NRBC BLD AUTO-RTO: 0 /100 WBC (ref 0–0.2)
PLATELET # BLD AUTO: 221 10*3/MM3 (ref 140–450)
POTASSIUM SERPL-SCNC: 4.2 MMOL/L (ref 3.5–5.2)
PROT SERPL-MCNC: 6.7 G/DL (ref 6–8.5)
RBC # BLD AUTO: 5.07 10*6/MM3 (ref 3.77–5.28)
SODIUM SERPL-SCNC: 139 MMOL/L (ref 136–145)
TRIGL SERPL-MCNC: 173 MG/DL (ref 0–150)
TSH SERPL DL<=0.005 MIU/L-ACNC: 3.18 UIU/ML (ref 0.27–4.2)
VLDLC SERPL CALC-MCNC: 34.6 MG/DL (ref 5–40)
WBC # BLD AUTO: 5.12 10*3/MM3 (ref 3.4–10.8)

## 2020-04-06 ENCOUNTER — OFFICE VISIT (OUTPATIENT)
Dept: FAMILY MEDICINE CLINIC | Facility: CLINIC | Age: 75
End: 2020-04-06

## 2020-04-06 VITALS — BODY MASS INDEX: 25.52 KG/M2 | WEIGHT: 149.5 LBS | HEIGHT: 64 IN

## 2020-04-06 DIAGNOSIS — M85.80 OSTEOPENIA, UNSPECIFIED LOCATION: Chronic | ICD-10-CM

## 2020-04-06 DIAGNOSIS — F41.9 ANXIETY: Chronic | ICD-10-CM

## 2020-04-06 DIAGNOSIS — M54.9 CHRONIC BACK PAIN, UNSPECIFIED BACK LOCATION, UNSPECIFIED BACK PAIN LATERALITY: Chronic | ICD-10-CM

## 2020-04-06 DIAGNOSIS — E55.9 VITAMIN D DEFICIENCY: ICD-10-CM

## 2020-04-06 DIAGNOSIS — K21.9 GASTROESOPHAGEAL REFLUX DISEASE, ESOPHAGITIS PRESENCE NOT SPECIFIED: Chronic | ICD-10-CM

## 2020-04-06 DIAGNOSIS — R73.01 ELEVATED FASTING GLUCOSE: ICD-10-CM

## 2020-04-06 DIAGNOSIS — F32.A DEPRESSION, UNSPECIFIED DEPRESSION TYPE: Chronic | ICD-10-CM

## 2020-04-06 DIAGNOSIS — G89.29 CHRONIC BACK PAIN, UNSPECIFIED BACK LOCATION, UNSPECIFIED BACK PAIN LATERALITY: Chronic | ICD-10-CM

## 2020-04-06 DIAGNOSIS — R05.3 CHRONIC COUGH: ICD-10-CM

## 2020-04-06 DIAGNOSIS — E78.2 MIXED HYPERLIPIDEMIA: Primary | Chronic | ICD-10-CM

## 2020-04-06 DIAGNOSIS — Z78.0 MENOPAUSE: ICD-10-CM

## 2020-04-06 PROCEDURE — 99213 OFFICE O/P EST LOW 20 MIN: CPT | Performed by: FAMILY MEDICINE

## 2020-04-06 NOTE — PROGRESS NOTES
"Late entry 0825  You have chosen to receive care through a telephone visit today. Do you consent to use a telephone visit for your medical care today? \"yes\"  "

## 2020-04-06 NOTE — PROGRESS NOTES
Subjective   Nikki Santillan is a 74 y.o. female presenting with chief complaint of:   No chief complaint on file.      History of Present Illness :  Alone; telephone visit due to COVID 19 emergency.       Has multiple chronic problems to consider that might have a bearing on today's issues;  an interval appointment.       Chronic/acute problems reviewed today:   1. Mixed hyperlipidemia: Chronic/stable.  Tolerated use of Rx with labs showing improved lipid values and tolerant liver labs. No muscle aches unexpected.      2. Chronic cough: chronic/variable and currently doing very well; no cough.  Working dx some reflux.   3. Vitamin D deficiency: chronic/variable up/down with past labs and risk to run low especially in winter. Lab monitored.      4. Gastroesophageal reflux disease, esophagitis presence not specified: Chronic/stable.  Controlled heartburn, reflux without dysphagia, melena.  Rx used needed-doing ok.      5. Osteopenia, unspecified location: Chronic/stable.  Last bone density/if below.   Has limited Rx.  Ok further bone density screening when due.      6. Elevated fasting glucose-4.3.19: Chronic/stable   No problem/pattern hypoglycemia/hyperglycemia manifest by poly- dypsia, phagia, uria, or sweats, diaphoretic episodes, syncope/near.       7. Depression, unspecified depression type: chronic/stable without mood swings, down moods, nervousness, difficulty with concentration to function home/work.  Others close have not been concerned.  No suicide ideation/intent.  Rx not needed.       8. Anxiety: Chronic/stable:   On/off anxiety tolerated well.  Rx not needed/not interested in Rx change. Stress ongoing very low/tolerated.      9. Chronic back pain, unspecified back location, unspecified back pain laterality: : chronic/variable 0/10 lower back pain with infrequent/no radiation to UE/LE.  No change LE numbness.  Has bladder/bowel control. No desire to change approach of care.        Has an/another acute issue  today: none.    The following portions of the patient's history were reviewed and updated as appropriate: allergies, current medications, past family history, past medical history, past social history, past surgical history and problem list.    Current Outpatient Medications:   •  albuterol sulfate  (90 Base) MCG/ACT inhaler, Inhale 2 puffs As Needed for Wheezing or Shortness of Air., Disp: 1 inhaler, Rfl: 3  •  amoxicillin-clavulanate (AUGMENTIN) 875-125 MG per tablet, Take 1 tablet by mouth 2 (Two) Times a Day., Disp: 20 tablet, Rfl: 0  •  cholecalciferol (VITAMIN D3) 1000 units tablet, Take 1,000 Units by mouth 2 (Two) Times a Day., Disp: , Rfl:   •  citalopram (CeleXA) 20 MG tablet, TAKE 1 TABLET BY MOUTH DAILY, Disp: 90 tablet, Rfl: 1  •  methylPREDNISolone (MEDROL, AKASH,) 4 MG tablet, Take as directed on package instructions., Disp: 1 each, Rfl: 0  •  omeprazole (priLOSEC) 20 MG capsule, Take 20 mg by mouth Daily., Disp: , Rfl:   •  pravastatin (PRAVACHOL) 40 MG tablet, TAKE 1 TABLET BY MOUTH EVERY NIGHT, Disp: 90 tablet, Rfl: 3    No problems with medications.  Refills if needed done    Allergies   Allergen Reactions   • Bacitracin-Neomycin-Polymyxin Itching   • Dilaudid [Hydromorphone Hcl] Other (See Comments)     Oxygen stats dropped & mental altered status   • Promethazine Hcl Anxiety   • Ace Inhibitors Other (See Comments)     Family history of intolerance.   • Blephamide [Sulfacetamide-Prednisolone] Rash   • Neosporin [Neomycin-Bacitracin Zn-Polymyx] Rash   • Other Rash     Neodecadron eye drop     • Phenergan [Promethazine] Anxiety   • Sulfa Antibiotics Rash       Review of Systems  GENERAL:  Active/slower with limits, speed, stamina for age.  Sleep is ok; no orthopnea. No fever now.  SKIN: No rash/skin lesion of concern:   ENDO:  No syncope, near or diaphoretic sweaty spells.  HEENT: No recent head injury; or headache.   No vision change.  No significant hearing loss.  Ears without pain/drainage.   No sore throat.  Same usual/not that significant nasal/sinus congestion/drainage. No epistaxis.  CHEST: No chest wall tenderness or mass.  Same infrequent cough,  without wheeze.  No SOB; no hemoptysis.  CV: No exertional chest pain, palpitations, ankle edema.  GI: No heartburn, dysphagia.  No abdominal pain, diarrhea, constipation.  No rectal bleeding, or melena.    :  Voids without dysuria, or incontinence to completion.  ORTHO: No painful/swollen joints but various on /off sore.  No sore neck or back.  No acute neck or back pain without recent injury.  NEURO: No dizziness, weakness of extremities.  No numbness/paresthesias.   PSYCH: No memory loss.  Mood good; not anxious, depressed but/and not suicidal.  Tries to tolerate stress .      Screening:  Gyne:   Mammogram: no TCC/epic: double masectomy  Bone density: 12.17.14/12.17.14 [place] bone density..Ts hip -0.3 spine -1.2..looks good...no suggested changes..-agrees  Low dose CT chest: Tobacco-smoker/age 17/dc age 25 (<8p): NA  GI: Colon-polyp/Shieben/7.28.17/5y  Prostate: NA  Usual lab order  6m CBC  12m CBC, CMP, LIPID, TSH, Vit D,       Lab Results:  Results for orders placed or performed in visit on 04/02/20   Comprehensive metabolic panel   Result Value Ref Range    Glucose 99 65 - 99 mg/dL    BUN 10 8 - 23 mg/dL    Creatinine 0.67 0.57 - 1.00 mg/dL    eGFR Non African Am 86 >60 mL/min/1.73    eGFR African Am 104 >60 mL/min/1.73    BUN/Creatinine Ratio 14.9 7.0 - 25.0    Sodium 139 136 - 145 mmol/L    Potassium 4.2 3.5 - 5.2 mmol/L    Chloride 100 98 - 107 mmol/L    Total CO2 26.9 22.0 - 29.0 mmol/L    Calcium 9.5 8.6 - 10.5 mg/dL    Total Protein 6.7 6.0 - 8.5 g/dL    Albumin 5.00 3.50 - 5.20 g/dL    Globulin 1.7 gm/dL    A/G Ratio 2.9 g/dL    Total Bilirubin 0.5 0.2 - 1.2 mg/dL    Alkaline Phosphatase 58 39 - 117 U/L    AST (SGOT) 22 1 - 32 U/L    ALT (SGPT) 17 1 - 33 U/L   Hemoglobin A1c   Result Value Ref Range    Hemoglobin A1C 5.84 (H) 4.80 - 5.60 %    Lipid panel   Result Value Ref Range    Total Cholesterol 184 0 - 200 mg/dL    Triglycerides 173 (H) 0 - 150 mg/dL    HDL Cholesterol 46 40 - 60 mg/dL    VLDL Cholesterol 34.6 5 - 40 mg/dL    LDL Cholesterol  103 (H) 0 - 100 mg/dL   TSH   Result Value Ref Range    TSH 3.180 0.270 - 4.200 uIU/mL   Vitamin D 25 Hydroxy   Result Value Ref Range    25 Hydroxy, Vitamin D 53.3 30.0 - 100.0 ng/ml   Hepatitis C Antibody   Result Value Ref Range    Hep C Virus Ab <0.1 0.0 - 0.9 s/co ratio   CBC & Differential   Result Value Ref Range    WBC 5.12 3.40 - 10.80 10*3/mm3    RBC 5.07 3.77 - 5.28 10*6/mm3    Hemoglobin 14.4 12.0 - 15.9 g/dL    Hematocrit 43.3 34.0 - 46.6 %    MCV 85.4 79.0 - 97.0 fL    MCH 28.4 26.6 - 33.0 pg    MCHC 33.3 31.5 - 35.7 g/dL    RDW 13.5 12.3 - 15.4 %    Platelets 221 140 - 450 10*3/mm3    Neutrophil Rel % 57.8 42.7 - 76.0 %    Lymphocyte Rel % 31.6 19.6 - 45.3 %    Monocyte Rel % 6.8 5.0 - 12.0 %    Eosinophil Rel % 2.0 0.3 - 6.2 %    Basophil Rel % 1.4 0.0 - 1.5 %    Neutrophils Absolute 2.96 1.70 - 7.00 10*3/mm3    Lymphocytes Absolute 1.62 0.70 - 3.10 10*3/mm3    Monocytes Absolute 0.35 0.10 - 0.90 10*3/mm3    Eosinophils Absolute 0.10 0.00 - 0.40 10*3/mm3    Basophils Absolute 0.07 0.00 - 0.20 10*3/mm3    Immature Granulocyte Rel % 0.4 0.0 - 0.5 %    Immature Grans Absolute 0.02 0.00 - 0.05 10*3/mm3    nRBC 0.0 0.0 - 0.2 /100 WBC       A1C:  Lab Results - Last 18 Months   Lab Units 04/01/20  0715 10/31/19  0720   HEMOGLOBIN A1C % 5.84* 5.70*     PSA:No results for input(s): PSA in the last 19756 hours.  CBC:  Lab Results - Last 18 Months   Lab Units 04/01/20  0715 10/31/19  0720 04/02/19  0707 10/09/18  0704   WBC 10*3/mm3 5.12 5.45 4.53 6.60   HEMOGLOBIN g/dL 14.4 14.0 13.5 14.1   HEMATOCRIT % 43.3 41.8 43.3 42.1   PLATELETS 10*3/mm3 221 237 210 224      BMP/CMP:  Lab Results - Last 18 Months   Lab Units 04/01/20  0715 04/02/19  0707   SODIUM mmol/L 139 140   POTASSIUM mmol/L 4.2 4.4    CHLORIDE mmol/L 100 99   TOTAL CO2 mmol/L 26.9 28.8   GLUCOSE mg/dL 99 101*   BUN mg/dL 10 13   CREATININE mg/dL 0.67 0.67   EGFR IF NONAFRICN AM mL/min/1.73 86 86   EGFR IF AFRICN AM mL/min/1.73 104 104   CALCIUM mg/dL 9.5 9.4     HEPATIC:  Lab Results - Last 18 Months   Lab Units 04/01/20  0715 04/02/19  0707   ALT (SGPT) U/L 17 15   AST (SGOT) U/L 22 19   ALK PHOS U/L 58 61     THYROID:  Lab Results - Last 18 Months   Lab Units 04/01/20  0715 04/02/19  0707   TSH uIU/mL 3.180 3.320       Objective   There were no vitals taken for this visit.  There is no height or weight on file to calculate BMI.    Physical Exam  None due to telehealth    Assessment/Plan     1. Mixed hyperlipidemia    2. Chronic cough    3. Vitamin D deficiency    4. Gastroesophageal reflux disease, esophagitis presence not specified    5. Osteopenia, unspecified location    6. Elevated fasting glucose-4.3.19    7. Depression, unspecified depression type    8. Anxiety    9. Chronic back pain, unspecified back location, unspecified back pain laterality      Rx: reviewed/changes:  No orders of the defined types were placed in this encounter.    LAB/Testing/Referrals: reviewed/orders:   Today:   No orders of the defined types were placed in this encounter.    Chronic/recurrent labs above or change to:   same    Discussions:   Covid issues  Bone density    Tobacco use reviewed:    Non-smoker  Nikki Santillan  reports that she quit smoking about 51 years ago. Her smoking use included cigarettes. She started smoking about 58 years ago. She smoked 0.50 packs per day. She has never used smokeless tobacco..     There are no Patient Instructions on file for this visit.    Follow up: No follow-ups on file.  Future Appointments   Date Time Provider Department Center   4/6/2020  9:00 AM Gael Carrillo MD [facility] None     This visit has been rescheduled as a phone visit to comply with patient safety concerns in accordance with CDC recommendations.  Total time of discussion was 16 minutes.

## 2020-04-27 DIAGNOSIS — E78.5 HYPERLIPIDEMIA, UNSPECIFIED HYPERLIPIDEMIA TYPE: Chronic | ICD-10-CM

## 2020-04-27 RX ORDER — PRAVASTATIN SODIUM 40 MG
40 TABLET ORAL NIGHTLY
Qty: 90 TABLET | Refills: 3 | Status: SHIPPED | OUTPATIENT
Start: 2020-04-27 | End: 2021-05-06 | Stop reason: SDUPTHER

## 2020-05-12 DIAGNOSIS — M85.80 OSTEOPENIA, UNSPECIFIED LOCATION: Chronic | ICD-10-CM

## 2020-05-12 DIAGNOSIS — Z78.0 MENOPAUSE: ICD-10-CM

## 2020-06-16 DIAGNOSIS — F32.A DEPRESSION, UNSPECIFIED DEPRESSION TYPE: Chronic | ICD-10-CM

## 2020-06-16 DIAGNOSIS — F41.9 ANXIETY: Chronic | ICD-10-CM

## 2020-06-17 RX ORDER — CITALOPRAM 20 MG/1
TABLET ORAL
Qty: 90 TABLET | Refills: 1 | Status: SHIPPED | OUTPATIENT
Start: 2020-06-17 | End: 2020-12-17

## 2020-10-05 DIAGNOSIS — F41.9 ANXIETY: ICD-10-CM

## 2020-10-05 DIAGNOSIS — M85.80 OSTEOPENIA, UNSPECIFIED LOCATION: Primary | ICD-10-CM

## 2020-10-05 DIAGNOSIS — R73.01 ELEVATED FASTING GLUCOSE: ICD-10-CM

## 2020-10-06 ENCOUNTER — RESULTS ENCOUNTER (OUTPATIENT)
Dept: FAMILY MEDICINE CLINIC | Facility: CLINIC | Age: 75
End: 2020-10-06

## 2020-10-06 ENCOUNTER — LAB (OUTPATIENT)
Dept: FAMILY MEDICINE CLINIC | Facility: CLINIC | Age: 75
End: 2020-10-06

## 2020-10-06 DIAGNOSIS — F41.9 ANXIETY: ICD-10-CM

## 2020-10-06 DIAGNOSIS — M85.80 OSTEOPENIA, UNSPECIFIED LOCATION: ICD-10-CM

## 2020-10-06 DIAGNOSIS — R73.01 ELEVATED FASTING GLUCOSE: ICD-10-CM

## 2020-10-07 LAB
BASOPHILS # BLD AUTO: 0.05 10*3/MM3 (ref 0–0.2)
BASOPHILS NFR BLD AUTO: 0.9 % (ref 0–1.5)
BUN SERPL-MCNC: 9 MG/DL (ref 8–23)
BUN/CREAT SERPL: 12.5 (ref 7–25)
CALCIUM SERPL-MCNC: 9.7 MG/DL (ref 8.6–10.5)
CHLORIDE SERPL-SCNC: 99 MMOL/L (ref 98–107)
CO2 SERPL-SCNC: 27.2 MMOL/L (ref 22–29)
CREAT SERPL-MCNC: 0.72 MG/DL (ref 0.57–1)
EOSINOPHIL # BLD AUTO: 0.11 10*3/MM3 (ref 0–0.4)
EOSINOPHIL NFR BLD AUTO: 2 % (ref 0.3–6.2)
ERYTHROCYTE [DISTWIDTH] IN BLOOD BY AUTOMATED COUNT: 13.1 % (ref 12.3–15.4)
GLUCOSE SERPL-MCNC: 97 MG/DL (ref 65–99)
HBA1C MFR BLD: 5.7 % (ref 4.8–5.6)
HCT VFR BLD AUTO: 42.6 % (ref 34–46.6)
HGB BLD-MCNC: 14.6 G/DL (ref 12–15.9)
IMM GRANULOCYTES # BLD AUTO: 0.03 10*3/MM3 (ref 0–0.05)
IMM GRANULOCYTES NFR BLD AUTO: 0.5 % (ref 0–0.5)
LYMPHOCYTES # BLD AUTO: 1.66 10*3/MM3 (ref 0.7–3.1)
LYMPHOCYTES NFR BLD AUTO: 29.4 % (ref 19.6–45.3)
MCH RBC QN AUTO: 28.5 PG (ref 26.6–33)
MCHC RBC AUTO-ENTMCNC: 34.3 G/DL (ref 31.5–35.7)
MCV RBC AUTO: 83 FL (ref 79–97)
MONOCYTES # BLD AUTO: 0.38 10*3/MM3 (ref 0.1–0.9)
MONOCYTES NFR BLD AUTO: 6.7 % (ref 5–12)
NEUTROPHILS # BLD AUTO: 3.41 10*3/MM3 (ref 1.7–7)
NEUTROPHILS NFR BLD AUTO: 60.5 % (ref 42.7–76)
NRBC BLD AUTO-RTO: 0 /100 WBC (ref 0–0.2)
PLATELET # BLD AUTO: 231 10*3/MM3 (ref 140–450)
POTASSIUM SERPL-SCNC: 4 MMOL/L (ref 3.5–5.2)
RBC # BLD AUTO: 5.13 10*6/MM3 (ref 3.77–5.28)
SODIUM SERPL-SCNC: 139 MMOL/L (ref 136–145)
WBC # BLD AUTO: 5.64 10*3/MM3 (ref 3.4–10.8)

## 2020-10-12 ENCOUNTER — OFFICE VISIT (OUTPATIENT)
Dept: FAMILY MEDICINE CLINIC | Facility: CLINIC | Age: 75
End: 2020-10-12

## 2020-10-12 VITALS
RESPIRATION RATE: 18 BRPM | DIASTOLIC BLOOD PRESSURE: 80 MMHG | WEIGHT: 159 LBS | HEIGHT: 64 IN | BODY MASS INDEX: 27.14 KG/M2 | OXYGEN SATURATION: 98 % | HEART RATE: 70 BPM | TEMPERATURE: 98 F | SYSTOLIC BLOOD PRESSURE: 124 MMHG

## 2020-10-12 DIAGNOSIS — G89.29 CHRONIC BACK PAIN, UNSPECIFIED BACK LOCATION, UNSPECIFIED BACK PAIN LATERALITY: Chronic | ICD-10-CM

## 2020-10-12 DIAGNOSIS — K21.9 GASTROESOPHAGEAL REFLUX DISEASE, UNSPECIFIED WHETHER ESOPHAGITIS PRESENT: Chronic | ICD-10-CM

## 2020-10-12 DIAGNOSIS — F41.9 ANXIETY: Chronic | ICD-10-CM

## 2020-10-12 DIAGNOSIS — E78.2 MIXED HYPERLIPIDEMIA: Chronic | ICD-10-CM

## 2020-10-12 DIAGNOSIS — F32.A DEPRESSION, UNSPECIFIED DEPRESSION TYPE: Chronic | ICD-10-CM

## 2020-10-12 DIAGNOSIS — M54.9 CHRONIC BACK PAIN, UNSPECIFIED BACK LOCATION, UNSPECIFIED BACK PAIN LATERALITY: Chronic | ICD-10-CM

## 2020-10-12 DIAGNOSIS — R73.01 ELEVATED FASTING GLUCOSE: ICD-10-CM

## 2020-10-12 PROCEDURE — 99213 OFFICE O/P EST LOW 20 MIN: CPT | Performed by: FAMILY MEDICINE

## 2020-10-12 RX ORDER — LANSOPRAZOLE 15 MG/1
15 CAPSULE, DELAYED RELEASE ORAL DAILY
COMMUNITY
End: 2020-12-21

## 2020-10-12 NOTE — PROGRESS NOTES
"Lilly Santillan is a 75 y.o. female presenting with chief complaint of:   Chief Complaint   Patient presents with   • Follow-up     \"I had labs last week\"       History of Present Illness :  Alone.       Has multiple chronic problems to consider that might have a bearing on today's issues;  an interval appointment.       Chronic/acute problems reviewed today:   1. Mixed hyperlipidemia Chronic/stable.  Tolerated use of Rx with labs showing improved lipid values and tolerant liver labs. No muscle aches unexpected.      2. Gastroesophageal reflux disease, unspecified whether esophagitis present Chronic/stable.  Controlled heartburn, reflux without dysphagia, melena.  Rx used, periods not used proven needed with symptoms -currently doing ok.      3. Chronic back pain, unspecified back location, unspecified back pain laterality : chronic/variable 0-2/10 lower back pain with infrequent/no radiation to UE/LE.  No change LE numbness.  Has bladder/bowel control. No desire to change approach of care.      4. Anxiety Chronic/stable:   On/off anxiety tolerated well.  Rx helps and not interested in Rx change. Stress ongoing tolerated.      5. Depression, unspecified depression type chronic/stable without significant  mood swings, down moods, nervousness, difficulty with concentration to function home/work.  Others close have not been concerned.  No suicide ideation/intent.  Rx helps.       6. Elevated fasting glucose-4.3.19 Chronic/stable.  No problem/pattern hypoglycemia/hyperglycemia manifest by poly- dypsia, phagia, uria, or sweats, diaphoretic episodes, syncope/near.       Has an/another acute issue today: Did I get a copy of her yearly pelvic ultrasound for [facility]?.  She has been doing this for 15 to 20 years ever since Dr. Ruggiero is recommended to her.  Patient had to be told I did not have a copy..    The following portions of the patient's history were reviewed and updated as " appropriate: allergies, current medications, past family history, past medical history, past social history, past surgical history and problem list.      Current Outpatient Medications:   •  cholecalciferol (VITAMIN D3) 1000 units tablet, Take 1,000 Units by mouth 2 (Two) Times a Day., Disp: , Rfl:   •  citalopram (CeleXA) 20 MG tablet, TAKE 1 TABLET BY MOUTH DAILY, Disp: 90 tablet, Rfl: 1  •  lansoprazole (Prevacid) 15 MG capsule, Take 15 mg by mouth Daily., Disp: , Rfl:   •  pravastatin (PRAVACHOL) 40 MG tablet, TAKE 1 TABLET BY MOUTH EVERY NIGHT, Disp: 90 tablet, Rfl: 3  •  albuterol sulfate  (90 Base) MCG/ACT inhaler, Inhale 2 puffs As Needed for Wheezing or Shortness of Air., Disp: 1 inhaler, Rfl: 3  •  omeprazole (priLOSEC) 20 MG capsule, Take 20 mg by mouth Daily., Disp: , Rfl:     No problems with medications.  Refills if needed done    Allergies   Allergen Reactions   • Bacitracin-Neomycin-Polymyxin Itching   • Dilaudid [Hydromorphone Hcl] Other (See Comments)     Oxygen stats dropped & mental altered status   • Promethazine Hcl Anxiety   • Ace Inhibitors Other (See Comments)     Family history of intolerance.   • Blephamide [Sulfacetamide-Prednisolone] Rash   • Neosporin [Neomycin-Bacitracin Zn-Polymyx] Rash   • Other Rash     Neodecadron eye drop     • Phenergan [Promethazine] Anxiety   • Sulfa Antibiotics Rash       Review of Systems  GENERAL:  Active/slower with limits, speed, stamina for age.  Sleep is ok; no orthopnea. No fever now.  SKIN: No rash/skin lesion of concern unless mentioned above/below.    ENDO:  No syncope, near or diaphoretic sweaty spells.  HEENT: No recent head injury; or headache.   No vision change.  No significant hearing loss.  Ears without pain/drainage.  No sore throat.  Same usual/not that significant nasal/sinus congestion/drainage. No epistaxis.  CHEST: No chest wall tenderness or mass.  Same infrequent cough,  without wheeze.  No SOB; no hemoptysis.  CV: No exertional  chest pain, palpitations, ankle edema.  GI: No heartburn, dysphagia.  No abdominal pain, diarrhea, constipation.  No rectal bleeding, or melena.    :  Voids without dysuria, or incontinence to completion.  ORTHO: No painful/swollen joints but various on /off sore.  No sore neck or back.  No acute neck or back pain without recent injury.  NEURO: No dizziness, weakness of extremities.  No numbness/paresthesias.   PSYCH: No memory loss.  Mood good; not anxious, depressed but/and not suicidal.  Tries to tolerate stress .      Screening:  Gyne: none (gets [facility] yearly pelvic US)   Mammogram: no TCC/epic: double masectomy  Bone density: 5.12.20/[facility]/bone density/LS -1.3 hip -0.7; doing very well; no change  Low dose CT chest: Tobacco-smoker/age 17/dc age 25 (<8p): NA  GI: Colon-polyp/Shieben/7.28.17/5y  Prostate: NA  Usual lab order  6m CBC  12m CBC, CMP, LIPID, TSH, Vit D,      Lab Results:  Results for orders placed or performed in visit on 10/06/20   Basic metabolic panel    Specimen: Blood   Result Value Ref Range    Glucose 97 65 - 99 mg/dL    BUN 9 8 - 23 mg/dL    Creatinine 0.72 0.57 - 1.00 mg/dL    eGFR Non African Am 79 >60 mL/min/1.73    eGFR African Am 95 >60 mL/min/1.73    BUN/Creatinine Ratio 12.5 7.0 - 25.0    Sodium 139 136 - 145 mmol/L    Potassium 4.0 3.5 - 5.2 mmol/L    Chloride 99 98 - 107 mmol/L    Total CO2 27.2 22.0 - 29.0 mmol/L    Calcium 9.7 8.6 - 10.5 mg/dL   Hemoglobin A1c    Specimen: Blood   Result Value Ref Range    Hemoglobin A1C 5.70 (H) 4.80 - 5.60 %   CBC & Differential    Specimen: Blood   Result Value Ref Range    WBC 5.64 3.40 - 10.80 10*3/mm3    RBC 5.13 3.77 - 5.28 10*6/mm3    Hemoglobin 14.6 12.0 - 15.9 g/dL    Hematocrit 42.6 34.0 - 46.6 %    MCV 83.0 79.0 - 97.0 fL    MCH 28.5 26.6 - 33.0 pg    MCHC 34.3 31.5 - 35.7 g/dL    RDW 13.1 12.3 - 15.4 %    Platelets 231 140 - 450 10*3/mm3    Neutrophil Rel % 60.5 42.7 - 76.0 %    Lymphocyte Rel % 29.4 19.6 - 45.3 %    Monocyte  "Rel % 6.7 5.0 - 12.0 %    Eosinophil Rel % 2.0 0.3 - 6.2 %    Basophil Rel % 0.9 0.0 - 1.5 %    Neutrophils Absolute 3.41 1.70 - 7.00 10*3/mm3    Lymphocytes Absolute 1.66 0.70 - 3.10 10*3/mm3    Monocytes Absolute 0.38 0.10 - 0.90 10*3/mm3    Eosinophils Absolute 0.11 0.00 - 0.40 10*3/mm3    Basophils Absolute 0.05 0.00 - 0.20 10*3/mm3    Immature Granulocyte Rel % 0.5 0.0 - 0.5 %    Immature Grans Absolute 0.03 0.00 - 0.05 10*3/mm3    nRBC 0.0 0.0 - 0.2 /100 WBC       A1C:  Lab Results - Last 18 Months   Lab Units 10/06/20  0708 04/01/20  0715 10/31/19  0720   HEMOGLOBIN A1C % 5.70* 5.84* 5.70*     PSA:No results for input(s): PSA in the last 53367 hours.  CBC:  Lab Results - Last 18 Months   Lab Units 10/06/20  0708 04/01/20  0715 10/31/19  0720   WBC 10*3/mm3 5.64 5.12 5.45   HEMOGLOBIN g/dL 14.6 14.4 14.0   HEMATOCRIT % 42.6 43.3 41.8   PLATELETS 10*3/mm3 231 221 237      BMP/CMP:  Lab Results - Last 18 Months   Lab Units 10/06/20  0708 04/01/20  0715   SODIUM mmol/L 139 139   POTASSIUM mmol/L 4.0 4.2   CHLORIDE mmol/L 99 100   TOTAL CO2 mmol/L 27.2 26.9   GLUCOSE mg/dL 97 99   BUN mg/dL 9 10   CREATININE mg/dL 0.72 0.67   EGFR IF NONAFRICN AM mL/min/1.73 79 86   EGFR IF AFRICN AM mL/min/1.73 95 104   CALCIUM mg/dL 9.7 9.5     HEPATIC:  Lab Results - Last 18 Months   Lab Units 04/01/20  0715   ALT (SGPT) U/L 17   AST (SGOT) U/L 22   ALK PHOS U/L 58     THYROID:  Lab Results - Last 18 Months   Lab Units 04/01/20  0715   TSH uIU/mL 3.180       Objective   /80 (BP Location: Right arm, Patient Position: Sitting, Cuff Size: Adult)   Pulse 70   Temp 98 °F (36.7 °C) (Infrared)   Resp 18   Ht 162.6 cm (64\")   Wt 72.1 kg (159 lb)   SpO2 98%   BMI 27.29 kg/m²   Body mass index is 27.29 kg/m².    Recent Vitals       1/29/2020 4/6/2020 10/12/2020       BP:  138/80  --  124/80     Pulse:  79  --  70     Temp:  98.7 °F (37.1 °C)  --  98 °F (36.7 °C)     Weight:  71.7 kg (158 lb)  67.8 kg (149 lb 8 oz)  72.1 kg " "(159 lb)       \"in my gown this morning\"      BMI (Calculated):  27.1  25.6  27.3           Physical Exam  GENERAL:  Well nourished/developed in no acute distress.   SKIN: Turgor excellent, without wound, rash, lesion.  HEENT: Normal cephalic without trauma.  Pupils equal round reactive to light. Extraocular motions full without nystagmus.   External canals nonobstructive nontender without reddness. Tymphatic membranes jenny with celia structures intact.   Oral cavity without growths, exudates, and moist.  Posterior pharynx without mass, obstruction, redness.  No thyromegaly, mass, tenderness, lymphadenopathy and supple.  CV: Regular rhythm.  No murmur, gallop,  edema. Posterior pulses intact.  No carotid bruits.  CHEST: No chest wall tenderness or mass.   LUNGS: Symmetric motion with clear to auscultation.  No dullness to percussion  ABD: Soft, nontender without mass.   ORTHO: Symmetric extremities without swelling/point tenderness.  Full gross range of motion.  NEURO: CN 2-12 grossly intact.  Symmetric facies and UE/LE. 3/5 strength throughout.  Nonfocal use extremities. Speech mildly hoarse.   PSYCH: Oriented x 3.  Pleasant calm, well kept.  Purposeful/directed conservation with intact short/long gross memory.       Assessment/Plan     1. Mixed hyperlipidemia    2. Gastroesophageal reflux disease, unspecified whether esophagitis present    3. Chronic back pain, unspecified back location, unspecified back pain laterality    4. Anxiety    5. Depression, unspecified depression type    6. Elevated fasting glucose-4.3.19        Discussions:   I do not have a copy of that pelvic ultrasound and she can get hold of the office involved and have them send one  Saying that    Rx: reviewed/changes:  No orders of the defined types were placed in this encounter.      LAB/Testing/Referrals: reviewed/orders:   Today:   No orders of the defined types were placed in this encounter.    Chronic/recurrent labs above or change to: "   same     Body mass index is 27.29 kg/m².  Patient's Body mass index is 27.29 kg/m². BMI is within normal parameters. No follow-up required..      Tobacco use reviewed:    Nikki Santillan  reports that she quit smoking about 51 years ago. Her smoking use included cigarettes. She started smoking about 58 years ago. She smoked 0.50 packs per day. She has never used smokeless tobacco..    There are no Patient Instructions on file for this visit.    Follow up: Return for lab 6m then lab/Dr Carrillo 12m.  Future Appointments   Date Time Provider Department Center   4/12/2021  8:15 AM [facility] None   10/11/2021  8:20 AM [facility] None   10/14/2021  9:00 AM Gael Carrillo MD [facility] None       Procedures none

## 2020-10-13 ENCOUNTER — TELEPHONE (OUTPATIENT)
Dept: FAMILY MEDICINE CLINIC | Facility: CLINIC | Age: 75
End: 2020-10-13

## 2020-10-13 NOTE — TELEPHONE ENCOUNTER
Caller: Nikki Santillan    Relationship to patient: Self    Best call back number: [phone]    Patient is needing: PATIENT HAD A OVARIAN ULTRASOUND DONE ON 09/28/2020 AND IT CAME BACK NORMAL. RESULTS ARE ONLY SENT IF SOMETHING COMES BACK ABNORMAL.

## 2020-10-23 ENCOUNTER — TELEPHONE (OUTPATIENT)
Dept: FAMILY MEDICINE CLINIC | Facility: CLINIC | Age: 75
End: 2020-10-23

## 2020-10-23 NOTE — TELEPHONE ENCOUNTER
Patient called and stated that she would like to ask some questions to her provider regarding some medications.    Please advise.    [phone]

## 2020-10-23 NOTE — TELEPHONE ENCOUNTER
Spoke to patient and she stated that she has been having bladder control issues. She said it is worse at night than during the day. She is wanting to know if there would be any medication to help or if she is needing a referral to urology.

## 2020-10-26 ENCOUNTER — OFFICE VISIT (OUTPATIENT)
Dept: FAMILY MEDICINE CLINIC | Facility: CLINIC | Age: 75
End: 2020-10-26

## 2020-10-26 VITALS
BODY MASS INDEX: 26.63 KG/M2 | OXYGEN SATURATION: 96 % | HEIGHT: 64 IN | HEART RATE: 73 BPM | WEIGHT: 156 LBS | SYSTOLIC BLOOD PRESSURE: 122 MMHG | DIASTOLIC BLOOD PRESSURE: 74 MMHG

## 2020-10-26 DIAGNOSIS — R32 URINARY INCONTINENCE, UNSPECIFIED TYPE: ICD-10-CM

## 2020-10-26 DIAGNOSIS — R35.1 NOCTURIA: ICD-10-CM

## 2020-10-26 PROCEDURE — 99213 OFFICE O/P EST LOW 20 MIN: CPT | Performed by: FAMILY MEDICINE

## 2020-10-26 NOTE — PROGRESS NOTES
Subjective   Nikki Santillan is a 75 y.o. female presenting with chief complaint of:   Chief Complaint   Patient presents with   • Bladder     Patient is here for Bladder Control Issues. Patient denies any stroke like symptoms.    • Med Management     Verbally verified medications with patients. Mrs Santillan verbalized all medications are correct andup to date.        History of Present Illness :  Alone.  Here for primarily bringing up today urinary issues (not brought up on recent yearly/regular exam).  For some time now she has an occasional feeling of pressure in the vaginal area.  Is also she has urinary leakage on and off.  It seems worse with a cough or straining.  She thinks she voids to completion.  There is no dysuria.   Something causes her to have to get up at night to void 1 or 2 times.    Has multiple chronic problems to consider that might have a bearing on today's issues; not an interval appointment.       Chronic/acute problems reviewed today:   1. Nocturia    2. Urinary incontinence, unspecified type      Has an/another acute issue today: none.    The following portions of the patient's history were reviewed and updated as appropriate: allergies, current medications, past family history, past medical history, past social history, past surgical history and problem list.      Current Outpatient Medications:   •  albuterol sulfate  (90 Base) MCG/ACT inhaler, Inhale 2 puffs As Needed for Wheezing or Shortness of Air., Disp: 1 inhaler, Rfl: 3  •  cholecalciferol (VITAMIN D3) 1000 units tablet, Take 1,000 Units by mouth 2 (Two) Times a Day., Disp: , Rfl:   •  citalopram (CeleXA) 20 MG tablet, TAKE 1 TABLET BY MOUTH DAILY, Disp: 90 tablet, Rfl: 1  •  lansoprazole (Prevacid) 15 MG capsule, Take 15 mg by mouth Daily., Disp: , Rfl:   •  omeprazole (priLOSEC) 20 MG capsule, Take 20 mg by mouth Daily., Disp: , Rfl:   •  pravastatin (PRAVACHOL) 40 MG tablet, TAKE 1 TABLET BY MOUTH EVERY NIGHT, Disp: 90 tablet, Rfl:  3    No problems with medications.  Refills if needed done    Allergies   Allergen Reactions   • Bacitracin-Neomycin-Polymyxin Itching   • Dilaudid [Hydromorphone Hcl] Other (See Comments)     Oxygen stats dropped & mental altered status   • Promethazine Hcl Anxiety   • Ace Inhibitors Other (See Comments)     Family history of intolerance.   • Blephamide [Sulfacetamide-Prednisolone] Rash   • Neosporin [Neomycin-Bacitracin Zn-Polymyx] Rash   • Other Rash     Neodecadron eye drop     • Phenergan [Promethazine] Anxiety   • Sulfa Antibiotics Rash       Review of Systems  GENERAL:  Active/slower with limits, speed, stamina for age.  Sleep is ok; no orthopnea. No fever now/recent.  SKIN: No rash/skin lesion of concern unless mentioned above/below.   ENDO:  No syncope, near or diaphoretic sweaty spells.  HEENT: No recent head injury; or headache.   No vision change.  No significant hearing loss.  Ears without pain/drainage.  No sore throat.  Same usual/not that significant nasal/sinus congestion/drainage. No epistaxis.  CHEST: No chest wall tenderness or mass.  Same infrequent cough,  without wheeze.  No SOB; no hemoptysis.  CV: No exertional chest pain, palpitations, ankle edema.  GI: No heartburn, dysphagia.  No abdominal pain, diarrhea, constipation.  No rectal bleeding, or melena.    :  Voids without dysuria, occ incontinence to completion.  ORTHO: No painful/swollen joints but various on /off sore.  No sore neck or back.  No acute neck or back pain without recent injury.  NEURO: No dizziness, weakness of extremities.  No numbness/paresthesias.   PSYCH: No memory loss.  Mood good; not anxious, depressed but/and not suicidal.  Tries to tolerate stress .      Screening:  Gyne: ?  Mammogram: no TCC/epic: double masectomy  Bone density: 12.17.14/12.17.14 [place] bone density..Ts hip -0.3 spine -1.2..looks good...no suggested changes..-agrees  Low dose CT chest: Tobacco-smoker/age 17/dc age 25 (<8p): NA  GI:  Colon-polyp/Shieben/7.28.17/5y  Prostate: NA  Usual lab order  6m CBC  12m CBC, CMP, LIPID, TSH, Vit D,      Lab Results:  Results for orders placed or performed in visit on 10/06/20   Basic metabolic panel    Specimen: Blood   Result Value Ref Range    Glucose 97 65 - 99 mg/dL    BUN 9 8 - 23 mg/dL    Creatinine 0.72 0.57 - 1.00 mg/dL    eGFR Non African Am 79 >60 mL/min/1.73    eGFR African Am 95 >60 mL/min/1.73    BUN/Creatinine Ratio 12.5 7.0 - 25.0    Sodium 139 136 - 145 mmol/L    Potassium 4.0 3.5 - 5.2 mmol/L    Chloride 99 98 - 107 mmol/L    Total CO2 27.2 22.0 - 29.0 mmol/L    Calcium 9.7 8.6 - 10.5 mg/dL   Hemoglobin A1c    Specimen: Blood   Result Value Ref Range    Hemoglobin A1C 5.70 (H) 4.80 - 5.60 %   CBC & Differential    Specimen: Blood   Result Value Ref Range    WBC 5.64 3.40 - 10.80 10*3/mm3    RBC 5.13 3.77 - 5.28 10*6/mm3    Hemoglobin 14.6 12.0 - 15.9 g/dL    Hematocrit 42.6 34.0 - 46.6 %    MCV 83.0 79.0 - 97.0 fL    MCH 28.5 26.6 - 33.0 pg    MCHC 34.3 31.5 - 35.7 g/dL    RDW 13.1 12.3 - 15.4 %    Platelets 231 140 - 450 10*3/mm3    Neutrophil Rel % 60.5 42.7 - 76.0 %    Lymphocyte Rel % 29.4 19.6 - 45.3 %    Monocyte Rel % 6.7 5.0 - 12.0 %    Eosinophil Rel % 2.0 0.3 - 6.2 %    Basophil Rel % 0.9 0.0 - 1.5 %    Neutrophils Absolute 3.41 1.70 - 7.00 10*3/mm3    Lymphocytes Absolute 1.66 0.70 - 3.10 10*3/mm3    Monocytes Absolute 0.38 0.10 - 0.90 10*3/mm3    Eosinophils Absolute 0.11 0.00 - 0.40 10*3/mm3    Basophils Absolute 0.05 0.00 - 0.20 10*3/mm3    Immature Granulocyte Rel % 0.5 0.0 - 0.5 %    Immature Grans Absolute 0.03 0.00 - 0.05 10*3/mm3    nRBC 0.0 0.0 - 0.2 /100 WBC       A1C:  Lab Results - Last 18 Months   Lab Units 10/06/20  0708 04/01/20  0715 10/31/19  0720   HEMOGLOBIN A1C % 5.70* 5.84* 5.70*     PSA:No results for input(s): PSA in the last 94698 hours.  CBC:  Lab Results - Last 18 Months   Lab Units 10/06/20  0708 04/01/20  0715 10/31/19  0720   WBC 10*3/mm3 5.64 5.12  "5.45   HEMOGLOBIN g/dL 14.6 14.4 14.0   HEMATOCRIT % 42.6 43.3 41.8   PLATELETS 10*3/mm3 231 221 237      BMP/CMP:  Lab Results - Last 18 Months   Lab Units 10/06/20  0708 04/01/20  0715   SODIUM mmol/L 139 139   POTASSIUM mmol/L 4.0 4.2   CHLORIDE mmol/L 99 100   TOTAL CO2 mmol/L 27.2 26.9   GLUCOSE mg/dL 97 99   BUN mg/dL 9 10   CREATININE mg/dL 0.72 0.67   EGFR IF NONAFRICN AM mL/min/1.73 79 86   EGFR IF AFRICN AM mL/min/1.73 95 104   CALCIUM mg/dL 9.7 9.5     HEPATIC:  Lab Results - Last 18 Months   Lab Units 04/01/20  0715   ALT (SGPT) U/L 17   AST (SGOT) U/L 22   ALK PHOS U/L 58     THYROID:  Lab Results - Last 18 Months   Lab Units 04/01/20  0715   TSH uIU/mL 3.180       Objective   /74 (BP Location: Right arm, Patient Position: Sitting, Cuff Size: Adult)   Pulse 73   Ht 162.6 cm (64\")   Wt 70.8 kg (156 lb)   SpO2 96%   BMI 26.78 kg/m²   Body mass index is 26.78 kg/m².    Recent Vitals       4/6/2020 10/12/2020 10/26/2020       BP:  --  124/80  122/74     Pulse:  --  70  73     Temp:  --  98 °F (36.7 °C)  --     Weight:  67.8 kg (149 lb 8 oz)  72.1 kg (159 lb)  70.8 kg (156 lb)      \"in my gown this morning\"       BMI (Calculated):  25.6  27.3  26.8           Physical Exam  Vitals signs and nursing note reviewed.   Constitutional:       Appearance: Normal appearance. She is normal weight.   HENT:      Head: Normocephalic.      Nose: Nose normal.      Mouth/Throat:      Mouth: Mucous membranes are moist.   Eyes:      Extraocular Movements: Extraocular movements intact.      Pupils: Pupils are equal, round, and reactive to light.   Neck:      Musculoskeletal: Neck supple.   Cardiovascular:      Rate and Rhythm: Normal rate and regular rhythm.      Heart sounds: No murmur.   Pulmonary:      Effort: Pulmonary effort is normal.      Breath sounds: Normal breath sounds.   Abdominal:      Palpations: Abdomen is soft.      Tenderness: There is no abdominal tenderness.   Skin:     General: Skin is warm and " dry.   Neurological:      Mental Status: She is alert.   Psychiatric:         Mood and Affect: Mood normal.         Behavior: Behavior normal.         Thought Content: Thought content normal.         Judgment: Judgment normal.         Assessment/Plan     1. Nocturia    2. Urinary incontinence, unspecified type      Discussions:   She deserves a more thorough exam and conversation about pelvic floor issues rather than historically trying to blame this on overactive bladder equal; and trying medication.  Primarily the reason for this is that she is so active for her age.    Rx: reviewed/changes:  No orders of the defined types were placed in this encounter.    LAB/Testing/Referrals: reviewed/orders:   Today:   Orders Placed This Encounter   Procedures   • Ambulatory Referral to Gynecology     Chronic/recurrent labs above or change to:   same     Body mass index is 26.78 kg/m².  Patient's Body mass index is 26.78 kg/m². BMI is within normal parameters. No follow-up required..      Tobacco use reviewed:    Nikki Santillan  reports that she quit smoking about 51 years ago. Her smoking use included cigarettes. She started smoking about 58 years ago. She smoked 0.50 packs per day. She has never used smokeless tobacco.     There are no Patient Instructions on file for this visit.    Follow up: Return for lab;, Dr Carrillo-, as planned;.  Future Appointments   Date Time Provider Department Center   4/12/2021  8:15 AM LAB [facility] [place] [facility] None   10/11/2021  8:20 AM LAB [facility] None   10/14/2021  9:00 AM Gael Carrillo MD [facility] [place] [facility] None       Procedures none

## 2020-10-29 ENCOUNTER — TELEPHONE (OUTPATIENT)
Dept: SURGERY | Age: 75
End: 2020-10-29

## 2020-10-29 NOTE — TELEPHONE ENCOUNTER
Patient called in and requesting to speak with Molly Obregon for a letter and order for her to get a prostheses and prosthetic bras. Please call the patient back and advise. Thank you  CC Molly Obregon.    [phone]

## 2020-10-30 NOTE — TELEPHONE ENCOUNTER
Spoke with pt. She is needing an order and letter faxed to [address] in [facility]..

## 2020-11-03 NOTE — TELEPHONE ENCOUNTER
Patient called in and stated that she called last week and needed a letter and order faxed. She stated that she called the shop that it should be sent to and it has not been received and she would like to know if it has been sent. Please call the patient back and advise. Thank you  BETTYE Vance.    [phone]

## 2020-11-23 ENCOUNTER — OFFICE VISIT (OUTPATIENT)
Dept: OBSTETRICS AND GYNECOLOGY | Facility: CLINIC | Age: 75
End: 2020-11-23

## 2020-11-23 VITALS
WEIGHT: 157 LBS | HEIGHT: 64 IN | BODY MASS INDEX: 26.8 KG/M2 | DIASTOLIC BLOOD PRESSURE: 84 MMHG | SYSTOLIC BLOOD PRESSURE: 132 MMHG

## 2020-11-23 DIAGNOSIS — N32.81 OAB (OVERACTIVE BLADDER): Primary | ICD-10-CM

## 2020-11-23 DIAGNOSIS — N39.46 MIXED STRESS AND URGE URINARY INCONTINENCE: ICD-10-CM

## 2020-11-23 PROCEDURE — 99203 OFFICE O/P NEW LOW 30 MIN: CPT | Performed by: OBSTETRICS & GYNECOLOGY

## 2020-11-23 RX ORDER — OXYBUTYNIN CHLORIDE 5 MG/1
5 TABLET ORAL DAILY
Qty: 90 TABLET | Refills: 0 | Status: SHIPPED | OUTPATIENT
Start: 2020-11-23 | End: 2020-12-21 | Stop reason: ALTCHOICE

## 2020-11-23 NOTE — PROGRESS NOTES
Lilly Santillan is a 75 y.o. female is being seen for consultation today at the request of Gael Carrillo MD     Patient presents as a new patient secondary to urinary symptoms    History of Present Illness     Symptoms include frequent urination, frequent nocturia, incontinence  She has some symptoms classic for stress including leakage of urine with coughing and sneezing  She also has urge incontinence symptoms that are classic  Symptoms seem to be worse with a full bladder  She has no problem emptying her bladder  She has no dysuria or flank pain  Symptoms of been present for years but have gotten worse over the last several months  We discussed the trial of physical therapy as well as urodynamic testing.  In addition, we discussed a trial of oral medicines  She has no classic prolapse symptoms    The following portions of the patient's history were reviewed and updated as appropriate: allergies, current medications, past family history, past medical history, past social history, past surgical history and problem list.    Review of Systems   Genitourinary: Positive for frequency and urgency. Negative for dysuria, hematuria and pelvic pain.   All other systems reviewed and are negative.      Objective   Physical Exam  Vitals signs and nursing note reviewed.   Constitutional:       Appearance: Normal appearance.   HENT:      Head: Normocephalic and atraumatic.      Mouth/Throat:      Mouth: Mucous membranes are moist.   Neck:      Musculoskeletal: Normal range of motion and neck supple.   Cardiovascular:      Rate and Rhythm: Normal rate and regular rhythm.      Pulses: Normal pulses.      Heart sounds: Normal heart sounds.   Pulmonary:      Effort: Pulmonary effort is normal.      Breath sounds: Normal breath sounds.   Abdominal:      General: Abdomen is flat.      Palpations: Abdomen is soft.   Skin:     General: Skin is warm and dry.   Neurological:      General: No focal deficit present.       Mental Status: She is alert and oriented to person, place, and time. Mental status is at baseline.   Psychiatric:         Mood and Affect: Mood normal.         Behavior: Behavior normal.         Thought Content: Thought content normal.         Judgment: Judgment normal.           Assessment/Plan   Diagnoses and all orders for this visit:    1. OAB (overactive bladder) (Primary)    2. Mixed stress and urge urinary incontinence    Other orders  -     oxybutynin (DITROPAN) 5 MG tablet; Take 1 tablet by mouth Daily.  Dispense: 90 tablet; Refill: 0      These are new problems that require treatment treatment and possibly further evaluation  Trial of anticholinergic  Side effect profile reviewed in detail  Call for concerns or questions  Return office in 1 month for follow-up  If continues to be problematic, consider further diagnostic testing  Could increase to 2 or 3 times a day dosing of medicine if she feels that it is helping for short period during the day    Bashir Padilla MD

## 2020-12-17 DIAGNOSIS — F41.9 ANXIETY: Chronic | ICD-10-CM

## 2020-12-17 DIAGNOSIS — F32.A DEPRESSION, UNSPECIFIED DEPRESSION TYPE: Chronic | ICD-10-CM

## 2020-12-17 RX ORDER — CITALOPRAM 20 MG/1
TABLET ORAL
Qty: 90 TABLET | Refills: 1 | Status: SHIPPED | OUTPATIENT
Start: 2020-12-17 | End: 2021-06-15

## 2020-12-21 ENCOUNTER — OFFICE VISIT (OUTPATIENT)
Dept: OBSTETRICS AND GYNECOLOGY | Facility: CLINIC | Age: 75
End: 2020-12-21

## 2020-12-21 VITALS
DIASTOLIC BLOOD PRESSURE: 82 MMHG | SYSTOLIC BLOOD PRESSURE: 142 MMHG | WEIGHT: 160 LBS | HEIGHT: 64 IN | BODY MASS INDEX: 27.31 KG/M2

## 2020-12-21 DIAGNOSIS — N32.81 OAB (OVERACTIVE BLADDER): Primary | ICD-10-CM

## 2020-12-21 DIAGNOSIS — Z80.3 FAMILY HISTORY OF BREAST CANCER: ICD-10-CM

## 2020-12-21 PROCEDURE — 99213 OFFICE O/P EST LOW 20 MIN: CPT | Performed by: NURSE PRACTITIONER

## 2020-12-21 NOTE — PROGRESS NOTES
Lilly Santillan is a 75 y.o. female  YOB: 1945      Chief Complaint   Patient presents with   • Overactive Bladder     pt here to f/u on overactive bladder, startd Ditropan 2020       Patient here for follow-up after starting oxybutynin for OAB.      The following portions of the patient's history were reviewed and updated as appropriate: allergies, current medications, past family history, past medical history, past social history, past surgical history and problem list.    Allergies   Allergen Reactions   • Bacitracin-Neomycin-Polymyxin Itching   • Dilaudid [Hydromorphone Hcl] Other (See Comments)     Oxygen stats dropped & mental altered status   • Promethazine Hcl Anxiety   • Ace Inhibitors Other (See Comments)     Family history of intolerance.   • Blephamide [Sulfacetamide-Prednisolone] Rash   • Neosporin [Neomycin-Bacitracin Zn-Polymyx] Rash   • Other Rash     Neodecadron eye drop     • Phenergan [Promethazine] Anxiety   • Sulfa Antibiotics Rash       Past Medical History:   Diagnosis Date   • Cancer (CMS/[facility])     breast cancer x 3  -  was last mastectomy   • Colon polyp    • Hyperlipidemia        Family History   Problem Relation Age of Onset   • Breast cancer Mother    • Heart valve disorder Mother    • Brain cancer Father    • Hypertension Sister    • Hyperlipidemia Sister    • Breast cancer Sister    • Hyperlipidemia Sister    • Hypertension Sister    • Breast cancer Maternal Grandmother    • Colon cancer Neg Hx    • Colon polyps Neg Hx        Social History     Socioeconomic History   • Marital status: Single     Spouse name:    • Number of children: 2   • Years of education: 13   • Highest education level: Not on file   Occupational History   • Occupation: semi retired     Comment:    Tobacco Use   • Smoking status: Former Smoker     Packs/day: 0.50     Types: Cigarettes     Start date: 1962     Quit date: 1969     Years since quittin.9   •  Smokeless tobacco: Never Used   Substance and Sexual Activity   • Alcohol use: No   • Drug use: No   • Sexual activity: Defer         Current Outpatient Medications:   •  albuterol sulfate  (90 Base) MCG/ACT inhaler, Inhale 2 puffs As Needed for Wheezing or Shortness of Air., Disp: 1 inhaler, Rfl: 3  •  cholecalciferol (VITAMIN D3) 1000 units tablet, Take 1,000 Units by mouth 2 (Two) Times a Day., Disp: , Rfl:   •  citalopram (CeleXA) 20 MG tablet, TAKE 1 TABLET BY MOUTH DAILY, Disp: 90 tablet, Rfl: 1  •  omeprazole (priLOSEC) 20 MG capsule, Take 20 mg by mouth Daily., Disp: , Rfl:   •  pravastatin (PRAVACHOL) 40 MG tablet, TAKE 1 TABLET BY MOUTH EVERY NIGHT, Disp: 90 tablet, Rfl: 3  •  Mirabegron ER (Myrbetriq) 25 MG tablet sustained-release 24 hour 24 hr tablet, Take 1 tablet by mouth Daily., Disp: 30 tablet, Rfl: 0    No LMP recorded. Patient is postmenopausal.    Sexual History:         Could not be calculated    Past Surgical History:   Procedure Laterality Date   • APPENDECTOMY     • COLONOSCOPY  12/16/2011   • COLONOSCOPY N/A 7/28/2017    Procedure: COLONOSCOPY WITH ANESTHESIA;  Surgeon: Chapin Valverde MD;  Location: [facility] ENDOSCOPY;  Service:    • COLONOSCOPY  2002   • DILATION AND CURETTAGE, DIAGNOSTIC / THERAPEUTIC     • MASTECTOMY      lumpectomy 2003 right and had xrt,  11/2004 right ,1/2012 left,    • TONSILLECTOMY         Review of Systems   Constitutional: Negative for activity change, appetite change, chills, diaphoresis, fatigue, fever and unexpected weight change.   HENT: Negative for congestion, dental problem, drooling, ear discharge, ear pain, facial swelling, hearing loss, mouth sores, nosebleeds, postnasal drip, rhinorrhea, sinus pressure, sinus pain, sneezing, sore throat, tinnitus, trouble swallowing and voice change.    Eyes: Negative for photophobia, pain, discharge, redness, itching and visual disturbance.   Respiratory: Negative for apnea, cough, choking, chest tightness,  shortness of breath, wheezing and stridor.    Cardiovascular: Negative for chest pain, palpitations and leg swelling.   Gastrointestinal: Negative for abdominal distention, abdominal pain, anal bleeding, blood in stool, constipation, diarrhea, nausea, rectal pain and vomiting.   Endocrine: Negative for cold intolerance, heat intolerance, polydipsia, polyphagia and polyuria.   Genitourinary: Negative for decreased urine volume, difficulty urinating, dyspareunia, dysuria, enuresis, flank pain, frequency, genital sores, hematuria, menstrual problem, pelvic pain, urgency, vaginal bleeding, vaginal discharge and vaginal pain.        OAB   Musculoskeletal: Negative for arthralgias, back pain, gait problem, joint swelling, myalgias, neck pain and neck stiffness.   Skin: Negative for color change, pallor, rash and wound.   Allergic/Immunologic: Negative for environmental allergies, food allergies and immunocompromised state.   Neurological: Negative for dizziness, tremors, seizures, syncope, facial asymmetry, speech difficulty, weakness, light-headedness, numbness and headaches.   Hematological: Negative for adenopathy. Does not bruise/bleed easily.   Psychiatric/Behavioral: Negative for agitation, behavioral problems, confusion, decreased concentration, dysphoric mood, hallucinations, self-injury, sleep disturbance and suicidal ideas. The patient is not nervous/anxious and is not hyperactive.        Objective   Physical Exam  Vitals signs and nursing note reviewed.   Constitutional:       Appearance: She is well-developed.   HENT:      Head: Normocephalic.   Eyes:      Pupils: Pupils are equal, round, and reactive to light.   Neck:      Musculoskeletal: Normal range of motion.   Cardiovascular:      Rate and Rhythm: Normal rate and regular rhythm.   Pulmonary:      Effort: Pulmonary effort is normal.      Breath sounds: Normal breath sounds.   Abdominal:      Palpations: Abdomen is soft.   Musculoskeletal: Normal range of  "motion.   Skin:     General: Skin is warm and dry.   Neurological:      Mental Status: She is alert and oriented to person, place, and time.   Psychiatric:         Behavior: Behavior normal.           Vitals:    12/21/20 1454   BP: 142/82   BP Location: Left arm   Patient Position: Sitting   Weight: 72.6 kg (160 lb)   Height: 162.6 cm (64\")       Diagnoses and all orders for this visit:    1. OAB (overactive bladder) (Primary)  Comments:  Patient here for follow-up after starting oxybutynin r/t OAB.  States it has greatly decreased her frequency and especially her nocturia but makes her mouth incredibly dry.  Discussed other options.  Patient wants to try Myrbetriq.  1 month supply sent to pharmacy and patient instructed in use.  To let me know if she wants to stay on this or go back to the oxybutynin.  RTO for yearly or sooner as needed.  Orders:  -     Mirabegron ER (Myrbetriq) 25 MG tablet sustained-release 24 hour 24 hr tablet; Take 1 tablet by mouth Daily.  Dispense: 30 tablet; Refill: 0    2. Family history of breast cancer  Comments:  Patient has a family history of breast cancer in herself x3, her mother, her sister, and her MGM.  Referral made for genetic testing/[id number] pending results.  Orders:  -     Ambulatory Referral to Genetic Counseling/Testing - [facility]          Patient's Body mass index is 27.46 kg/m². BMI is above normal parameters. Recommendations include: exercise counseling and nutrition counseling.             Non-Smoker    MyChart Instructions Given       "

## 2020-12-22 ENCOUNTER — TELEPHONE (OUTPATIENT)
Dept: OBSTETRICS AND GYNECOLOGY | Facility: CLINIC | Age: 75
End: 2020-12-22

## 2020-12-22 NOTE — TELEPHONE ENCOUNTER
Medication that was prescribed for overactive bladder was declined by patient due to the pricing of the medication. Patient states that she is continuing the medication that Dr Padilla prescribed. Patient states that she is wanting to be contacted as soon as possible.    [phone]

## 2021-01-21 ENCOUNTER — DOCUMENTATION (OUTPATIENT)
Dept: LAB | Facility: HOSPITAL | Age: 76
End: 2021-01-21

## 2021-01-21 DIAGNOSIS — Z85.3 PERSONAL HISTORY OF BREAST CANCER: Primary | ICD-10-CM

## 2021-01-21 NOTE — PROGRESS NOTES
Based upon having an elevated risk for developing hereditary cancer as determined by risk assessment tools,genetic testing is recommended.

## 2021-01-25 ENCOUNTER — LAB (OUTPATIENT)
Dept: LAB | Facility: HOSPITAL | Age: 76
End: 2021-01-25

## 2021-01-25 DIAGNOSIS — Z85.3 PERSONAL HISTORY OF BREAST CANCER: ICD-10-CM

## 2021-02-04 ENCOUNTER — DOCUMENTATION (OUTPATIENT)
Dept: LAB | Facility: HOSPITAL | Age: 76
End: 2021-02-04

## 2021-02-08 ENCOUNTER — TELEPHONE (OUTPATIENT)
Dept: FAMILY MEDICINE CLINIC | Facility: CLINIC | Age: 76
End: 2021-02-08

## 2021-02-08 DIAGNOSIS — Z20.822 EXPOSURE TO COVID-19 VIRUS: Primary | ICD-10-CM

## 2021-02-08 NOTE — TELEPHONE ENCOUNTER
PATIENT CALLED STATING SHE WAS EXPOSED TO COVID LAST WEEK AND HAS NO SYMPTOMS HOWEVER SHE WOULD LIKE A CALL BACK AS SHE HAS QUESTIONS AS WHAT TO DO.    PLEASE CALL AND ADVISE  [phone]

## 2021-02-19 RX ORDER — OXYBUTYNIN CHLORIDE 5 MG/1
5 TABLET ORAL DAILY
Qty: 90 TABLET | Refills: 0 | OUTPATIENT
Start: 2021-02-19 | End: 2022-02-19

## 2021-03-05 ENCOUNTER — OFFICE VISIT (OUTPATIENT)
Dept: OBSTETRICS AND GYNECOLOGY | Facility: CLINIC | Age: 76
End: 2021-03-05

## 2021-03-05 VITALS
DIASTOLIC BLOOD PRESSURE: 74 MMHG | BODY MASS INDEX: 27.31 KG/M2 | HEIGHT: 64 IN | SYSTOLIC BLOOD PRESSURE: 122 MMHG | WEIGHT: 160 LBS

## 2021-03-05 DIAGNOSIS — N32.81 OAB (OVERACTIVE BLADDER): Primary | ICD-10-CM

## 2021-03-05 DIAGNOSIS — N39.46 MIXED STRESS AND URGE URINARY INCONTINENCE: ICD-10-CM

## 2021-03-05 DIAGNOSIS — Z78.9 NON-SMOKER: ICD-10-CM

## 2021-03-05 PROCEDURE — 99213 OFFICE O/P EST LOW 20 MIN: CPT | Performed by: OBSTETRICS & GYNECOLOGY

## 2021-03-05 RX ORDER — OXYBUTYNIN CHLORIDE 5 MG/1
5 TABLET, EXTENDED RELEASE ORAL DAILY
COMMUNITY
End: 2021-03-05

## 2021-03-05 RX ORDER — OXYBUTYNIN CHLORIDE 5 MG/1
5 TABLET, EXTENDED RELEASE ORAL DAILY
Qty: 30 TABLET | Refills: 6 | Status: CANCELLED | OUTPATIENT
Start: 2021-03-05

## 2021-03-05 NOTE — PROGRESS NOTES
"Chief Complaint  Follow-up (pt here to f/u on Ditropan, reports dry mouth but improvment with OAB)    Subjective          Nikki Santillan presents to [facility]  History of Present Illness  Nikki Santillan presents today for follow-up evaluation for overactive bladder and urinary leakage. She reports she returned to the office a month later and she saw Belia. Belia changed the prescription for the patient due to dry mouth with oxybutynin. The patient reports the prescription was 390 dollars. She adds she did not fill the prescription and continued with the oxybutynin. She is reports she ran out of oxybutynin 10 days ago and her symptoms have since returned.     She states there was significant improvement of bladder symptoms while taking oxybutynin. She denies any constipation and dry eyes. She reports having significant dry mouth from taking oxybutynin.     Objective   Vital Signs:   /74 (BP Location: Right arm, Patient Position: Sitting, Cuff Size: Adult)   Ht 162.6 cm (64\")   Wt 72.6 kg (160 lb)   BMI 27.46 kg/m²     Physical Exam  Vitals signs and nursing note reviewed. Exam conducted with a chaperone present.   Constitutional:       Appearance: Normal appearance.   HENT:      Head: Normocephalic and atraumatic.   Neck:      Musculoskeletal: Normal range of motion and neck supple.   Abdominal:      General: Abdomen is flat. Bowel sounds are normal.      Palpations: Abdomen is soft.   Musculoskeletal: Normal range of motion.   Skin:     General: Skin is warm and dry.   Neurological:      General: No focal deficit present.      Mental Status: She is alert and oriented to person, place, and time. Mental status is at baseline.   Psychiatric:         Mood and Affect: Mood normal.         Behavior: Behavior normal.         Thought Content: Thought content normal.         Judgment: Judgment normal.        Result Review :                 Assessment and Plan    Diagnoses and all orders for this " visit:    1. OAB (overactive bladder) (Primary)    2. Mixed stress and urge urinary incontinence    3. Non-smoker    Other orders  -     Cancel: oxybutynin XL (DITROPAN-XL) 5 MG 24 hr tablet; Take 1 tablet by mouth Daily.  Dispense: 30 tablet; Refill: 6      I discussed other medication options with the patient to help relieve her bladder symptoms and improve side effects. A 4-week sample of a different anticholinergic was provided today. InterStim was discussed if different medications do not improve bladder symptoms with less side effects.    Trial of Myrbetriq 25 mg QD (4 weeks samples provided)    I will see her back in 1 month.       Follow Up   Return in about 4 weeks (around 4/2/2021).  Patient was given instructions and counseling regarding her condition or for health maintenance advice. Please see specific information pulled into the AVS if appropriate.     Bashir Padilla MD    I Bashir Padilla MD have personally performed the services described in this document as scribed by the above individual, and it is both accurate and complete.     Bashir Padilla MD  3/5/2021  12:32 CST

## 2021-04-05 ENCOUNTER — OFFICE VISIT (OUTPATIENT)
Dept: OBSTETRICS AND GYNECOLOGY | Facility: CLINIC | Age: 76
End: 2021-04-05

## 2021-04-05 VITALS
WEIGHT: 159 LBS | DIASTOLIC BLOOD PRESSURE: 78 MMHG | HEIGHT: 64 IN | SYSTOLIC BLOOD PRESSURE: 128 MMHG | BODY MASS INDEX: 27.14 KG/M2

## 2021-04-05 DIAGNOSIS — Z78.9 NON-SMOKER: ICD-10-CM

## 2021-04-05 DIAGNOSIS — N39.46 MIXED STRESS AND URGE URINARY INCONTINENCE: ICD-10-CM

## 2021-04-05 DIAGNOSIS — N32.81 OAB (OVERACTIVE BLADDER): Primary | ICD-10-CM

## 2021-04-05 PROCEDURE — 99213 OFFICE O/P EST LOW 20 MIN: CPT | Performed by: OBSTETRICS & GYNECOLOGY

## 2021-04-05 NOTE — PROGRESS NOTES
"Chief Complaint  Urinary Incontinence (pt here to f/u on OAB, reports improvement with dry mouth with Myrbetriq but not as good success with urinary incontinence compared to Ditropan)         Subjective          Nikki Santillan presents to [facility]  History of Present Illness     The patient presents today for a follow-up evaluation regarding urinary incontinence. She has tried Ditropan with significant dry mouth, however greatly improved the patient's urinary symptoms. Myrbetriq was the next medication the patient tried; dry mouth was much better on this medication, however urinary improvement did not improve as much.     Objective   Vital Signs:   /78 (BP Location: Left arm, Patient Position: Sitting, Cuff Size: Adult)   Ht 162.6 cm (64\")   Wt 72.1 kg (159 lb)   BMI 27.29 kg/m²     Physical Exam  Vitals and nursing note reviewed. Exam conducted with a chaperone present.   Constitutional:       Appearance: Normal appearance.   HENT:      Head: Normocephalic and atraumatic.   Abdominal:      General: Abdomen is flat. Bowel sounds are normal.      Palpations: Abdomen is soft.   Musculoskeletal:         General: Normal range of motion.      Cervical back: Normal range of motion and neck supple.   Skin:     General: Skin is warm and dry.   Neurological:      General: No focal deficit present.      Mental Status: She is alert and oriented to person, place, and time. Mental status is at baseline.   Psychiatric:         Mood and Affect: Mood normal.         Behavior: Behavior normal.         Thought Content: Thought content normal.         Judgment: Judgment normal.        Result Review :                 Assessment and Plan    Diagnoses and all orders for this visit:    1. OAB (overactive bladder) (Primary)  -     Mirabegron ER (Myrbetriq) 50 MG tablet sustained-release 24 hour 24 hr tablet; Take 50 mg by mouth Daily.  Dispense: 30 tablet; Refill: 0    2. Mixed stress and urge urinary " incontinence  -     Mirabegron ER (Myrbetriq) 50 MG tablet sustained-release 24 hour 24 hr tablet; Take 50 mg by mouth Daily.  Dispense: 30 tablet; Refill: 0    3. Non-smoker      I discussed the patient's options for treatment. I will increase the Myrbetriq to 50 mg. I provided her will samples today.       Follow Up   Return in about 1 month (around 5/5/2021).  Patient was given instructions and counseling regarding her condition or for health maintenance advice. Please see specific information pulled into the AVS if appropriate.     Scribed for Bashir Padilla MD by Natasha Fuchs.  04/05/21   11:52 CDT    I have personally performed the services described in this document as scribed by the above individual, and it is both accurate and complete.  Bashir Padilla MD  4/5/2021  12:48 CDT

## 2021-04-12 ENCOUNTER — LAB (OUTPATIENT)
Dept: FAMILY MEDICINE CLINIC | Facility: CLINIC | Age: 76
End: 2021-04-12

## 2021-04-12 DIAGNOSIS — Z01.89 LABORATORY TEST: ICD-10-CM

## 2021-04-12 DIAGNOSIS — R73.01 ELEVATED FASTING GLUCOSE: ICD-10-CM

## 2021-04-12 DIAGNOSIS — Z00.00 WELLNESS EXAMINATION: ICD-10-CM

## 2021-04-12 DIAGNOSIS — E55.9 VITAMIN D DEFICIENCY: ICD-10-CM

## 2021-04-12 DIAGNOSIS — E78.2 MIXED HYPERLIPIDEMIA: Primary | ICD-10-CM

## 2021-05-03 ENCOUNTER — OFFICE VISIT (OUTPATIENT)
Dept: OBSTETRICS AND GYNECOLOGY | Facility: CLINIC | Age: 76
End: 2021-05-03

## 2021-05-03 VITALS
WEIGHT: 158 LBS | BODY MASS INDEX: 26.98 KG/M2 | HEIGHT: 64 IN | DIASTOLIC BLOOD PRESSURE: 90 MMHG | SYSTOLIC BLOOD PRESSURE: 144 MMHG

## 2021-05-03 DIAGNOSIS — N39.46 MIXED STRESS AND URGE URINARY INCONTINENCE: ICD-10-CM

## 2021-05-03 DIAGNOSIS — Z78.9 NON-SMOKER: ICD-10-CM

## 2021-05-03 DIAGNOSIS — N32.81 OAB (OVERACTIVE BLADDER): Primary | ICD-10-CM

## 2021-05-03 PROCEDURE — 99213 OFFICE O/P EST LOW 20 MIN: CPT | Performed by: OBSTETRICS & GYNECOLOGY

## 2021-05-03 NOTE — PROGRESS NOTES
"Chief Complaint  Follow-up (pt here to f/u on increasing Myrbetriq but does not report much improvement )    Subjective          Nikki Santillan presents to [facility]  History of Present Illness  The patient presents today for a follow-up visit regarding overactive bladder, and urge incontinence. The patient reports Ditropan has best controlled her symptoms, however, caused her dry mouth. She then tried Myrbetriq with improvement of symptoms, and no side effects, however, overactive bladder symptoms are still present.     Objective   Vital Signs:   /90 (BP Location: Left arm, Patient Position: Sitting, Cuff Size: Adult)   Ht 162.6 cm (64\")   Wt 71.7 kg (158 lb)   BMI 27.12 kg/m²     Physical Exam  Vitals and nursing note reviewed.   Constitutional:       Appearance: Normal appearance.   HENT:      Head: Normocephalic and atraumatic.   Abdominal:      General: Abdomen is flat. Bowel sounds are normal.      Palpations: Abdomen is soft.   Musculoskeletal:         General: Normal range of motion.      Cervical back: Normal range of motion and neck supple.   Skin:     General: Skin is warm and dry.   Neurological:      General: No focal deficit present.      Mental Status: She is alert and oriented to person, place, and time. Mental status is at baseline.   Psychiatric:         Mood and Affect: Mood normal.         Behavior: Behavior normal.         Thought Content: Thought content normal.         Judgment: Judgment normal.        Result Review :   The following data was reviewed by: Bashir Padilla MD on 05/03/2021:  Common labs    Common Labsle 10/6/20 10/6/20 10/6/20 4/12/21 4/12/21 4/12/21 4/12/21    0708 0708 0708 0707 0707 0707 0707   Glucose  97   105 (A)     BUN  9   14     Creatinine  0.72   0.64     eGFR Non  Am  79   90     eGFR  Am  95   109     Sodium  139   140     Potassium  4.0   3.8     Chloride  99   102     Calcium  9.7   10.0     Total Protein     6.6   "   Albumin     4.60     Total Bilirubin     0.4     Alkaline Phosphatase     60     AST (SGOT)     22     ALT (SGPT)     18     WBC 5.64   4.97      Hemoglobin 14.6   14.3      Hematocrit 42.6   43.1      Platelets 231   239      Total Cholesterol       191   Triglycerides       118   HDL Cholesterol       57   LDL Cholesterol        113 (A)   Hemoglobin A1C   5.70 (A)   5.90 (A)    (A) Abnormal value       Comments are available for some flowsheets but are not being displayed.                     Assessment and Plan    Diagnoses and all orders for this visit:    1. OAB (overactive bladder) (Primary)    2. Mixed stress and urge urinary incontinence    3. Non-smoker      We discussed all options today, and why certain options will not work for her. I think it would be best for the patient to see Dr. Flores regarding the patient's symptoms. The patient will make an appointment with Dr. Flores today before she leaves.       Follow Up   Return make appt with Mark to discuss OAB and interstim after urodynamic testing, for urodynamics.  Patient was given instructions and counseling regarding her condition or for health maintenance advice. Please see specific information pulled into the AVS if appropriate.     Scribed for Bashir Padilla MD by Natasha Fuchs.  05/03/21   12:40 CDT    I have personally performed the services described in this document as scribed by the above individual, and it is both accurate and complete.  Bashir Padilla MD  5/3/2021  12:43 CDT

## 2021-05-06 DIAGNOSIS — E78.5 HYPERLIPIDEMIA, UNSPECIFIED HYPERLIPIDEMIA TYPE: Chronic | ICD-10-CM

## 2021-05-06 RX ORDER — PRAVASTATIN SODIUM 40 MG
40 TABLET ORAL NIGHTLY
Qty: 90 TABLET | Refills: 3 | Status: SHIPPED | OUTPATIENT
Start: 2021-05-06 | End: 2021-08-02 | Stop reason: SDUPTHER

## 2021-05-27 ENCOUNTER — OUTSIDE FACILITY SERVICE (OUTPATIENT)
Dept: OBSTETRICS AND GYNECOLOGY | Facility: CLINIC | Age: 76
End: 2021-05-27

## 2021-05-27 PROCEDURE — 51797 INTRAABDOMINAL PRESSURE TEST: CPT | Performed by: OBSTETRICS & GYNECOLOGY

## 2021-05-27 PROCEDURE — 51729 CYSTOMETROGRAM W/VP&UP: CPT | Performed by: OBSTETRICS & GYNECOLOGY

## 2021-05-27 PROCEDURE — 51784 ANAL/URINARY MUSCLE STUDY: CPT | Performed by: OBSTETRICS & GYNECOLOGY

## 2021-05-27 PROCEDURE — 51741 ELECTRO-UROFLOWMETRY FIRST: CPT | Performed by: OBSTETRICS & GYNECOLOGY

## 2021-06-10 ENCOUNTER — OFFICE VISIT (OUTPATIENT)
Dept: OBSTETRICS AND GYNECOLOGY | Facility: CLINIC | Age: 76
End: 2021-06-10

## 2021-06-10 VITALS
WEIGHT: 156 LBS | BODY MASS INDEX: 26.63 KG/M2 | DIASTOLIC BLOOD PRESSURE: 86 MMHG | SYSTOLIC BLOOD PRESSURE: 134 MMHG | HEIGHT: 64 IN

## 2021-06-10 DIAGNOSIS — N39.3 SUI (STRESS URINARY INCONTINENCE, FEMALE): ICD-10-CM

## 2021-06-10 DIAGNOSIS — Z78.9 NON-SMOKER: ICD-10-CM

## 2021-06-10 DIAGNOSIS — N32.81 OAB (OVERACTIVE BLADDER): Primary | ICD-10-CM

## 2021-06-10 DIAGNOSIS — Z78.0 MENOPAUSE: ICD-10-CM

## 2021-06-10 PROCEDURE — 99214 OFFICE O/P EST MOD 30 MIN: CPT | Performed by: OBSTETRICS & GYNECOLOGY

## 2021-06-10 NOTE — PROGRESS NOTES
Subjective     Chief Complaint   Patient presents with   • Urinary Frequency     pt here today for follow up after having urodynamics testing. pt voices she is up every 2-3 hours at night. pt voices no other concerns.         Nikki Santillan is a 76 y.o. year old who presents to be seen for pelvic prolapse.  She has been followed by Dr. Padilla and recently had urodynamic testing showing both OSVALDO and detrusor overactivity.    The patient is not s/p hysterectomy.  Nikki's primary complaint is urinary frequency, which is worse during the night than during the day.  Patient reports she is up 2 or 3 times every night.  She does feel like bladder empties completley.  Patient does have some OSVALDO with sneezing, laughing, or coughing.  The patient has a chronic cough and reports that this occurs daily - she has to wear a pad all the time.  She is not currently sexually active.  Nikki Santillan has not had surgery for this problem in the past.    Patient had improvement with Oxybutynin but could not tolerate side effects.  She then tried two newer anticholinergics, and while she did not have dry eyes or constipation, they were not effective for OAB/frequency.  Patient drinks coffee and tea all day long, and puts artificial sweetener in both of them.  She does not drink alcohol.      Past Medical History:   Diagnosis Date   • Cancer (CMS/[facility])     breast cancer x 3 2003 - 2012 was last mastectomy   • Colon polyp    • Hyperlipidemia        Current Outpatient Medications:   •  albuterol sulfate  (90 Base) MCG/ACT inhaler, Inhale 2 puffs As Needed for Wheezing or Shortness of Air., Disp: 1 inhaler, Rfl: 3  •  cholecalciferol (VITAMIN D3) 1000 units tablet, Take 1,000 Units by mouth 2 (Two) Times a Day., Disp: , Rfl:   •  citalopram (CeleXA) 20 MG tablet, TAKE 1 TABLET BY MOUTH DAILY, Disp: 90 tablet, Rfl: 1  •  omeprazole (priLOSEC) 20 MG capsule, Take 20 mg by mouth Daily., Disp: , Rfl:   •  pravastatin (PRAVACHOL) 40 MG tablet, Take  1 tablet by mouth Every Night., Disp: 90 tablet, Rfl: 3  Family History   Problem Relation Age of Onset   • Breast cancer Mother    • Heart valve disorder Mother    • Brain cancer Father    • Hypertension Sister    • Hyperlipidemia Sister    • Breast cancer Sister    • Hyperlipidemia Sister    • Hypertension Sister    • Breast cancer Maternal Grandmother    • Colon cancer Neg Hx    • Colon polyps Neg Hx      Social History     Socioeconomic History   • Marital status:      Spouse name:    • Number of children: 2   • Years of education: 13   • Highest education level: Not on file   Tobacco Use   • Smoking status: Former Smoker     Packs/day: 0.50     Types: Cigarettes     Start date: 1962     Quit date: 1969     Years since quittin.4   • Smokeless tobacco: Never Used   Substance and Sexual Activity   • Alcohol use: No   • Drug use: No   • Sexual activity: Defer     Allergies   Allergen Reactions   • Bacitracin-Neomycin-Polymyxin Itching   • Dilaudid [Hydromorphone Hcl] Other (See Comments)     Oxygen stats dropped & mental altered status   • Promethazine Hcl Anxiety   • Ace Inhibitors Other (See Comments)     Family history of intolerance.   • Blephamide [Sulfacetamide-Prednisolone] Rash   • Neosporin [Neomycin-Bacitracin Zn-Polymyx] Rash   • Other Rash     Neodecadron eye drop     • Phenergan [Promethazine] Anxiety   • Sulfa Antibiotics Rash       Family History   Problem Relation Age of Onset   • Breast cancer Mother    • Heart valve disorder Mother    • Brain cancer Father    • Hypertension Sister    • Hyperlipidemia Sister    • Breast cancer Sister    • Hyperlipidemia Sister    • Hypertension Sister    • Breast cancer Maternal Grandmother    • Colon cancer Neg Hx    • Colon polyps Neg Hx      Review of Systems   Constitutional: Negative for activity change and unexpected weight change.   Respiratory: Negative for shortness of breath.    Cardiovascular: Negative for chest pain.  "  Gastrointestinal: Negative for constipation.   Genitourinary: Positive for enuresis.           Objective   /86   Ht 162.6 cm (64\")   Wt 70.8 kg (156 lb)   LMP  (LMP Unknown)   BMI 26.78 kg/m²     Physical Exam  Vitals and nursing note reviewed.   Constitutional:       General: She is not in acute distress.     Appearance: She is well-developed.   HENT:      Head: Normocephalic and atraumatic.   Neck:      Thyroid: No thyromegaly.   Pulmonary:      Effort: Pulmonary effort is normal.   Abdominal:      General: There is no distension.      Palpations: Abdomen is soft.      Tenderness: There is no abdominal tenderness.   Genitourinary:     General: Normal vulva.      Comments: Speculum exam remarkable only for pale mucosa and loss of rugae.  Cervix well supported.  Very minimal cystocele or rectocele; the patient definitely has no prolapse which is contributing to bladder dysfunction.  Musculoskeletal:         General: Normal range of motion.      Cervical back: Normal range of motion.   Skin:     General: Skin is warm and dry.   Neurological:      Mental Status: She is alert and oriented to person, place, and time.   Psychiatric:         Behavior: Behavior normal.         Judgment: Judgment normal.         Imaging   No data reviewed       Assessment & Plan    Diagnoses and all orders for this visit:    1. OAB (overactive bladder) (Primary): Long discussion with patient explaining the difference between overactive bladder/urge incontinence and stress incontinence.  Patient is already tried multiple anticholinergic medications without success.  We have discussed caffeine, artificial sweeteners, citrus products, and alcohol as common bladder irritants.  I have encouraged the patient to cut out all of her artificial sweetener, which she puts in coffee and tea during the day.  We have also discussed InterStim as another treatment option; the patient understands that there is a temporary 5-day version of the " device which can be placed in the office as a trial.    2. OSVALDO (stress urinary incontinence, female): I explained to the patient that stress incontinence requires a physical correction, and that no medication or stimulator would help with a weak sphincter muscle.  The surgical procedure of a mid urethral sling, and how that inhibits leakage was explained to the patient.  She has been strongly encouraged to try physical therapy since that is the only treatment modality that has been shown to be effective for both stress incontinence and urge incontinence.  Patient is agreeable to this, and referral has been placed.  Patient return to the office in 4 weeks for follow-up    3. Menopause    4. Non-smoker    5. BMI 26.0-26.9,adult: Normal.  Obesity is not a contributing factor to the patient's stress incontinence.          Jada Flores MD  6/10/2021  10:31 CDT

## 2021-06-14 DIAGNOSIS — F41.9 ANXIETY: Chronic | ICD-10-CM

## 2021-06-14 DIAGNOSIS — F32.A DEPRESSION, UNSPECIFIED DEPRESSION TYPE: Chronic | ICD-10-CM

## 2021-06-15 RX ORDER — CITALOPRAM 20 MG/1
TABLET ORAL
Qty: 90 TABLET | Refills: 1 | Status: SHIPPED | OUTPATIENT
Start: 2021-06-15 | End: 2021-08-02 | Stop reason: SDUPTHER

## 2021-07-07 ENCOUNTER — OFFICE VISIT (OUTPATIENT)
Dept: OBSTETRICS AND GYNECOLOGY | Facility: CLINIC | Age: 76
End: 2021-07-07

## 2021-07-07 VITALS
DIASTOLIC BLOOD PRESSURE: 80 MMHG | HEIGHT: 64 IN | SYSTOLIC BLOOD PRESSURE: 122 MMHG | WEIGHT: 158 LBS | BODY MASS INDEX: 26.98 KG/M2

## 2021-07-07 DIAGNOSIS — N32.81 OAB (OVERACTIVE BLADDER): Primary | ICD-10-CM

## 2021-07-07 DIAGNOSIS — N39.3 SUI (STRESS URINARY INCONTINENCE, FEMALE): ICD-10-CM

## 2021-07-07 DIAGNOSIS — Z78.0 MENOPAUSE: ICD-10-CM

## 2021-07-07 PROCEDURE — 99214 OFFICE O/P EST MOD 30 MIN: CPT | Performed by: OBSTETRICS & GYNECOLOGY

## 2021-07-07 NOTE — PROGRESS NOTES
Subjective   Subjective     Chief Complaint   Patient presents with   • overactive bladder     pt here today for follow up on OAB and OSVALDO. pt wanting to discuss. pt voices no other concerns.        Nikki Santillan is a 76 y.o. year old who presents to be seen for urinary incontinence and frequency/nocturia.  She has been followed by Dr. Padilla and recently had urodynamic testing showing both OSVALDO and detrusor overactivity.  Patient and I have already discussed treatment options for both OAB and OSVALDO at great length.  She wanted to consider her options and then come back for another visit with her daughter (who is with her today).    The patient is not s/p hysterectomy.  Nikki's primary complaint is urinary frequency, which is worse during the night than during the day.  Patient reports she is up 2 or 3 times every night.  She does feel like bladder empties completley.  Patient does have some OSVALDO with sneezing, laughing, or coughing.  The patient has a chronic cough and reports that this occurs daily - she has to wear a pad all the time.  She is not currently sexually active.  Nikki Santillan has not had surgery for this problem in the past.    Patient had improvement with Oxybutynin but could not tolerate side effects.  She then tried two newer anticholinergics, and while she did not have dry eyes or constipation, they were not effective for OAB/frequency.  Patient drinks coffee and tea all day long, and puts artificial sweetener in both of them.  She does not drink alcohol.      Past Medical History:   Diagnosis Date   • Cancer (CMS/[facility])     breast cancer x 3 2003 - 2012 was last mastectomy   • Colon polyp    • Hyperlipidemia        Current Outpatient Medications:   •  albuterol sulfate  (90 Base) MCG/ACT inhaler, Inhale 2 puffs As Needed for Wheezing or Shortness of Air., Disp: 1 inhaler, Rfl: 3  •  cholecalciferol (VITAMIN D3) 1000 units tablet, Take 1,000 Units by mouth 2 (Two) Times a Day., Disp: , Rfl:   •   citalopram (CeleXA) 20 MG tablet, TAKE 1 TABLET BY MOUTH DAILY, Disp: 90 tablet, Rfl: 1  •  omeprazole (priLOSEC) 20 MG capsule, Take 20 mg by mouth Daily., Disp: , Rfl:   •  pravastatin (PRAVACHOL) 40 MG tablet, Take 1 tablet by mouth Every Night., Disp: 90 tablet, Rfl: 3  Family History   Problem Relation Age of Onset   • Breast cancer Mother    • Heart valve disorder Mother    • Brain cancer Father    • Hypertension Sister    • Hyperlipidemia Sister    • Breast cancer Sister    • Hyperlipidemia Sister    • Hypertension Sister    • Breast cancer Maternal Grandmother    • Colon cancer Neg Hx    • Colon polyps Neg Hx      Social History     Socioeconomic History   • Marital status:      Spouse name:    • Number of children: 2   • Years of education: 13   • Highest education level: Not on file   Tobacco Use   • Smoking status: Former Smoker     Packs/day: 0.50     Types: Cigarettes     Start date: 1962     Quit date: 1969     Years since quittin.5   • Smokeless tobacco: Never Used   Substance and Sexual Activity   • Alcohol use: No   • Drug use: No   • Sexual activity: Defer     Allergies   Allergen Reactions   • Bacitracin-Neomycin-Polymyxin Itching   • Dilaudid [Hydromorphone Hcl] Other (See Comments)     Oxygen stats dropped & mental altered status   • Promethazine Hcl Anxiety   • Ace Inhibitors Other (See Comments)     Family history of intolerance.   • Blephamide [Sulfacetamide-Prednisolone] Rash   • Neosporin [Neomycin-Bacitracin Zn-Polymyx] Rash   • Other Rash     Neodecadron eye drop     • Phenergan [Promethazine] Anxiety   • Sulfa Antibiotics Rash       Family History   Problem Relation Age of Onset   • Breast cancer Mother    • Heart valve disorder Mother    • Brain cancer Father    • Hypertension Sister    • Hyperlipidemia Sister    • Breast cancer Sister    • Hyperlipidemia Sister    • Hypertension Sister    • Breast cancer Maternal Grandmother    • Colon cancer Neg Hx    • Colon  "polyps Neg Hx      Review of Systems   Constitutional: Negative for activity change and unexpected weight change.   Respiratory: Negative for shortness of breath.    Cardiovascular: Negative for chest pain.   Gastrointestinal: Negative for constipation.   Genitourinary: Positive for enuresis.           Objective   /80   Ht 162.6 cm (64\")   Wt 71.7 kg (158 lb)   LMP  (LMP Unknown)   BMI 27.12 kg/m²     Physical Exam  Vitals and nursing note reviewed.   Constitutional:       General: She is not in acute distress.     Appearance: She is well-developed.   HENT:      Head: Normocephalic and atraumatic.   Neck:      Thyroid: No thyromegaly.   Pulmonary:      Effort: Pulmonary effort is normal.   Abdominal:      General: There is no distension.      Palpations: Abdomen is soft.      Tenderness: There is no abdominal tenderness.     Musculoskeletal:         General: Normal range of motion.      Cervical back: Normal range of motion.   Skin:     General: Skin is warm and dry.   Neurological:      Mental Status: She is alert and oriented to person, place, and time.   Psychiatric:         Behavior: Behavior normal.         Judgment: Judgment normal.         Imaging   No data reviewed       Assessment & Plan    Diagnoses and all orders for this visit:    1. OAB (overactive bladder) (Primary): Long discussion with patient/daughter explaining the difference between overactive bladder/urge incontinence and stress incontinence.  Patient is already tried multiple anticholinergic medications without success.  We have discussed caffeine, artificial sweeteners, citrus products, and alcohol as common bladder irritants.  I have encouraged the patient to cut out all of her artificial sweetener, which she puts in coffee and tea during the day.  We have also discussed InterStim as another treatment option; the patient is ready to schedule the temporary, 5-day version of the device which can be placed in the office as a " trial.    2. OSVALDO (stress urinary incontinence, female): I explained to the patient that stress incontinence requires a physical correction, and that no medication or stimulator would help with a weak sphincter muscle.  The surgical procedure of a mid urethral sling, and how that inhibits leakage was explained to the patient.  She has been strongly encouraged to try physical therapy since that is the only treatment modality that has been shown to be effective for both stress incontinence and urge incontinence, however the patient is not interested in trying physical therapy.  She wants to start by addressing detrusor overactivity, since that seems to be what gets her up at night.      3. Menopause    4. Non-smoker    5. BMI 26.0-26.9,adult: Normal.  Obesity is not a contributing factor to the patient's stress incontinence.       Electronically signed by Jada Flores MD, 07/07/21, 4:44 PM CDT.

## 2021-07-16 ENCOUNTER — OFFICE VISIT (OUTPATIENT)
Dept: FAMILY MEDICINE CLINIC | Facility: CLINIC | Age: 76
End: 2021-07-16

## 2021-07-16 VITALS
BODY MASS INDEX: 26.98 KG/M2 | DIASTOLIC BLOOD PRESSURE: 82 MMHG | TEMPERATURE: 97.8 F | WEIGHT: 158 LBS | HEIGHT: 64 IN | SYSTOLIC BLOOD PRESSURE: 136 MMHG | HEART RATE: 77 BPM | OXYGEN SATURATION: 98 % | RESPIRATION RATE: 16 BRPM

## 2021-07-16 DIAGNOSIS — R05.9 COUGH: ICD-10-CM

## 2021-07-16 DIAGNOSIS — R05.9 COUGH: Primary | ICD-10-CM

## 2021-07-16 PROCEDURE — 99213 OFFICE O/P EST LOW 20 MIN: CPT | Performed by: NURSE PRACTITIONER

## 2021-07-16 RX ORDER — METHYLPREDNISOLONE 4 MG/1
TABLET ORAL
Qty: 1 EACH | Refills: 0 | Status: SHIPPED | OUTPATIENT
Start: 2021-07-16 | End: 2021-08-02

## 2021-07-16 NOTE — PROGRESS NOTES
Please call the patient - No infiltrates - finish steroids as ordered.    Electronically signed by GIOVANNA Saenz, 07/16/21, 4:10 PM CDT.

## 2021-07-16 NOTE — PROGRESS NOTES
Subjective   Chief Complaint:  Cough    History of Present Illness:  This 76 y.o. female was seen in the office today.  She reports cough with slight shortness of breath, she was exposed to some 20 4D weed killer, she reports this was kind of a passive exposure a few weeks ago and has had these problems since.    Allergies   Allergen Reactions   • Bacitracin-Neomycin-Polymyxin Itching   • Dilaudid [Hydromorphone Hcl] Other (See Comments)     Oxygen stats dropped & mental altered status   • Promethazine Hcl Anxiety   • Ace Inhibitors Other (See Comments)     Family history of intolerance.   • Blephamide [Sulfacetamide-Prednisolone] Rash   • Neosporin [Neomycin-Bacitracin Zn-Polymyx] Rash   • Other Rash     Neodecadron eye drop     • Phenergan [Promethazine] Anxiety   • Sulfa Antibiotics Rash      Current Outpatient Medications on File Prior to Visit   Medication Sig   • albuterol sulfate  (90 Base) MCG/ACT inhaler Inhale 2 puffs As Needed for Wheezing or Shortness of Air.   • cholecalciferol (VITAMIN D3) 1000 units tablet Take 1,000 Units by mouth 2 (Two) Times a Day.   • citalopram (CeleXA) 20 MG tablet TAKE 1 TABLET BY MOUTH DAILY   • omeprazole (priLOSEC) 20 MG capsule Take 20 mg by mouth Daily.   • pravastatin (PRAVACHOL) 40 MG tablet Take 1 tablet by mouth Every Night.     No current facility-administered medications on file prior to visit.      Past Medical, Surgical, Social, and Family History:  Past Medical History:   Diagnosis Date   • Cancer (CMS/Formerly [facility])     breast cancer x 3 2003 - 2012 was last mastectomy   • Colon polyp    • Hyperlipidemia      Past Surgical History:   Procedure Laterality Date   • APPENDECTOMY     • COLONOSCOPY  12/16/2011   • COLONOSCOPY N/A 7/28/2017    Procedure: COLONOSCOPY WITH ANESTHESIA;  Surgeon: Chapin Valverde MD;  Location: [facility] ENDOSCOPY;  Service:    • COLONOSCOPY  2002   • DILATION AND CURETTAGE, DIAGNOSTIC / THERAPEUTIC     • MASTECTOMY      lumpectomy 2003 right  "and had xrt,  2004 right ,2012 left,    • TONSILLECTOMY       Social History     Socioeconomic History   • Marital status:      Spouse name:    • Number of children: 2   • Years of education: 13   • Highest education level: Not on file   Tobacco Use   • Smoking status: Former Smoker     Packs/day: 0.50     Types: Cigarettes     Start date: 1962     Quit date: 1969     Years since quittin.5   • Smokeless tobacco: Never Used   Substance and Sexual Activity   • Alcohol use: No   • Drug use: No   • Sexual activity: Defer     Family History   Problem Relation Age of Onset   • Breast cancer Mother    • Heart valve disorder Mother    • Brain cancer Father    • Hypertension Sister    • Hyperlipidemia Sister    • Breast cancer Sister    • Hyperlipidemia Sister    • Hypertension Sister    • Breast cancer Maternal Grandmother    • Colon cancer Neg Hx    • Colon polyps Neg Hx      Objective   Physical Exam  Vitals reviewed.   Constitutional:       General: She is not in acute distress.     Appearance: Normal appearance.      Comments: Voice slightly hoarse   Cardiovascular:      Rate and Rhythm: Normal rate and regular rhythm.   Pulmonary:      Effort: Pulmonary effort is normal. No respiratory distress.      Breath sounds: Normal breath sounds. No wheezing or rhonchi.      Comments: No diminishment in the bases, slight diminishing left bronchial area.    /82 (BP Location: Left arm)   Pulse 77   Temp 97.8 °F (36.6 °C)   Resp 16   Ht 162.6 cm (64\")   Wt 71.7 kg (158 lb)   LMP  (LMP Unknown)   SpO2 98%   BMI 27.12 kg/m²     Assessment/Plan   Diagnoses and all orders for this visit:    1. Cough (Primary)  -     methylPREDNISolone (MEDROL) 4 MG dose pack; Take as directed on package instructions.  Dispense: 1 each; Refill: 0  -     XR Chest PA & Lateral; Future    Discussion:  Advised and educated plan of care.  We will treat with steroids, reviewed material data sheet, reviewed poison " control information, this was a only a passive exposure, she does not report she was heavily inhaling this substance.  We will use steroids for airway inflammation, follow-up with a chest x-ray since there was a slight diminishing over the left bronchial fields.  This may be benign, I do need to rule out infiltrate or any other problem.    Follow-up:  Return for As Needed - Depending on Test Results - Will Call.    Electronically signed by GIOVANNA Saenz, 07/16/21, 10:44 AM CDT.

## 2021-07-20 ENCOUNTER — TELEPHONE (OUTPATIENT)
Dept: OBSTETRICS AND GYNECOLOGY | Facility: CLINIC | Age: 76
End: 2021-07-20

## 2021-07-20 NOTE — TELEPHONE ENCOUNTER
Called to get pt scheduled for interstim trial but says she has been sick and wants to put it in hold. Told her that when she is ready to give our office a call and we could get it rescheduled. Pt understood. BS

## 2021-08-02 ENCOUNTER — OFFICE VISIT (OUTPATIENT)
Dept: FAMILY MEDICINE CLINIC | Facility: CLINIC | Age: 76
End: 2021-08-02

## 2021-08-02 VITALS
BODY MASS INDEX: 27.49 KG/M2 | OXYGEN SATURATION: 95 % | DIASTOLIC BLOOD PRESSURE: 76 MMHG | SYSTOLIC BLOOD PRESSURE: 120 MMHG | HEART RATE: 82 BPM | TEMPERATURE: 97.7 F | HEIGHT: 64 IN | WEIGHT: 161 LBS | RESPIRATION RATE: 16 BRPM

## 2021-08-02 DIAGNOSIS — F32.A DEPRESSION, UNSPECIFIED DEPRESSION TYPE: Chronic | ICD-10-CM

## 2021-08-02 DIAGNOSIS — F41.9 ANXIETY: Chronic | ICD-10-CM

## 2021-08-02 DIAGNOSIS — R13.10 DYSPHAGIA, UNSPECIFIED TYPE: ICD-10-CM

## 2021-08-02 DIAGNOSIS — R49.0 HOARSENESS: ICD-10-CM

## 2021-08-02 DIAGNOSIS — E78.5 HYPERLIPIDEMIA, UNSPECIFIED HYPERLIPIDEMIA TYPE: Chronic | ICD-10-CM

## 2021-08-02 PROCEDURE — 99213 OFFICE O/P EST LOW 20 MIN: CPT | Performed by: FAMILY MEDICINE

## 2021-08-02 RX ORDER — PRAVASTATIN SODIUM 40 MG
40 TABLET ORAL NIGHTLY
Qty: 90 TABLET | Refills: 3 | Status: SHIPPED | OUTPATIENT
Start: 2021-08-02 | End: 2022-08-01

## 2021-08-02 RX ORDER — CITALOPRAM 20 MG/1
20 TABLET ORAL DAILY
Qty: 90 TABLET | Refills: 1 | Status: SHIPPED | OUTPATIENT
Start: 2021-08-02 | End: 2022-08-01

## 2021-08-02 NOTE — PROGRESS NOTES
Subjective   Nikki Santillan is a 76 y.o. female presenting with chief complaint of:   Chief Complaint   Patient presents with   • Cough     PT states she has had a cough and a feeling as if something is stuck in her throat since she accidently got weed spray in her face   • Sore Throat       History of Present Illness :  Alone.  Here for primarily an acute issue today; continued hoarsness and even mild problem swallowing on/off.  Started before visit with Jaylen 7.16.21; had brief exposure round up causing next morning hoarsness.  CXR neg; medrol from Jaylen did not help.       Has multiple chronic problems to consider that might have a bearing on today's issues; not an interval appointment.       Chronic/acute problems reviewed today:   1. Anxiety Chronic/stable:   On/off anxiety tolerated well.  Rx helps and not interested in Rx change. Stress ongoing day to day.      2. Depression, unspecified depression type chronic/past significant  mood swings, down moods, nervousness, difficulty with concentration to function home/work.  Others close have not been concerned.  No suicide ideation/intent.  Rx helps      3. Hyperlipidemia, unspecified hyperlipidemia type Chronic/stable.  Tolerated use of Rx with labs showing improved lipid values and tolerant liver labs. No muscle aches unexpected.      4. Dysphagia, unspecified type acute above   5. Hoarseness acute of     Has an/another acute issue today: none.    The following portions of the patient's history were reviewed and updated as appropriate: allergies, current medications, past family history, past medical history, past social history, past surgical history and problem list.      Current Outpatient Medications:   •  albuterol sulfate  (90 Base) MCG/ACT inhaler, Inhale 2 puffs As Needed for Wheezing or Shortness of Air., Disp: 1 inhaler, Rfl: 3  •  cholecalciferol (VITAMIN D3) 1000 units tablet, Take 1,000 Units by mouth 2 (Two) Times a Day., Disp: , Rfl:   •   citalopram (CeleXA) 20 MG tablet, Take 1 tablet by mouth Daily., Disp: 90 tablet, Rfl: 1  •  nystatin (MYCOSTATIN) 101020 UNIT/ML suspension, Swish and swallow 5 mL 4 (Four) Times a Day., Disp: 60 mL, Rfl: 1  •  omeprazole (priLOSEC) 20 MG capsule, Take 20 mg by mouth Daily., Disp: , Rfl:   •  pravastatin (PRAVACHOL) 40 MG tablet, Take 1 tablet by mouth Every Night., Disp: 90 tablet, Rfl: 3    No problems with medications.    Allergies   Allergen Reactions   • Bacitracin-Neomycin-Polymyxin Itching   • Dilaudid [Hydromorphone Hcl] Other (See Comments)     Oxygen stats dropped & mental altered status   • Promethazine Hcl Anxiety   • Ace Inhibitors Other (See Comments)     Family history of intolerance.   • Blephamide [Sulfacetamide-Prednisolone] Rash   • Neosporin [Neomycin-Bacitracin Zn-Polymyx] Rash   • Other Rash     Neodecadron eye drop     • Phenergan [Promethazine] Anxiety   • Sulfa Antibiotics Rash       Review of Systems   Constitutional: Positive for activity change. Negative for appetite change, chills, fatigue and fever.   HENT: Positive for trouble swallowing and voice change.    Eyes: Negative for visual disturbance.   Respiratory: Positive for cough. Negative for shortness of breath, wheezing and stridor.    Cardiovascular: Negative for chest pain, palpitations and leg swelling.   Gastrointestinal: Negative for abdominal pain.   Neurological: Negative for headaches.     Data reviewed:   Recent admit/ER/MD visits: [facility] visit    7.16.21 CXR [facility] negative      Lab Results:  Results for orders placed or performed in visit on 04/12/21   Comprehensive Metabolic Panel    Specimen: Blood   Result Value Ref Range    Glucose 105 (H) 65 - 99 mg/dL    BUN 14 8 - 23 mg/dL    Creatinine 0.64 0.57 - 1.00 mg/dL    eGFR Non African Am 90 >60 mL/min/1.73    eGFR African Am 109 >60 mL/min/1.73    BUN/Creatinine Ratio 21.9 7.0 - 25.0    Sodium 140 136 - 145 mmol/L    Potassium 3.8 3.5 - 5.2 mmol/L    Chloride 102 98 - 107  mmol/L    Total CO2 26.7 22.0 - 29.0 mmol/L    Calcium 10.0 8.6 - 10.5 mg/dL    Total Protein 6.6 6.0 - 8.5 g/dL    Albumin 4.60 3.50 - 5.20 g/dL    Globulin 2.0 gm/dL    A/G Ratio 2.3 g/dL    Total Bilirubin 0.4 0.0 - 1.2 mg/dL    Alkaline Phosphatase 60 39 - 117 U/L    AST (SGOT) 22 1 - 32 U/L    ALT (SGPT) 18 1 - 33 U/L   Hemoglobin A1c    Specimen: Blood   Result Value Ref Range    Hemoglobin A1C 5.90 (H) 4.80 - 5.60 %   Lipid Panel    Specimen: Blood   Result Value Ref Range    Total Cholesterol 191 0 - 200 mg/dL    Triglycerides 118 0 - 150 mg/dL    HDL Cholesterol 57 40 - 60 mg/dL    VLDL Cholesterol Neno 21 5 - 40 mg/dL    LDL Chol Calc (NIH) 113 (H) 0 - 100 mg/dL   TSH    Specimen: Blood   Result Value Ref Range    TSH 3.330 0.270 - 4.200 uIU/mL   Vitamin D 25 Hydroxy    Specimen: Blood   Result Value Ref Range    25 Hydroxy, Vitamin D 61.3 30.0 - 100.0 ng/ml   CBC & Differential    Specimen: Blood   Result Value Ref Range    WBC 4.97 3.40 - 10.80 10*3/mm3    RBC 5.02 3.77 - 5.28 10*6/mm3    Hemoglobin 14.3 12.0 - 15.9 g/dL    Hematocrit 43.1 34.0 - 46.6 %    MCV 85.9 79.0 - 97.0 fL    MCH 28.5 26.6 - 33.0 pg    MCHC 33.2 31.5 - 35.7 g/dL    RDW 13.1 12.3 - 15.4 %    Platelets 239 140 - 450 10*3/mm3    Neutrophil Rel % 58.1 42.7 - 76.0 %    Lymphocyte Rel % 29.0 19.6 - 45.3 %    Monocyte Rel % 8.5 5.0 - 12.0 %    Eosinophil Rel % 2.8 0.3 - 6.2 %    Basophil Rel % 1.0 0.0 - 1.5 %    Neutrophils Absolute 2.89 1.70 - 7.00 10*3/mm3    Lymphocytes Absolute 1.44 0.70 - 3.10 10*3/mm3    Monocytes Absolute 0.42 0.10 - 0.90 10*3/mm3    Eosinophils Absolute 0.14 0.00 - 0.40 10*3/mm3    Basophils Absolute 0.05 0.00 - 0.20 10*3/mm3    Immature Granulocyte Rel % 0.6 (H) 0.0 - 0.5 %    Immature Grans Absolute 0.03 0.00 - 0.05 10*3/mm3    nRBC 0.0 0.0 - 0.2 /100 WBC       A1C:  Lab Results - Last 18 Months   Lab Units 04/12/21  0707 10/06/20  0708 04/01/20  0715   HEMOGLOBIN A1C % 5.90* 5.70* 5.84*     LIPID:  Lab Results  "- Last 18 Months   Lab Units 04/12/21  0707 04/01/20  0715   CHOLESTEROL mg/dL 191 184   LDL CHOL mg/dL 113* 103*   HDL CHOL mg/dL 57 46   TRIGLYCERIDES mg/dL 118 173*     PSA:No results for input(s): PSA in the last 20543 hours.  CBC:  Lab Results - Last 18 Months   Lab Units 04/12/21  0707 10/06/20  0708 04/01/20  0715   WBC 10*3/mm3 4.97 5.64 5.12   HEMOGLOBIN g/dL 14.3 14.6 14.4   HEMATOCRIT % 43.1 42.6 43.3   PLATELETS 10*3/mm3 239 231 221      BMP/CMP:  Lab Results - Last 18 Months   Lab Units 04/12/21  0707 10/06/20  0708 04/01/20  0715   SODIUM mmol/L 140 139 139   POTASSIUM mmol/L 3.8 4.0 4.2   CHLORIDE mmol/L 102 99 100   TOTAL CO2 mmol/L 26.7 27.2 26.9   GLUCOSE mg/dL 105* 97 99   BUN mg/dL 14 9 10   CREATININE mg/dL 0.64 0.72 0.67   EGFR IF NONAFRICN AM mL/min/1.73 90 79 86   EGFR IF AFRICN AM mL/min/1.73 109 95 104   CALCIUM mg/dL 10.0 9.7 9.5     HEPATIC:  Lab Results - Last 18 Months   Lab Units 04/12/21  0707 04/01/20  0715   ALT (SGPT) U/L 18 17   AST (SGOT) U/L 22 22   ALK PHOS U/L 60 58     THYROID:  Lab Results - Last 18 Months   Lab Units 04/12/21  0707 04/01/20  0715   TSH uIU/mL 3.330 3.180       Objective   /76   Pulse 82   Temp 97.7 °F (36.5 °C) (Infrared)   Resp 16   Ht 162.6 cm (64\")   Wt 73 kg (161 lb)   LMP  (LMP Unknown)   SpO2 95%   BMI 27.64 kg/m²   Body mass index is 27.64 kg/m².    Recent Vitals       7/7/2021 7/16/2021 8/2/2021       BP:  122/80  136/82  120/76     Pulse:  --  77  82     Temp:  --  97.8 °F (36.6 °C)  97.7 °F (36.5 °C)     Weight:  71.7 kg (158 lb)  71.7 kg (158 lb)  73 kg (161 lb)     BMI (Calculated):  27.1  27.1  27.6           Physical Exam  Vitals reviewed.   Constitutional:       Appearance: Normal appearance.   HENT:      Head: Normocephalic.      Right Ear: Tympanic membrane, ear canal and external ear normal.      Left Ear: Tympanic membrane, ear canal and external ear normal.      Mouth/Throat:      Mouth: Mucous membranes are moist.      " Pharynx: Oropharynx is clear.      Comments: Very hoarse  Eyes:      Extraocular Movements: Extraocular movements intact.      Pupils: Pupils are equal, round, and reactive to light.   Neck:      Vascular: No carotid bruit.   Cardiovascular:      Rate and Rhythm: Normal rate and regular rhythm.      Heart sounds: Normal heart sounds.   Pulmonary:      Effort: Pulmonary effort is normal.      Breath sounds: Normal breath sounds.   Abdominal:      Palpations: Abdomen is soft.      Tenderness: There is no abdominal tenderness.   Musculoskeletal:      Cervical back: No rigidity or tenderness.   Lymphadenopathy:      Cervical: No cervical adenopathy.   Neurological:      Mental Status: She is alert.       Assessment/Plan     1. Anxiety    2. Depression, unspecified depression type    3. Hyperlipidemia, unspecified hyperlipidemia type    4. Dysphagia, unspecified type    5. Hoarseness      Discussion:  Salt water gargle  Talk infrequently  ENT: may need fiberoptic exam      Medical decision issues:   Data review above:   Rx: reviewed and decisions:   Same Rx for now otherwise    New Medications Ordered This Visit   Medications   • citalopram (CeleXA) 20 MG tablet     Sig: Take 1 tablet by mouth Daily.     Dispense:  90 tablet     Refill:  1   • pravastatin (PRAVACHOL) 40 MG tablet     Sig: Take 1 tablet by mouth Every Night.     Dispense:  90 tablet     Refill:  3       Orders placed:   LAB/Testing/Referrals: reviewed/orders:   Today:   Orders Placed This Encounter   Procedures   • Ambulatory Referral to ENT (Otolaryngology)     Chronic/recurrent labs above or change to:   same     Health maintenance:   Body mass index is 27.64 kg/m².  Patient's Body mass index is 27.64 kg/m². indicating that she is overweight (BMI 25-29.9). Obesity-related health conditions include the following: hypertension. Obesity is unchanged. BMI is is above average; BMI management plan is completed. We discussed portion control and increasing  exercise..    Tobacco use reviewed:    Nikki Santillan  reports that she quit smoking about 52 years ago. Her smoking use included cigarettes. She started smoking about 59 years ago. She smoked 0.50 packs per day. She has never used smokeless tobacco..       There are no Patient Instructions on file for this visit.    Follow up: Return for lab;, Dr Carrillo-, as planned;.  Future Appointments   Date Time Provider Department Center   10/11/2021  8:20 AM LAB [facility]   10/14/2021  9:00 AM Gael Carrillo MD [facility]

## 2021-09-14 ENCOUNTER — OFFICE VISIT (OUTPATIENT)
Dept: OTOLARYNGOLOGY | Facility: CLINIC | Age: 76
End: 2021-09-14

## 2021-09-14 VITALS — HEIGHT: 64 IN | BODY MASS INDEX: 27.31 KG/M2 | WEIGHT: 160 LBS

## 2021-09-14 DIAGNOSIS — R13.12 OROPHARYNGEAL DYSPHAGIA: Primary | ICD-10-CM

## 2021-09-14 DIAGNOSIS — K21.9 GASTROESOPHAGEAL REFLUX DISEASE WITHOUT ESOPHAGITIS: ICD-10-CM

## 2021-09-14 DIAGNOSIS — R05.9 COUGH: ICD-10-CM

## 2021-09-14 DIAGNOSIS — R49.0 DYSPHONIA PLICAE VENTRICULARIS: ICD-10-CM

## 2021-09-14 DIAGNOSIS — R09.89 GLOBUS PHARYNGEUS: ICD-10-CM

## 2021-09-14 PROCEDURE — 31575 DIAGNOSTIC LARYNGOSCOPY: CPT | Performed by: OTOLARYNGOLOGY

## 2021-09-14 PROCEDURE — 99204 OFFICE O/P NEW MOD 45 MIN: CPT | Performed by: OTOLARYNGOLOGY

## 2021-09-14 RX ORDER — PREDNISONE 10 MG/1
10 TABLET ORAL DAILY
COMMUNITY
End: 2021-10-19

## 2021-09-14 RX ORDER — BENZONATATE 100 MG/1
100 CAPSULE ORAL 2 TIMES DAILY PRN
COMMUNITY
End: 2021-09-23 | Stop reason: SDUPTHER

## 2021-09-14 NOTE — PROGRESS NOTES
[facility] Otolaryngology Head and Neck Surgery  CLINIC NOTE    Chief Complaint   Patient presents with   • Globus sensation   • Hoarse   • Cough          HPI   New Patient  Accompanied by:  No one  Nikki Santillan is a  76 y.o. female with vocal cord problems.  She was exposed to roundup 2 months ago. She says she inhaled. She develeoped cough, scabbing vocal cords and hoarseness. She coughs really hard and throws up. She has restarted symptoms. She has had this several times this year with therapy for this. Will see PCP and gets antibiotics and steroids. She will cough until almost throws up. She is losing voice.  She is unable to sing at [employer].  Reflux- she is on Omeprazole. Helps some.  No hemoptysis.  No pain with swallowing.  Seen in ER this past weekend. Placed on steroids for coughing, thorwing up, no fever. Throws up phlegm. Eating ice cream, will eventually throa up.  She is status post No surgery found on No surgery found.        History     Last Reviewed by Shine Renteria Jr., MD on 9/14/2021 at  9:54 AM    Sections Reviewed    Medical, Family, Tobacco, Surgical      Problem list reviewed by Shine Renteria Jr., MD on 9/14/2021 at  9:54 AM  Medicines reviewed by Shine Renteria Jr., MD on 9/14/2021 at  9:54 AM  Allergies reviewed by Shine Renteria Jr., MD on 9/14/2021 at  9:54 AM         Vital Signs:        Physical Exam  Vitals reviewed.   Constitutional:       Appearance: Normal appearance. She is well-developed and overweight.      Comments: Hoarse   HENT:      Head: Normocephalic and atraumatic.      Right Ear: Tympanic membrane and external ear normal.      Left Ear: Tympanic membrane and external ear normal.      Ears:      Comments: EACs too clean     Nose: Septal deviation (R to l mid mild, L to R low moderate) present.      Right Turbinates: Not enlarged or swollen.      Left Turbinates: Not enlarged or swollen.      Mouth/Throat:      Dentition:  Abnormal dentition (missing some upper teeth, good repair). No gum lesions.      Tongue: No lesions. Tongue does not deviate from midline.      Palate: No mass and lesions.      Pharynx: Oropharynx is clear. Uvula midline.      Tonsils: No tonsillar exudate or tonsillar abscesses.   Eyes:      General: Lids are normal. Gaze aligned appropriately.      Extraocular Movements: Extraocular movements intact.      Conjunctiva/sclera: Conjunctivae normal.      Comments: Color blue   Neck:      Thyroid: No thyroid mass or thyromegaly.      Vascular: No carotid bruit.      Trachea: Trachea normal.      Comments: VOICE:   hoarse-  mild    raspy-  mild    breathy-minimal    weak-  mild    pitch: Slightly lowered    sentence length: Normal    intensity: Slightly decreased  Pulmonary:      Effort: Pulmonary effort is normal. No respiratory distress.      Breath sounds: No stridor.   Musculoskeletal:         General: Normal range of motion.      Cervical back: Normal range of motion.   Lymphadenopathy:      Cervical: No cervical adenopathy.   Skin:     Findings: No rash.   Neurological:      General: No focal deficit present.      Mental Status: She is alert and oriented to person, place, and time.      Cranial Nerves: Cranial nerves are intact. No cranial nerve deficit.   Psychiatric:         Attention and Perception: Attention and perception normal.         Mood and Affect: Mood and affect normal.         Behavior: Behavior normal. Behavior is cooperative.         Thought Content: Thought content normal.         Cognition and Memory: Cognition and memory normal.         Judgment: Judgment normal.               Result Review       I have reviewed the patients old records in the chart.  The following results/records were reviewed:    XR Chest PA & Lateral (07/16/2021 00:00)   Office Visit with Jaylen Maria APRN (07/16/2021)   Referral to Otolaryngology for Dysphagia, unspecified type; Hoarseness (08/02/2021)              Assessment:        Diagnosis Plan   1. Oropharyngeal dysphagia     2. Globus pharyngeus     3. Cough  Ambulatory Referral to Speech Therapy   4. Dysphonia plicae ventricularis  Ambulatory Referral to Speech Therapy   5. Gastroesophageal reflux disease without esophagitis                Plan:        Conservative management.  Patient appears to have an initial insult to her vocal cords.  I suspect that she has strained her voice in the meantime to compensate for the initial irritation of the vocal cords.  I will send her voice repeat to see if voice rehabilitation will improve her intensity.  In the meantime, I will have her continue reflux precautions.  I will add oral antacids.  She should continue omeprazole.  She should stop steroids.  She can continue Tessalon Perles for her cough.  I will flex scope to reevaluate on next visit.  Voice hygiene  Reflux precautions  Antacids  Continue Omperazole  Stop Steroids  Continue Tessalon perles  Speech referral        Orders Placed This Encounter   Procedures   • Ambulatory Referral to Speech Therapy       MY CHART:  Patient is Encouraged to enroll in My Chart  Encouraged to review data and findings in My Chart    Patient understand(s) and agree(s) with the treatment plan as described.    Return in about 6 weeks (around 10/26/2021) for Recheck Voice, flex scope.            Shine Renteria Jr, MD  09/14/21  10:00 CDT

## 2021-09-14 NOTE — PATIENT INSTRUCTIONS
VOICE HYGIENE:    Many factors go into manufacturing what we call the voice. More goes on than just breathing out and using the voice box and mouth. Creating the voice requires good lung function, a healthy voice box, and complex muscle coordination. Any malfunction in any of the systems and voice problems can occur.    The most common problem seen in the office is hoarseness. Other types of voice problems can include breathiness, double voice, raspy voice, or simply a change in the pitch. Many things can cause a hoarse or weak voice, including laryngitis, vocal abuse (screaming, cheering, singing too loud for too long), smoking, etc. In order to treat the problem, your physician first must find the cause, then try to correct it to restore your normal voice.    The first part of a voice analysis is a detailed history of the problem, including any habits such as smoking, to delineate possible factors. The second step is a careful examination of the head and neck, including the voice box. The examination is essential, as this eliminates any anatomic problems with the vocal cords and the surrounding structures. More detailed analysis may be used in the form of videotaping the vocal cords with a stroboscopic light, thus providing additional information.     Suggestions for Voice Use and Conservation    Treatment of voice problems depends on the cause of the problems. Fortunately, most problems are easily correctable. The following are a few suggestions you will be asked to carry out, depending on the results of your examination.    >Avoid stress  >Avoid habitually clearing throat as this strains and fatigues the vocal cords  >Avoid yelling, especially for extended periods of time  >Avoid falsetto singing  >Avoid talking when it hurts or if your neck is excessively tired.  >Do exercise regularly to maintain fitness and breathe control. Do eat correctly, to avoid acid reflux  >Drink 6-8 glasses of water daily to keep the  vocal cords flexible, making it easier to speak  >Do take sips of water while speaking as swallowing helps relax the muscles of the throat and neck  >Do sip water, sniff or say the letter “H” forcefully rather than clearing throat  >Do listen to your voice as you speak to avoid trailing off or “tightness” at the end of sentences  >Do use plenty of breath while speaking. If you feel you are running out of breath, stop, breathe then continue.  >Do speak easily rather than pushing, forcing or straining  >Do stop talking and take a voice break, especially if you are fading or fatiguing  >Do increase your awareness of your voice. When does it sound better, worse? >Recording your voice or watching yourself speak in a mirror to watch for movement in the neck and shoulders can be beneficial.    Whispering is not a protective mechanism for the voice. In fact, whispering can make your condition worse. In order to limit further damage to your voice, maintain a normal volume and tone. Only in certain circumstances will this recommendation require modification. Dr. Renteria will tell you if that is the case.    Complete voice rest is used occasionally to treat voice and vocal cord problems. However, this is a rare situation. Dr. Renteria will tell you if this is indicated.    A great deal of voice hygiene is based on common sense. If it hurts to talk, then a problem exists.    Vocal Strengthening Exercises    >Sit in a comfortable and upright position. The chair should have a hard back and hard arm rests.  >Rest your hands on the arms of the chair. Push down with both of your hands at the same time. Do not use too much shoulder movement, simply push with your hands. You should feel a tightening of your abdominal muscles, not your neck muscles.  >After you become comfortable with the pushing motion, begin to make a short sound (such as at/ ah) each time you push down. You can substitute other short sounds such as a vowel sound or  "counting.  >Be sure to take a deep breath before each push down and vocalization of the sound.  Summary of exercise sequence:   Take a deep breath in  Push down while saying ah  Do NOT hold your breath  Relax and breathe out  Repeat above sequence 15-20 times every hour or several times each day.    Speech Pathology    Frequently, physicians and speech pathologists treat voice disorders together. These are highly trained individuals who specialize in therapy for the voice box and throat. You may recognize the better as speech, swallowing or voice therapists. Dr. Renteria may elect to refer you for evaluation and treatment, depending on your diagnosis and condition.    Special Testing    You may be referred certain tests to fully evaluate your throat, swallowing or voice. These may include CAT scans, swallowing tests, MRIs, or videostroboscopy. You may not be familiar these tests but the results will discussed them with you at the time of your office visit and answer your questions about them.      THE PROBLEM OF ACID REFLUX    In BOTH children and adults, irritating acids may leak from the stomach and come up into the esophagus and throat. This can occur at any time, but occurs more commonly when lying down. When the acid touches the lining of the esophagus, there is irritation and muscle spasm, which can be felt up into the throat. Usually this is felt as \"heartburn\". When scarring of the esophagus occurs, the esophagus becomes less sensitive and the symptoms may only be in the throat.     Some of the symptoms that can occur with acid reflux into the throat include coughing, soreness, burning, hoarseness, throat clearing, excessive mucous, bad taste in the mouth, bad breath, a sensation of a lump in the throat, wheezing, post-nasal drip, choking spells, bleeding and nausea. In a small percentage of people, more serious problems may result, such as pneumonia, ulcers in the larynx (voice box), reduced mobility of the " vocal cords and a pouch in the upper esophagus (diverticulum). In children, the symptoms may be different and include persistent vomiting, bleeding from the esophagus, respiratory problems, pneumonia, asthma, choking spells, swallowing problems and anemia. In some cases, unexplained fussiness and crying is due to reflux.     The following instructions are designed to help neutralize the stomach acid, reduce the production of the acid, promote emptying of the acid from the stomach into the intestines and prevent acid from coming up into the esophagus. You should adopt enough of these changes to alleviate your symptoms. If carrying out these suggestions produces no change, it is imperative that you return so that we can discuss further the problem. More testing may be required, as might medication. It is important to realize that the esophagus takes time to heal, so you should not expect results immediately.    Diet  Most often, the throat symptoms above are due to dietary abuses. Certain foods are known to cause irritation, because they are acids themselves or cause excess production of stomach acid. These should be avoided, if possible. The following is a partial list of foods recognized as troublesome: coffee (even decaffeinated), tea chocolate, cola beverages, citrus beverages (such as 7-Up), caffeine containing beverages, alcohol, citrus fruits and juices, highly spiced foods, fatty foods, candies, nuts, mints, milk and milk products. Some of the worst offenders for promoting stomach acid are milk and beer.     Hard candies, gum, breath fresheners, throat lozenges, cough drops, mouthwashes, gargles, may actually irritate the throat directly (many cough drops and lozenges contain irritants such as oil of eucalyptus and menthol), and can also stimulate acid production directly.     Large amounts of liquid in the diet tend to promote reflux, because liquids tend to come back up more easily than solids.     Meals  should be bland and consist of real food, trying to avoid recreational food. Multiple small meals, rather than one or two large meals helps prevent reflux by not stretching the stomach and increasing the time needed for digestion, as well increasing the amount of acid needed to digest the meal. If you are overweight, losing weight is tremendously helpful.     YOU SHOULD NOT EAT FOR AT LEAST TWO HOURS BEFORE RETIRING AND YOU SHOULD REMAIN SITTING OR STANDING FOR AT LEAST 45 MINUTES AFTER EACH MEAL. This promotes passage of food from the stomach into the intestine.    Posture  Body weight is a significant factor in promoting reflux. Losing weight is beneficial. Pregnancy will markedly increase the symptoms of heartburn and throat pain. You should avoid clothing that fits tightly across the mid-section, as this promotes squeezing of the abdominal contents upward. It is helpful to practice abdominal or diaphragmatic breathing, using the stomach to push out each breath rather than chest expansion. Avoid slumping while sitting, and avoid stooping or straining.     For many, the reflux occurs at night while sleeping. This is the time acid production is the greatest and you are lying down, a position that promotes reflux, thus setting up the irritation that occurs the next morning. One of the most important things you can do is to elevate the head of the bed, in an effort to use gravity to keep the acid in the stomach. This is accomplished by placed blocks or bricks beneath the legs at the head of the bed, raising the head 6-10 inches. Do not make the head so high that you slide down. Pillows are not effective because they cause the body to curl, increasing abdominal pressure and it is difficult to remain upright on them. Additionally, pillows promote sleeping on the back, but it is far more desirable to sleep on the right side or on the stomach, as this allows gas to escape, while preventing the escape of acid material.  Children and infants, who are refluxing, should sleep on their stomachs.  Exercise  Regular exercise is extremely beneficial in promoting good digestion and regularity. The abdominal muscles are toned, which aids in controlling acid problems. However, it is recommended that you not participate in vigorous activity immediately after eating. This causes pressure on an already full stomach, forcing acid up into the esophagus. Regular exercise is encouraged, prior to meals, or two hours after meals.  Antacids  Antacids should be used regularly, as they neutralize excess acid. Gelusil II, Mylanta II, Tums and Rolaids are popular brands. Gaviscon is not an antacid, but floats on top of the stomach acid, preventing esophageal irritation. Care should be used in administering large doses of antacids, especially in children. Many antacid preparations contain magnesium, which promotes frequent bowel movements, while antacids that contain aluminum can be constipating. Tums have a high calcium content that can be used to some advantage in older women with reflux.     Antacid tablets are convenient, but the liquid form tends to work much more quickly. Also remember to check with your physician about interference with other medications. Antacids can slow the absorption of digitalis, Indocin, tetracyclines, fluorides and isoniazid from the intestines. Many medications require the presence of stomach acid to be absorbed.     Initially, antacids should be used 30 minutes after each meal, 2 hours after each meal and at bedtime. This maximizes the neutralization of the stomach acid. Antacids can be used more frequently if symptoms persist, but such extensive use needs to be reviewed with your physician.  Prescription Medications  If the above recommendations are not effectively resolving the symptoms, medications may be prescribed. These are usually in the form of acid production blockers, such as Tagamet, Zantac, Pepcid, or Axid or  acid pump inhibitors like Prevacid, Nexium, Protonix or Prilosec. These drugs help stop acid production in the stomach. Medications such as Reglan can be used to promote stomach emptying.  Medications That Promote Reflux  Certain medications can promote acid reflux; either by relaxing the sphincter muscle that helps keeps stomach acid down, or increasing the acid production. These include progesterone (Provera, Ortho Novum, Ovral, other birth control pills), theophylline (Yves-Dur, etc.), anticholinergic (Donnatal, Scopolamine, Probanthine, Bentil, others), beta blockers (Inderal, Tenormin and Corgard), alpha blockers (Dibenzyline), dopamine, calcium channel blockers (Procardia, Cardizem, Isotin, Calan), aspirin, non-steroidal anti-inflammatory drugs (Motrin, Advil, Nuprin, Nalfon, Rufen, Indocin, Feldene, Clinoril and Tolectin). Vitamin C is an acid and can promote reflux. This is only a partial list and before stopping any prescription medication, you should check with your physician.    Goal  The goal is to reduce the symptoms with the least number of lifestyle changes. A combination of the above suggestions is frequently prescribed to return you to normal as quickly as possible. However, this problem did not develop overnight and will not disappear rapidly. Therefore, developing a regimen will aid in controlling symptoms and keep you symptom free for a long period of time. Other alternatives exist, should these suggestions fail, and can be discussed with your physician.     To Summarize:  Watch your diet, avoid foods that cause acid reflux  Lose weight  Avoid tight fitting clothes  Adjust your posture, raise the head of the bed  Exercise regularly  Use antacids  Use prescription medication as directed  Avoid medications that promote reflux  Avoid behavior and eating habits that promote reflux of stomach acid     You are welcome to do a Google search on the topic of reflux and reflux diets. The internet has many  useful resources.     Please remember, your success in controlling this condition depends on many factors including diet, exercise, medications and follow up. All are important and must be utilized to achieve success.     ANTACID USE:  Use antacids 30 mins after every meal, 2 hours after every meal and at Bedtime  Use Gaviscon Extra (best), Tums, Rolaids, etc  Over the counter  Use 2 tsp or 2 tabs as the dose  Remember that some of these products contain Calcium or Aluminum. If you have dietary or medical issues, please inform physician  Continue Omperazole    Voice therapy  Stop Prednisone  Continue Tessalon Perles      CONTACT INFORMATION:  The main office phone number is [phone]. For emergencies after hours and on weekends, this number will convert over to our answering service and the on call provider will answer. Please try to keep non emergent phone calls/ questions to office hours 9am-5pm Monday through Friday.     [employer]  As an alternative, you can sign up and use the Epic MyChart system for more direct and quicker access for non emergent questions/ problems.  [employer] allows you to send messages to your doctor, view your test results, renew your prescriptions, schedule appointments, and more. To sign up, go to [employer] and click on the Sign Up Now link in the New User? box. Enter your [employer] Activation Code exactly as it appears below along with the last four digits of your Social Security Number and your Date of Birth () to complete the sign-up process. If you do not sign up before the expiration date, you must request a new code.    [employer] Activation Code: Activation code not generated  Current [employer] Status: Patient Declined    If you have questions, you can email [email or web address] or call [phone] to talk to our [employer] staff. Remember, [employer] is NOT to be used for urgent needs. For medical emergencies, dial 911.

## 2021-09-14 NOTE — PROGRESS NOTES
[facility] Otolaryngology Head and Neck Surgery  PROCEDURE NOTE    Anesthesia:   topical 4% tetracaine and oxymetazoline mix    Endoscopy Type:   Flexible Laryngoscopy    Indications for Procedure:   1. Oropharyngeal dysphagia    2. Globus pharyngeus    3. Cough    4. Dysphonia plicae ventricularis    5. Gastroesophageal reflux disease without esophagitis         Procedure Details:    The patient was placed in an upright position.  The laryngoscope was passed left nasal passge.  The nasal cavities, nasopharynx, oropharynx, hypopharynx, and larynx were all examined.  Vocal cords were examined during respiration and phonation.  The following findings were noted:    Findings:   LARYNGOSCOPY FINDINGS :    NASOPHARYNX:     adenoids  Flat, no lesions, Eustachian tubes normal, without lesions, palate with normal mobility without lesions.  OROPHARYNX:     normal mucosa, without lesions, tonsils  Atrophic    Lateral walls:         Redundant          BILATERAL: Mild to moderate    Posterior walls:         normal, no lesions    BASEOF TONGUE:          prolapse  moderate    LINGUAL TONSILS:          BILATERAL: Mildly enlarged    VALLECULA:         normal, no lesions, no pooling  Bilateral  EPIGLOTTIS:    normal color, position, without lesions  HYPOPHARYNX:    Lateral walls:         BILATERAL: Mildly redundant    Posterior wall:        Mildly thickened, minimally red  ARYEPIGLOTTIC FOLDS:     normal mucosa, no lesions  ARYTENOIDS:    normal position, normal size, normal mobility, no lesions, tower size normal  FALSE CORDS:     normal mucosa, no lesions, normal mobility  Bilateral  TRUE VOCAL FOLDS:      normal appearance, mobility, no lesions  Bilateral  GLOTTIS:     normal closure, with good cord apposition  SUBGLOTTIS:     unobstructed, patent, no lesions    Condition:  Stable.  Patient tolerated procedure well.    Complications:  None    Post-procedure instructions reviewed with Patient  Instructions  documented in AVS for patient to review

## 2021-09-23 RX ORDER — BENZONATATE 100 MG/1
100 CAPSULE ORAL 3 TIMES DAILY PRN
Qty: 60 CAPSULE | Refills: 0 | Status: SHIPPED | OUTPATIENT
Start: 2021-09-23 | End: 2022-01-11

## 2021-09-29 ENCOUNTER — OFFICE VISIT (OUTPATIENT)
Dept: PHYSICAL THERAPY | Facility: CLINIC | Age: 76
End: 2021-09-29

## 2021-09-29 DIAGNOSIS — R49.0 DYSPHONIA: Primary | ICD-10-CM

## 2021-09-29 DIAGNOSIS — J38.3 VOCAL FOLD DYSFUNCTION: ICD-10-CM

## 2021-09-29 PROCEDURE — 92524 BEHAVRAL QUALIT ANALYS VOICE: CPT | Performed by: SPEECH-LANGUAGE PATHOLOGIST

## 2021-09-29 NOTE — PROGRESS NOTES
Speech/Language Pathology Initial Evaluation and Plan of Care    Patient: Nikki Santillan               : 1945  Visit Date: 2021  Referring practitioner: No ref. provider found  Date of Initial Visit: 2021  Patient seen for 1 sessions    Visit Diagnoses:    ICD-10-CM ICD-9-CM   1. Dysphonia  R49.0 784.42     Past Medical History:   Diagnosis Date   • Cancer (CMS/[facility])     breast cancer x 3  -  was last mastectomy   • Colon polyp    • Hyperlipidemia      Past Surgical History:   Procedure Laterality Date   • APPENDECTOMY     • COLONOSCOPY  2011   • COLONOSCOPY N/A 2017    Procedure: COLONOSCOPY WITH ANESTHESIA;  Surgeon: Chapin Valverde MD;  Location: [facility] ENDOSCOPY;  Service:    • COLONOSCOPY     • DILATION AND CURETTAGE, DIAGNOSTIC / THERAPEUTIC     • MASTECTOMY      lumpectomy  right and had xrt,  2004 right ,2012 left,    • TONSILLECTOMY         SUBJECTIVE     Subjective Patient was alert and fully able to participate in assessment today.   Patient presents with the following symptoms:   Date of Onset: Patient states it started years ago after a bout of pneumonia but exacerbated by exposure to [place] 3 months ago.  History of present problems: Hoarse voice, coughing to the point of throwing up, not being able to catch her breath, steroids and antibiotics not helping.   The functional impact on the patient: Patient has had to stop being  at her [employer] and cannot sing.     Pertinent Medical History Related to this Problem: Pneumonia, exposure to [place] herbicide, chronic cough      OBJECTIVE       General Voice History   Reflux History Patient reports significant reflux;Patient takes reflux medications;Reflux is reported to be not fully managed   Alcohol Servings None   Daily Water Intake 16oz/day water, skim milk with cereal   Daily Caffeine Intake 2 cups coffee in am   Tobacco History Smoked 8 years quit in    Pulmonary Status Other   (Possible  "COPD, not diagnosed)   Environmental Factors Exposure to chemicals;Exposure to allergens   Typical Voice Use Uses voice for ADL's;Other (comment)  (Was  at [employer] and had to quit due to voice)   Symptoms Improved/Worsened By Getting hot makes her cough   Vocal Abuse/Misuse Throat clearing, chronic cough   Allergies Dairy intolerance, medication allergies to dilauded and phenegren   Voice Assessment   S/Z Ratio and Interpretation 14.6/13.6 = 1.07 (<1.4 WNL)   Maximum Phonation Time and Interpretation 8 seconds on /a/   Pitch Glide Characteristics able to increase pitch with breathy voice   Pitch Range During Speech lower tone than before   Voice Onset Delayed   Crescendo/Decrescendo able to increase volume 5x with slight pitch increase   Voice Features Observed Low Tone Focus;Fast Fatigability;Hoarseness;Breathiness   Resonance Characteristics WFL   Other Voice Observations coughing on inspiration, inspiratory stridor   PVFM History   Medical History Related to Referral GERD;Anxiety;Frequent colds/sinus   History of ER Visits ED for coughing/throwing up recently   Reported Symptoms Characteristc of PVFM Runs out of air when speaking;Cannot coordinate breathing with speech;Nothing that has been tried has worked;Other (comment)  (Reflux medications helping some)   Other Reported Symptoms Chronic coughing;Noisy breathing on inhalation;Sudden onset of attacks;Patient uses inhalers, works \"sometimes\"   Inhalers Help/Do Not Help Inhalers help  (some)   Description of Coughing Episodes Patient coughs until she throws up phlegm   Voice Changes During Episodes hoarseness increases after coughing episodes   Triggers Chemicals;Heat;Other (comment)  (some smells and \"closeness\" )   PVFM Assessment   Breathing Episode Was observed   Breathing Episode Comment coughing on inspiration for deep breaths   Coughing Episode Was observed: brief, patient states often much worse   Muscle Tension Observation Neck (comment) "   Measures and Scales for Voice   Measures and Scales for Voice Voice Handicap Index;CAPE-V   Voice Handicap Index   Functional Subtest Score 7   Physical Subtest Score 22   Emotional Subtest Score 0   Total Score 29 - Mild/Moderate   CAPE-V   Overall Severity Moderately deviant   Roughness Mildly deviant   Breathiness Moderately deviant   Strain Mildly deviant   Pitch Mildly deviant;Moderately deviant   Loudness Mildly deviant   Instability Mildly deviant   Resonance WNL   Findings/Education/Recommendations   Clinical Impression- Voice Mild moderate voice disorder   Clinical Impression- PVFM Symptoms consistent with diagnosis of PVFM (comment)       IMPRESSION/RECOMMENDATIONS  Factors Affecting Performance: None  Learning Motivation: strong  Education/Learning Comments: Patient demonstrated understanding    Clinical Impression: Muscle tension dysphonia, globus, 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   Impact on Function: Unable to sing at [employer], difficulty with communication, chronic cough  Prognosis Comment: Good      Therapy Education/Self Care    Details: Evaluation and POC   Given home strategies - Rescue breathing   Progress: New   Education provided to:  Patient   Level of understanding Verbalized           Total Time of Visit:            65   mins    ASSESSMENT/PLAN     Goals                                         STG Comments Status   Patient will reduce hyperfunctional use of voice by performing Vocal Function Exercises     Pt will demonstrate an understanding of the structures and functions of the phonatory/respiratory tract (respiration, phonation, resonance and articulation)     Patient will reduce s/s of PVFM by demonstrating and using rescue breathing technique and breathing exercises.     LTG     Patient will utilize proprioceptive and auditory self-monitoring skills for adopting functional vocal behaviors in all vocational and avocational activities           ASSESSMENT:   Patient  is indicated for skilled care by a Speech/Language Pathologist.     Summary of Assessment: Muscle tension dysphonia and symptoms consistent with PVFM      PLAN:  Details of Plan of Care: Initiate therapy    Frequency: 1 time per week  Duration: 10 visits  Estimated Length of Session: 45 minutes      SPEECH/LANGUAGE PATHOLOGIST SIGNATURE: Jessica Daley CCC-SLP        Initial Certification  Certification Period: 12/28/2021  I certify that the therapy services are furnished while this patient is under my care.  The services outlined above are required by this patient, and will be reviewed every 90 days.     PHYSICIAN:     ______________________________________DATE: _________     Please sign and return via fax to [phone].   Thank you so much for letting us work with Nikki. I appreciate your letting us work with your patients. If you have any questions or concerns, please don't hesitate to contact me.

## 2021-10-05 ENCOUNTER — TREATMENT (OUTPATIENT)
Dept: PHYSICAL THERAPY | Facility: CLINIC | Age: 76
End: 2021-10-05

## 2021-10-05 DIAGNOSIS — J38.3 VOCAL FOLD DYSFUNCTION: ICD-10-CM

## 2021-10-05 DIAGNOSIS — R49.0 DYSPHONIA: Primary | ICD-10-CM

## 2021-10-05 PROCEDURE — 92507 TX SP LANG VOICE COMM INDIV: CPT | Performed by: SPEECH-LANGUAGE PATHOLOGIST

## 2021-10-05 NOTE — PROGRESS NOTES
Speech Language Pathology Treatment Note    Patient: Nikki Santillan                                                                                     Visit Date: 10/5/2021  :     1945    Referring practitioner:    Shine Renteria Jr*  Date of Initial Visit:          Type: THERAPY  Noted: 2021    Patient seen for 2 sessions    Visit Diagnoses:    ICD-10-CM ICD-9-CM   1. Dysphonia  R49.0 784.42   2. Vocal fold dysfunction  J38.3 478.5     SUBJECTIVE     SUBJECTIVE: Patient stated that she has been practicing her breathing and it has helped with her voice and coughing in [employer].     OBJECTIVE   GOALS  Goals                                         STG Comments Status   Patient will reduce hyperfunctional use of voice by performing Vocal Function Exercises  Introduced today: yawn/sigh, bubble/straw, diaphragm breathing  New   Pt will demonstrate an understanding of the structures and functions of the phonatory/respiratory tract (respiration, phonation, resonance and articulation)  Introduced. Patient demonstrated understanding.   New   Patient will reduce s/s of PVFM by demonstrating and using rescue breathing technique and breathing exercises.  Patient feels rescue breathing is helping, only coughed during last 5 min of [employer], reportedly. She had 3 coughing episodes during tx and was instructed on use of rescue breathing.   New   LTG       Patient will utilize proprioceptive and auditory self-monitoring skills for adopting functional vocal behaviors in all vocational and avocational activities  Goals introduced.   New         Therapy Education/Self Care    Details: Exercises and strategies   Given Home Exercise Program  home strategies   Progress: New   Education provided to:  Patient   Level of understanding Verbalized             Total Time of Visit:             45   mins         ASSESSMENT/PLAN     ASSESSMENT: Patient is using rescue breathing and feels it has helped. Vocal function exercises  "introduced and patient able to demonstrate.     PLAN: Continue therapy and home exercises. Patient to purchase \"Breather\" for exercises.     Progress Note Due Date: 10/28/21  Recertification Due Date: 12/28/21    Signature: Jessica Daley, CCC-SLP    "

## 2021-10-11 ENCOUNTER — LAB (OUTPATIENT)
Dept: FAMILY MEDICINE CLINIC | Facility: CLINIC | Age: 76
End: 2021-10-11

## 2021-10-11 DIAGNOSIS — E55.9 VITAMIN D DEFICIENCY: Primary | ICD-10-CM

## 2021-10-11 DIAGNOSIS — E78.2 MIXED HYPERLIPIDEMIA: ICD-10-CM

## 2021-10-11 DIAGNOSIS — Z01.89 LABORATORY TEST: ICD-10-CM

## 2021-10-11 DIAGNOSIS — R73.01 ELEVATED FASTING GLUCOSE: ICD-10-CM

## 2021-10-11 DIAGNOSIS — Z78.0 MENOPAUSE: ICD-10-CM

## 2021-10-12 ENCOUNTER — TREATMENT (OUTPATIENT)
Dept: PHYSICAL THERAPY | Facility: CLINIC | Age: 76
End: 2021-10-12

## 2021-10-12 DIAGNOSIS — J38.3 VOCAL FOLD DYSFUNCTION: ICD-10-CM

## 2021-10-12 DIAGNOSIS — R49.0 DYSPHONIA: Primary | ICD-10-CM

## 2021-10-12 PROCEDURE — 92507 TX SP LANG VOICE COMM INDIV: CPT | Performed by: SPEECH-LANGUAGE PATHOLOGIST

## 2021-10-12 NOTE — PROGRESS NOTES
"Speech Language Pathology Treatment Note    Patient: Nikki Santillan                                                                                     Visit Date: 10/12/2021  :     1945    Referring practitioner:    Shine Renteria Jr*  Date of Initial Visit:          Type: THERAPY  Noted: 2021    Patient seen for 3 sessions    Visit Diagnoses:    ICD-10-CM ICD-9-CM   1. Dysphonia  R49.0 784.42   2. Vocal fold dysfunction  J38.3 478.5     SUBJECTIVE     SUBJECTIVE: Patient stated that she has been practicing her breathing. She stated that she has been able to drink a small shake without coughing or throwing up.     OBJECTIVE   GOALS  Goals                                         STG Comments Status   Patient will reduce hyperfunctional use of voice by performing Vocal Function Exercises Patient is practicing yawn/sigh, bubble/straw. Able to demonstrate with minimal cues. More trouble with  diaphragm breathing, needed visual, verbal and tactile cuing. Patient brought \"breather\" and was instructed on inspiratory exercise.   Ongoing   Pt will demonstrate an understanding of the structures and functions of the phonatory/respiratory tract (respiration, phonation, resonance and articulation)  Educated on diaphragm structure and function of respiration during speech. Patient demonstrated understanding.   Ongoing   Patient will reduce s/s of PVFM by demonstrating and using rescue breathing technique and breathing exercises.  Patient feels rescue breathing is helping. She feels her coughing has greatly decreased and was able to drink a small shake without coughing.    Ongoing   LTG       Patient will utilize proprioceptive and auditory self-monitoring skills for adopting functional vocal behaviors in all vocational and avocational activities  Goals ongoing  Ongoing         Therapy Education/Self Care    Details: Exercises and strategies   Given Home Exercise Program  home strategies   Progress: New   Education " "provided to:  Patient   Level of understanding Verbalized             Total Time of Visit:             45   mins         ASSESSMENT/PLAN     ASSESSMENT: Patient is using rescue breathing and feels it has helped. Vocal function exercises reinforced and patient able to demonstrate.     PLAN: Continue therapy and home exercises. Added \"breather\" exercise 3 sets of 5-10 2x per day.     Progress Note Due Date: 10/28/21  Recertification Due Date: 12/28/21    Signature: Jessica Daley CCC-SLP  "

## 2021-10-19 ENCOUNTER — OFFICE VISIT (OUTPATIENT)
Dept: FAMILY MEDICINE CLINIC | Facility: CLINIC | Age: 76
End: 2021-10-19

## 2021-10-19 ENCOUNTER — TREATMENT (OUTPATIENT)
Dept: PHYSICAL THERAPY | Facility: CLINIC | Age: 76
End: 2021-10-19

## 2021-10-19 VITALS
SYSTOLIC BLOOD PRESSURE: 118 MMHG | WEIGHT: 158 LBS | RESPIRATION RATE: 18 BRPM | BODY MASS INDEX: 26.98 KG/M2 | TEMPERATURE: 97.8 F | HEIGHT: 64 IN | HEART RATE: 79 BPM | OXYGEN SATURATION: 98 % | DIASTOLIC BLOOD PRESSURE: 80 MMHG

## 2021-10-19 DIAGNOSIS — R73.01 ELEVATED FASTING GLUCOSE: ICD-10-CM

## 2021-10-19 DIAGNOSIS — Z23 IMMUNIZATION DUE: ICD-10-CM

## 2021-10-19 DIAGNOSIS — J38.3 VOCAL FOLD DYSFUNCTION: ICD-10-CM

## 2021-10-19 DIAGNOSIS — R49.0 DYSPHONIA: Primary | ICD-10-CM

## 2021-10-19 DIAGNOSIS — E78.2 MIXED HYPERLIPIDEMIA: Chronic | ICD-10-CM

## 2021-10-19 DIAGNOSIS — Z71.85 VACCINE COUNSELING: ICD-10-CM

## 2021-10-19 DIAGNOSIS — K21.9 GASTROESOPHAGEAL REFLUX DISEASE WITHOUT ESOPHAGITIS: Chronic | ICD-10-CM

## 2021-10-19 DIAGNOSIS — F32.A DEPRESSION, UNSPECIFIED DEPRESSION TYPE: Chronic | ICD-10-CM

## 2021-10-19 DIAGNOSIS — R05.3 CHRONIC COUGH: ICD-10-CM

## 2021-10-19 DIAGNOSIS — T23.101A SUPERFICIAL BURN OF RIGHT HAND, UNSPECIFIED SITE OF HAND, INITIAL ENCOUNTER: ICD-10-CM

## 2021-10-19 PROCEDURE — 1126F AMNT PAIN NOTED NONE PRSNT: CPT | Performed by: FAMILY MEDICINE

## 2021-10-19 PROCEDURE — 92507 TX SP LANG VOICE COMM INDIV: CPT | Performed by: SPEECH-LANGUAGE PATHOLOGIST

## 2021-10-19 PROCEDURE — 90471 IMMUNIZATION ADMIN: CPT | Performed by: FAMILY MEDICINE

## 2021-10-19 PROCEDURE — G0439 PPPS, SUBSEQ VISIT: HCPCS | Performed by: FAMILY MEDICINE

## 2021-10-19 PROCEDURE — 99214 OFFICE O/P EST MOD 30 MIN: CPT | Performed by: FAMILY MEDICINE

## 2021-10-19 PROCEDURE — 90662 IIV NO PRSV INCREASED AG IM: CPT | Performed by: FAMILY MEDICINE

## 2021-10-19 PROCEDURE — G0008 ADMIN INFLUENZA VIRUS VAC: HCPCS | Performed by: FAMILY MEDICINE

## 2021-10-19 PROCEDURE — 90715 TDAP VACCINE 7 YRS/> IM: CPT | Performed by: FAMILY MEDICINE

## 2021-10-19 PROCEDURE — 1160F RVW MEDS BY RX/DR IN RCRD: CPT | Performed by: FAMILY MEDICINE

## 2021-10-19 PROCEDURE — 1170F FXNL STATUS ASSESSED: CPT | Performed by: FAMILY MEDICINE

## 2021-10-19 RX ORDER — PREDNISONE 20 MG/1
TABLET ORAL
COMMUNITY
Start: 2021-09-13 | End: 2021-10-19

## 2021-10-19 NOTE — PROGRESS NOTES
Subjective   Nikki Santillan is a 76 y.o. female presenting with chief complaint of:   Chief Complaint   Patient presents with   • Medicare Wellness-subsequent       History of Present Illness :  Alone.       Has multiple chronic problems to consider that might have a bearing on today's issues;  an interval appointment.       Chronic/acute problems reviewed today:   1. Gastroesophageal reflux disease without esophagitis Chronic/stable.  Controlled heartburn, reflux without dysphagia, melena.  Rx used, periods not used proven needed with symptoms -currently doing ok.      2. Elevated fasting glucose-4.3.19 Chronic/stable. No problem/pattern hypoglycemia/hyperglycemia manifest by poly- dypsia, phagia, uria, or sweats, diaphoretic episodes, syncope/near.     3. Mixed hyperlipidemia Chronic/stable.  Tolerated use of Rx with labs showing improved lipid values and tolerant liver labs. No muscle aches unexpected.      4. Chronic cough chronic improved cough. ENT is limited to laryngeal tracheal reflux. Also has GE reflux. Working with speech.   5. Depression, unspecified depression type chronic past significant  mood swings, down moods, nervousness, difficulty with concentration to function home/work.  Others close have not been concerned.  No suicide ideation/intent.  Rx helps      6. 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 Advised to get a tetanus   7. Vaccine counseling Chronic/ongoing need to review pro/cons for various vaccinations based on age, health issues.  Needs vaccination today for: see below.     8.  Wellness: .Chronic ongoing over time screening or advice for general health care/wellness.        Has an/another acute: none    The following portions of the patient's history were reviewed and updated as appropriate:  allergies, current medications, past family history, past medical history, past social history, past surgical history and problem list.      Current Outpatient Medications:   •  albuterol sulfate  (90 Base) MCG/ACT inhaler, Inhale 2 puffs As Needed for Wheezing or Shortness of Air., Disp: 1 inhaler, Rfl: 3  •  benzonatate (Tessalon Perles) 100 MG capsule, Take 1 capsule by mouth 3 (Three) Times a Day As Needed for Cough., Disp: 60 capsule, Rfl: 0  •  cholecalciferol (VITAMIN D3) 1000 units tablet, Take 1,000 Units by mouth 2 (Two) Times a Day., Disp: , Rfl:   •  citalopram (CeleXA) 20 MG tablet, Take 1 tablet by mouth Daily., Disp: 90 tablet, Rfl: 1  •  omeprazole (priLOSEC) 20 MG capsule, Take 20 mg by mouth Daily., Disp: , Rfl:   •  pravastatin (PRAVACHOL) 40 MG tablet, Take 1 tablet by mouth Every Night., Disp: 90 tablet, Rfl: 3    No problems with medications.    Allergies   Allergen Reactions   • Bacitracin-Neomycin-Polymyxin Itching   • Dilaudid [Hydromorphone Hcl] Other (See Comments)     Oxygen stats dropped & mental altered status   • Promethazine Hcl Anxiety   • Ace Inhibitors Other (See Comments)     Family history of intolerance.   • Blephamide [Sulfacetamide-Prednisolone] Rash   • Neosporin [Neomycin-Bacitracin Zn-Polymyx] Rash   • Other Rash     Neodecadron eye drop     • Phenergan [Promethazine] Anxiety   • Sulfa Antibiotics Rash       Review of Systems  GENERAL:  Active/slower with limits, speed, stamina for age.  Sleep is ok; no orthopnea. No fever now.  SKIN: No rash/skin lesion of concern:   ENDO:  No syncope, near or diaphoretic sweaty spells.  HEENT: No recent head injury; or headache.   No vision change.  No significant hearing loss.  Ears without pain/drainage.  No sore throat.  Same usual/not that significant nasal/sinus congestion/drainage. No epistaxis.  CHEST: No chest wall tenderness or mass.  Same infrequent cough,  without wheeze.  No SOB; no hemoptysis.  CV: No exertional chest  pain, palpitations, ankle edema.  GI: No heartburn, dysphagia.  No abdominal pain, diarrhea, constipation.  No rectal bleeding, or melena.    :  Voids without dysuria, or incontinence to completion.  ORTHO: No painful/swollen joints but various on /off sore.  No sore neck or back.  No acute neck or back pain without recent injury.  NEURO: No dizziness, weakness of extremities.  No numbness/paresthesias.   PSYCH: No memory loss.  Mood good; not anxious, depressed but/and not suicidal.  Tries to tolerate stress .   Screening:  Gyne: [place]/confirmed 10.19.21  Mammogram: no TCC/epic: double masectomy  Bone density: 5.12.20/[facility]/bone density/LS -1.3 hip -0.7; doing very well; no change  Low dose CT chest: Tobacco-smoker/age 17/dc age 25 (<8p): NA  GI: Colon-polyp/Shieben/7.28.17/5y  Prostate: NA  Usual lab order  6m CBC  12m CBC, CMP, LIPID, TSH, Vit D,     Data reviewed:   Recent admit/ER/MD visits: ENT    Lab Results:  Results for orders placed or performed in visit on 10/11/21   Basic Metabolic Panel    Specimen: Blood   Result Value Ref Range    Glucose 99 65 - 99 mg/dL    BUN 9 8 - 23 mg/dL    Creatinine 0.70 0.57 - 1.00 mg/dL    eGFR Non African Am 81 >60 mL/min/1.73    eGFR African Am 98 >60 mL/min/1.73    BUN/Creatinine Ratio 12.9 7.0 - 25.0    Sodium 141 136 - 145 mmol/L    Potassium 4.4 3.5 - 5.2 mmol/L    Chloride 102 98 - 107 mmol/L    Total CO2 27.7 22.0 - 29.0 mmol/L    Calcium 9.7 8.6 - 10.5 mg/dL   Hemoglobin A1c    Specimen: Blood   Result Value Ref Range    Hemoglobin A1C 5.55 4.80 - 5.60 %   CBC & Differential    Specimen: Blood   Result Value Ref Range    WBC 5.20 3.40 - 10.80 10*3/mm3    RBC 5.16 3.77 - 5.28 10*6/mm3    Hemoglobin 14.2 12.0 - 15.9 g/dL    Hematocrit 45.5 34.0 - 46.6 %    MCV 88.2 79.0 - 97.0 fL    MCH 27.5 26.6 - 33.0 pg    MCHC 31.2 (L) 31.5 - 35.7 g/dL    RDW 13.6 12.3 - 15.4 %    Platelets 237 140 - 450 10*3/mm3    Neutrophil Rel % 58.8 42.7 - 76.0 %    Lymphocyte Rel % 28.3 19.6  "- 45.3 %    Monocyte Rel % 7.5 5.0 - 12.0 %    Eosinophil Rel % 3.8 0.3 - 6.2 %    Basophil Rel % 1.2 0.0 - 1.5 %    Neutrophils Absolute 3.06 1.70 - 7.00 10*3/mm3    Lymphocytes Absolute 1.47 0.70 - 3.10 10*3/mm3    Monocytes Absolute 0.39 0.10 - 0.90 10*3/mm3    Eosinophils Absolute 0.20 0.00 - 0.40 10*3/mm3    Basophils Absolute 0.06 0.00 - 0.20 10*3/mm3    Immature Granulocyte Rel % 0.4 0.0 - 0.5 %    Immature Grans Absolute 0.02 0.00 - 0.05 10*3/mm3    nRBC 0.0 0.0 - 0.2 /100 WBC       A1C:  Lab Results - Last 18 Months   Lab Units 10/11/21  0708 04/12/21  0707 10/06/20  0708   HEMOGLOBIN A1C % 5.55 5.90* 5.70*     LIPID:  Lab Results - Last 18 Months   Lab Units 04/12/21  0707   CHOLESTEROL mg/dL 191   LDL CHOL mg/dL 113*   HDL CHOL mg/dL 57   TRIGLYCERIDES mg/dL 118     PSA:No results for input(s): PSA in the last 20745 hours.  CBC:  Lab Results - Last 18 Months   Lab Units 10/11/21  0708 04/12/21  0707 10/06/20  0708   WBC 10*3/mm3 5.20 4.97 5.64   HEMOGLOBIN g/dL 14.2 14.3 14.6   HEMATOCRIT % 45.5 43.1 42.6   PLATELETS 10*3/mm3 237 239 231      BMP/CMP:  Lab Results - Last 18 Months   Lab Units 10/11/21  0708 04/12/21  0707 10/06/20  0708   SODIUM mmol/L 141 140 139   POTASSIUM mmol/L 4.4 3.8 4.0   CHLORIDE mmol/L 102 102 99   TOTAL CO2 mmol/L 27.7 26.7 27.2   GLUCOSE mg/dL 99 105* 97   BUN mg/dL 9 14 9   CREATININE mg/dL 0.70 0.64 0.72   EGFR IF NONAFRICN AM mL/min/1.73 81 90 79   EGFR IF AFRICN AM mL/min/1.73 98 109 95   CALCIUM mg/dL 9.7 10.0 9.7     HEPATIC:  Lab Results - Last 18 Months   Lab Units 04/12/21  0707   ALT (SGPT) U/L 18   AST (SGOT) U/L 22   ALK PHOS U/L 60     THYROID:  Lab Results - Last 18 Months   Lab Units 04/12/21  0707   TSH uIU/mL 3.330       Objective   /80   Pulse 79   Temp 97.8 °F (36.6 °C) (Infrared)   Resp 18   Ht 162.6 cm (64\")   Wt 71.7 kg (158 lb)   LMP  (LMP Unknown)   SpO2 98%   BMI 27.12 kg/m²   Body mass index is 27.12 kg/m².    Recent Vitals       " 8/2/2021 9/14/2021 10/19/2021       BP: 120/76 -- 118/80     Pulse: 82 -- 79     Temp: 97.7 °F (36.5 °C) -- 97.8 °F (36.6 °C)     Weight: 73 kg (161 lb) 72.6 kg (160 lb) 71.7 kg (158 lb)     BMI (Calculated): 27.6 27.5 27.1           Physical Exam  GENERAL:  Well nourished/developed in no acute distress.   SKIN: Turgor excellent, without wound, rash, lesion beyone PHOTO-R hand/wrist  HEENT: Normal cephalic without trauma.  Pupils equal round reactive to light. Extraocular motions full without nystagmus.   External canals nonobstructive nontender without reddness. Tymphatic membranes jenny with celia structures intact.   Oral cavity without growths, exudates, and moist.  Posterior pharynx without mass, obstruction, redness.  No thyromegaly, mass, tenderness, lymphadenopathy and supple.  CV: Regular rhythm.  No murmur, gallop,  edema. Posterior pulses intact.  No carotid bruits.  CHEST: No chest wall tenderness or mass.   LUNGS: Symmetric motion with clear to auscultation.    ABD: Soft, nontender without mass.   ORTHO: Symmetric extremities without swelling/point tenderness.  Full gross range of motion.  NEURO: CN 2-12 grossly intact.  Symmetric facies and UE/LE. 3/5 strength throughout.  Nonfocal use extremities. Speech mildly hoarse.   PSYCH: Oriented x 3.  Pleasant calm, well kept.  Purposeful/directed conservation with intact short/long gross memory.        Assessment/Plan     1. Gastroesophageal reflux disease without esophagitis    2. Elevated fasting glucose-4.3.19    3. Mixed hyperlipidemia    4. Chronic cough    5. Depression, unspecified depression type    6. Superficial burn of right hand, unspecified site of hand, initial encounter    7. Vaccine counseling    8. Immunization due      Discussion:  Ok clariton/like or even benadyrl  ? Reglan if forced      Wellness done   Vaccine reviewed: today tdap, flu; later covid booster (get apt-? Mix/match when time comes), shinglex.   Screening  "reviewed/updated    Medical decision issues:   Data review above:   Rx: reviewed and decisions:   Same Rx for now     Visit today involved chronic significant medical problems or differentials and/or intensive drug monitoring: ie potential to cause serious morbidity or death:   No orders of the defined types were placed in this encounter.      Orders placed:   LAB/Testing/Referrals: reviewed/orders:   Today:   Orders Placed This Encounter   Procedures   • Tdap Vaccine Greater Than or Equal To 6yo IM   • Fluzone High-Dose 65+yrs (2153-2156)     Chronic/recurrent labs above or change to:   same     Health maintenance:   Body mass index is 27.12 kg/m².  Patient's Body mass index is 27.12 kg/m². indicating that she is overweight (BMI 25-29.9). Obesity-related health conditions include the following: hypertension. Obesity is unchanged. BMI is is above average; BMI management plan is completed. We discussed portion control and increasing exercise..      Tobacco use reviewed:   Nikki Santillan  reports that she quit smoking about 52 years ago. Her smoking use included cigarettes. She started smoking about 59 years ago. She smoked 0.50 packs per day. She has never used smokeless tobacco..     Annual/wellness visit: also/today (see separate note)-medicare     Patient Instructions     Medicare/insurances offer certain visits called \"wellness/annual\" that allows for time to deal with and  review the many aspects of \"being well\" that just might not get mentioned during other visits with your doctor through the year.  This includes things like reviews of health screenings (mammograms, various labs),  weight, exercise, vaccines for just a few examples.      In order to help you with this we wish to make you aware of a few things for you to consider:    1. Advanced directives.  These are documents used to help direct your care if your health/situation should reach a point that you cannot make your own decisions.  While it is likely " you do not currently have a need for these documents now; it is something that we all might face at any time.   The hand outs you are being given today are simply for you to review and use to learn more about these documents and consider them as you wish.      2. Vaccines: Certain vaccines are important after age 50, 60, and 65 and some health situations (for example COPD), require even boosters beyond age 65.  We are happy to review with you your vaccine status and vaccines that might be needed for you at this point:      a. Tetanus.   Like anyone this needs to given every 10 years; sooner for/with lacerations/wounds.   Likely when getting this booster it needs to be a tetanus called Tdap (tetanus mixed with diptheria and pertussis).   Years ago you had this vaccine.  We now know we can lose our immunity to pertussis (a part of this vaccine) and run a risk of catching this.  Now only would this make us ill; but more importantly we can spread this to very young children (and for them it can be a much more dangerous illness).   We call this the grandparent vaccine for this reason.     b. Pneumonia (strept).   This comes now with two brands.   It is recommended to take pneumovax first; and a year later take the cousin prevnar.  Even if you have had these before; we need to review when and your current health situation/s as you may need boosters and even recently the CDC has made recent/new recommendations for pneumovax.      c. Shingles.  You do not want to catch shingles.  Though you will recover from this; the pain associated with shingles can be severe.  Even if you have had the now older zostavax, or have had shingles; it is recommended you still get the Shingrix (the new vaccine just available early 2018 shingles vaccine).  A new shingles vaccine (a shot to lower your chance of catching shingles) is now available (shingrix).  This vaccine is the second vaccine created for this purpose; (we have had zostavax for  years).  Shingrix provides a much better and longer immunity for shingles than zostavax.  For this and other reasons Shingrix can be started at age 50.  If you have had zostavax in the past; you can still take Shingrix.      This vaccine is not paid for in a doctor's office by medicare, medicaid and probably most insurances.  Like zostavax; this is covered in drug stores.  This is a vaccine that if you chose to get you need to get at a drug store that gives vaccines (like [facility], [facility] and [facility].      d. Yearly flu vaccine given from September through April each year (there is a special vaccine for those over 65).     e. Travel vaccines:  If you are one to do international travel; be sure and ask us for any particular unusual vaccines you may need.     f.  Miscellaneous:  If you have certain health situations/disease you may need specific/particular vaccines not give to the general public.     The vaccines we have on record for you include:   Immunization History   Administered Date(s) Administered   • COVID-19 ([facility]) 03/17/2021, 03/17/2021       If you have record of other vaccines and want them to show in your chart here; please talk to our nurses about having your vaccine record updated.     3. Exercise: regular cardio exercise something everyone should consider and try to do; even if health limitations (ie find that exercise UE/LE/cardio that they can tolerate).   Normal weight a goal for everyone (as we discussed)    4. Healthy diet helpful for weight management, illness prevention.     5. If over 50-screening exams include men PSA/rectal exam, women mammograms, and everyone colonoscopy screening for colon cancer.    6. If you use tobacco of any kind or e-products you should stop. We are providing you some information to consider that could make this process easier.      ##################################              Follow up: Return for lab/Dr Gerardo portillo.  Future Appointments    Date Time Provider Department [place]   10/25/2021 12:30 PM Jessica Daley CCC-SLP [facility]   10/26/2021  9:15 AM Shine Renteria Jr., MD [facility]   11/2/2021  1:15 PM Jessica Daley CCC-SLP [facility]   4/18/2022  8:25 AM LAB [facility]   4/20/2022  9:15 AM Gael Carrillo MD [facility]

## 2021-10-19 NOTE — PROGRESS NOTES
Pt here to be seen and discussed the benefits and risks of flu vaccine and tdap immunization, as pt is due. Copy of VIS given to pt and verbal and written consent given. Flu vaccine given in right deltoid site and tdap given in left deltoid site, tolerated well.

## 2021-10-19 NOTE — PATIENT INSTRUCTIONS
"Medicare/insurances offer certain visits called \"wellness/annual\" that allows for time to deal with and  review the many aspects of \"being well\" that just might not get mentioned during other visits with your doctor through the year.  This includes things like reviews of health screenings (mammograms, various labs),  weight, exercise, vaccines for just a few examples.      In order to help you with this we wish to make you aware of a few things for you to consider:    1. Advanced directives.  These are documents used to help direct your care if your health/situation should reach a point that you cannot make your own decisions.  While it is likely you do not currently have a need for these documents now; it is something that we all might face at any time.   The hand outs you are being given today are simply for you to review and use to learn more about these documents and consider them as you wish.      2. Vaccines: Certain vaccines are important after age 50, 60, and 65 and some health situations (for example COPD), require even boosters beyond age 65.  We are happy to review with you your vaccine status and vaccines that might be needed for you at this point:      a. Tetanus.   Like anyone this needs to given every 10 years; sooner for/with lacerations/wounds.   Likely when getting this booster it needs to be a tetanus called Tdap (tetanus mixed with diptheria and pertussis).   Years ago you had this vaccine.  We now know we can lose our immunity to pertussis (a part of this vaccine) and run a risk of catching this.  Now only would this make us ill; but more importantly we can spread this to very young children (and for them it can be a much more dangerous illness).   We call this the grandparent vaccine for this reason.     b. Pneumonia (strept).   This comes now with two brands.   It is recommended to take pneumovax first; and a year later take the cousin prevnar.  Even if you have had these before; we need to " review when and your current health situation/s as you may need boosters and even recently the CDC has made recent/new recommendations for pneumovax.      c. Shingles.  You do not want to catch shingles.  Though you will recover from this; the pain associated with shingles can be severe.  Even if you have had the now older zostavax, or have had shingles; it is recommended you still get the Shingrix (the new vaccine just available early 2018 shingles vaccine).  A new shingles vaccine (a shot to lower your chance of catching shingles) is now available (shingrix).  This vaccine is the second vaccine created for this purpose; (we have had zostavax for years).  Shingrix provides a much better and longer immunity for shingles than zostavax.  For this and other reasons Shingrix can be started at age 50.  If you have had zostavax in the past; you can still take Shingrix.      This vaccine is not paid for in a doctor's office by medicare, medicaid and probably most insurances.  Like zostavax; this is covered in drug stores.  This is a vaccine that if you chose to get you need to get at a drug store that gives vaccines (like [facility], [facility] and [facility].      d. Yearly flu vaccine given from September through April each year (there is a special vaccine for those over 65).     e. Travel vaccines:  If you are one to do international travel; be sure and ask us for any particular unusual vaccines you may need.     f.  Miscellaneous:  If you have certain health situations/disease you may need specific/particular vaccines not give to the general public.     The vaccines we have on record for you include:   Immunization History   Administered Date(s) Administered   • COVID-19 ([facility]) 03/17/2021, 03/17/2021       If you have record of other vaccines and want them to show in your chart here; please talk to our nurses about having your vaccine record updated.     3. Exercise: regular cardio exercise something  everyone should consider and try to do; even if health limitations (ie find that exercise UE/LE/cardio that they can tolerate).   Normal weight a goal for everyone (as we discussed)    4. Healthy diet helpful for weight management, illness prevention.     5. If over 50-screening exams include men PSA/rectal exam, women mammograms, and everyone colonoscopy screening for colon cancer.    6. If you use tobacco of any kind or e-products you should stop. We are providing you some information to consider that could make this process easier.      ##################################

## 2021-10-19 NOTE — PROGRESS NOTES
Speech Language Pathology Treatment Note    Patient: Nikki Santillan                                                                                     Visit Date: 10/19/2021  :     1945    Referring practitioner:    Shine Renteria Jr*  Date of Initial Visit:          Type: THERAPY  Noted: 2021    Patient seen for 4 sessions    Visit Diagnoses:    ICD-10-CM ICD-9-CM   1. Dysphonia  R49.0 784.42   2. Vocal fold dysfunction  J38.3 478.5     SUBJECTIVE     SUBJECTIVE: Patient stated that she has been practicing her breathing and exercises. She feels her breathing and voice are much better.     OBJECTIVE   GOALS  Goals                                         STG Comments Status   Patient will reduce hyperfunctional use of voice by performing Vocal Function Exercises Patient is practicing yawn/sigh, bubble/straw. Able to demonstrate with minimal cues. Diaphragmatic breathing is improving. She did not bring her breather but did state understanding.   Ongoing   Pt will demonstrate an understanding of the structures and functions of the phonatory/respiratory tract (respiration, phonation, resonance and articulation) Reviewed, patient demonstrated understanding.   Ongoing   Patient will reduce s/s of PVFM by demonstrating and using rescue breathing technique and breathing exercises.  Patient feels her coughing has greatly decreased stated that she is not coughing much at all during the day, she continues to cough at night and last Thursday started coughing and threw up in bed without warning.     Ongoing   LTG       Patient will utilize proprioceptive and auditory self-monitoring skills for adopting functional vocal behaviors in all vocational and avocational activities  Goals ongoing  Ongoing         Therapy Education/Self Care    Details: Exercises and strategies   Given Home Exercise Program  home strategies   Progress: New   Education provided to:  Patient   Level of understanding Verbalized              Total Time of Visit:             45   mins         ASSESSMENT/PLAN     ASSESSMENT: Patient is using rescue breathing and feels it has helped. She feels her voice is better. Vocal function exercises reinforced and patient able to demonstrate use and understanding.     PLAN: Continue therapy and home exercises.     Progress Note Due Date: 10/28/21  Recertification Due Date: 12/28/21    Signature: Jessica Daley CCC-SLP

## 2021-10-20 NOTE — PROGRESS NOTES
QUICK REFERENCE INFORMATION:  The ABCs of the Annual Wellness Visit    Subsequent Medicare Wellness Visit     HEALTH RISK ASSESSMENT    : 1945    Recent Hospitalizations:  No hospitalization(s) within the last year..  ccc      Current Medical Providers:  Patient Care Team:  Gael Carrillo MD as PCP - General (Family Medicine)        Smoking Status:  Social History     Tobacco Use   Smoking Status Former Smoker   • Packs/day: 0.50   • Types: Cigarettes   • Start date: 1962   • Quit date: 1969   • Years since quittin.8   Smokeless Tobacco Never Used       Alcohol Consumption:  Social History     Substance and Sexual Activity   Alcohol Use No       Depression Screen:   PHQ-2/PHQ-9 Depression Screening 10/19/2021   Little interest or pleasure in doing things 0   Feeling down, depressed, or hopeless 0   Trouble falling or staying asleep, or sleeping too much 0   Feeling tired or having little energy 0   Poor appetite or overeating 0   Feeling bad about yourself - or that you are a failure or have let yourself or your family down 0   Trouble concentrating on things, such as reading the newspaper or watching television 0   Moving or speaking so slowly that other people could have noticed. Or the opposite - being so fidgety or restless that you have been moving around a lot more than usual 0   Thoughts that you would be better off dead, or of hurting yourself in some way 0   Total Score 0   If you checked off any problems, how difficult have these problems made it for you to do your work, take care of things at home, or get along with other people? Not difficult at all       Health Habits and Functional and Cognitive Screening:  Functional & Cognitive Status 10/19/2021   Do you have difficulty preparing food and eating? No   Do you have difficulty bathing yourself, getting dressed or grooming yourself? No   Do you have difficulty using the toilet? No   Do you have difficulty moving around from  place to place? No   Do you have trouble with steps or getting out of a bed or a chair? No   Current Diet Unhealthy Diet   Dental Exam Not up to date   Eye Exam Up to date   Exercise (times per week) 0 times per week   Current Exercises Include No Regular Exercise   Do you need help using the phone?  No   Are you deaf or do you have serious difficulty hearing?  No   Do you need help with transportation? No   Do you need help shopping? No   Do you need help preparing meals?  No   Do you need help with housework?  No   Do you need help with laundry? No   Do you need help taking your medications? No   Do you need help managing money? No   Do you ever drive or ride in a car without wearing a seat belt? No   Have you felt unusual stress, anger or loneliness in the last month? No   Who do you live with? Alone   If you need help, do you have trouble finding someone available to you? No   Have you been bothered in the last four weeks by sexual problems? No   Do you have difficulty concentrating, remembering or making decisions? No           Does the patient have evidence of cognitive impairment? No    Asiprin use counseling: Does not need ASA (and currently is not on it)      Recent Lab Results:    Lab Results   Component Value Date    GLU 99 10/11/2021     Lab Results   Component Value Date    HGBA1C 5.55 10/11/2021     Lab Results   Component Value Date    TRIG 118 04/12/2021    HDL 57 04/12/2021    VLDL 21 04/12/2021           Age-appropriate Screening Schedule:  Refer to the list below for future screening recommendations based on patient's age, sex and/or medical conditions. Orders for these recommended tests are listed in the plan section. The patient has been provided with a written plan.    Health Maintenance   Topic Date Due   • ZOSTER VACCINE (1 of 2) 10/28/2022 (Originally 4/7/1994)   • LIPID PANEL  04/12/2022   • DXA SCAN  05/12/2022   • TDAP/TD VACCINES (2 - Td or Tdap) 10/19/2031   • INFLUENZA VACCINE   Completed   • MAMMOGRAM  Discontinued        Subjective   History of Present Illness    Nikki Santillan is a 76 y.o. female who presents for an Annual Wellness Visit.    The following portions of the patient's history were reviewed and updated as appropriate: allergies, current medications, past family history, past medical history, past social history, past surgical history and problem list.    Outpatient Medications Prior to Visit   Medication Sig Dispense Refill   • albuterol sulfate  (90 Base) MCG/ACT inhaler Inhale 2 puffs As Needed for Wheezing or Shortness of Air. 1 inhaler 3   • benzonatate (Tessalon Perles) 100 MG capsule Take 1 capsule by mouth 3 (Three) Times a Day As Needed for Cough. 60 capsule 0   • cholecalciferol (VITAMIN D3) 1000 units tablet Take 1,000 Units by mouth 2 (Two) Times a Day.     • citalopram (CeleXA) 20 MG tablet Take 1 tablet by mouth Daily. 90 tablet 1   • omeprazole (priLOSEC) 20 MG capsule Take 20 mg by mouth Daily.     • pravastatin (PRAVACHOL) 40 MG tablet Take 1 tablet by mouth Every Night. 90 tablet 3   • nystatin (MYCOSTATIN) 119420 UNIT/ML suspension Swish and swallow 5 mL 4 (Four) Times a Day. 60 mL 1   • predniSONE (DELTASONE) 10 MG tablet Take 10 mg by mouth Daily. X 5 days     • predniSONE (DELTASONE) 20 MG tablet TAKE 1 TABLET BY MOUTH DAILY WITH MEALS       No facility-administered medications prior to visit.       Patient Active Problem List   Diagnosis   • Abnormal x-ray- chest CT-resolved   • Gastroesophageal reflux disease   • Chronic back pain   • Osteopenia bd 2020- ?2y   • Menopause   • Hyperlipidemia-statin   • Wellness examination-done   • Anxiety   • Depression   • Hx of colonic polyp   • Vitamin D deficiency   • Laboratory test   • Status post mastectomy, bilateral   • History of breast cancer   • Elevated fasting glucose-4.3.19   • Chronic cough-reflux       Advance Care Planning:  ACP discussion was held with the patient during this visit. Patient has an  advance directive (not in EMR), copy requested.    Identification of Risk Factors:  Risk factors include: Advance Directive Discussion  Breast Cancer/Mammogram Screening  Colon Cancer Screening  Immunizations Discussed/Encouraged (specific immunizations; Tdap, Influenza, Pneumococcal 23, Shingrix and COVID19 )  Osteoporosis Risk.    Review of Systems  GENERAL:  Active/slower with limits, speed, stamina for age.  Sleep is ok; no orthopnea. No fever now.  SKIN: No rash/skin lesion of concern:   ENDO:  No syncope, near or diaphoretic sweaty spells.  HEENT: No recent head injury; or headache.   No vision change.  No significant hearing loss.  Ears without pain/drainage.  No sore throat.  Same usual/not that significant nasal/sinus congestion/drainage. No epistaxis.  CHEST: No chest wall tenderness or mass.  Same infrequent cough,  without wheeze.  No SOB; no hemoptysis.  CV: No exertional chest pain, palpitations, ankle edema.  GI: No heartburn, dysphagia.  No abdominal pain, diarrhea, constipation.  No rectal bleeding, or melena.    :  Voids without dysuria, or incontinence to completion.  ORTHO: No painful/swollen joints but various on /off sore.  No sore neck or back.  No acute neck or back pain without recent injury.  NEURO: No dizziness, weakness of extremities.  No numbness/paresthesias.   PSYCH: No memory loss.  Mood good; not anxious, depressed but/and not suicidal.  Tries to tolerate stress .   Screening:  Gyne: Kwadwo confirmed 10.19.21  Mammogram: no TCC/epic: double masectomy  Bone density: 5.12.20/[facility]/bone density/LS -1.3 hip -0.7; doing very well; no change  Low dose CT chest: Tobacco-smoker/age 17/dc age 25 (<8p): NA  GI: Colon-polyp/Shieben/7.28.17/5y  Prostate: NA  Usual lab order  6m CBC  12m CBC, CMP, LIPID, TSH, Vit D,    Compared to one year ago, the patient feels her physical health is the same.  Compared to one year ago, the patient feels her mental health is the same.    Objective     Physical  "Exam    Vitals:    10/19/21 1059   BP: 118/80   Pulse: 79   Resp: 18   Temp: 97.8 °F (36.6 °C)   TempSrc: Infrared   SpO2: 98%   Weight: 71.7 kg (158 lb)   Height: 162.6 cm (64\")   PainSc: 0-No pain       Patient's Body mass index is 27.12 kg/m². indicating that she is overweight (BMI 25-29.9). Obesity-related health conditions include the following: GERD. Obesity is unchanged. BMI is is above average; BMI management plan is completed. We discussed portion control and increasing exercise..      Assessment/Plan   Patient Self-Management and Personalized Health Advice  The patient has been provided with information about: diet, exercise and weight management and preventive services including:   · Annual Wellness Visit (AWV).    Visit Diagnoses:    ICD-10-CM ICD-9-CM   1. Gastroesophageal reflux disease without esophagitis  K21.9 530.81   2. Elevated fasting glucose-4.3.19  R73.01 790.21   3. Mixed hyperlipidemia  E78.2 272.2   4. Chronic cough  R05.3 786.2   5. Depression, unspecified depression type  F32.A 311   6. Superficial burn of right hand, unspecified site of hand, initial encounter  T23.101A 944.10   7. Vaccine counseling  Z71.85 V65.49   8. Immunization due  Z23 V05.9       Orders Placed This Encounter   Procedures   • Tdap Vaccine Greater Than or Equal To 8yo IM   • Fluzone High-Dose 65+yrs (0119-2531)       Outpatient Encounter Medications as of 10/19/2021   Medication Sig Dispense Refill   • albuterol sulfate  (90 Base) MCG/ACT inhaler Inhale 2 puffs As Needed for Wheezing or Shortness of Air. 1 inhaler 3   • benzonatate (Tessalon Perles) 100 MG capsule Take 1 capsule by mouth 3 (Three) Times a Day As Needed for Cough. 60 capsule 0   • cholecalciferol (VITAMIN D3) 1000 units tablet Take 1,000 Units by mouth 2 (Two) Times a Day.     • citalopram (CeleXA) 20 MG tablet Take 1 tablet by mouth Daily. 90 tablet 1   • omeprazole (priLOSEC) 20 MG capsule Take 20 mg by mouth Daily.     • pravastatin " (PRAVACHOL) 40 MG tablet Take 1 tablet by mouth Every Night. 90 tablet 3   • [DISCONTINUED] nystatin (MYCOSTATIN) 257342 UNIT/ML suspension Swish and swallow 5 mL 4 (Four) Times a Day. 60 mL 1   • [DISCONTINUED] predniSONE (DELTASONE) 10 MG tablet Take 10 mg by mouth Daily. X 5 days     • [DISCONTINUED] predniSONE (DELTASONE) 20 MG tablet TAKE 1 TABLET BY MOUTH DAILY WITH MEALS       No facility-administered encounter medications on file as of 10/19/2021.       Reviewed use of high risk medication in the elderly: yes  Reviewed for potential of harmful drug interactions in the elderly: yes    Follow Up:  Return for lab/Dr Gerardo portillo.     An After Visit Summary and PPPS with all of these plans were given to the patient.

## 2021-10-25 ENCOUNTER — TREATMENT (OUTPATIENT)
Dept: PHYSICAL THERAPY | Facility: CLINIC | Age: 76
End: 2021-10-25

## 2021-10-25 DIAGNOSIS — R49.0 DYSPHONIA: Primary | ICD-10-CM

## 2021-10-25 DIAGNOSIS — J38.3 VOCAL FOLD DYSFUNCTION: ICD-10-CM

## 2021-10-25 PROCEDURE — 92507 TX SP LANG VOICE COMM INDIV: CPT | Performed by: SPEECH-LANGUAGE PATHOLOGIST

## 2021-10-25 NOTE — PROGRESS NOTES
Speech Language Pathology Treatment Note and Discharge Note    Patient: Nikki Santillan                                                                                     Visit Date: 10/25/2021  :     1945    Referring practitioner:    Shine Renteria Jr*  Date of Initial Visit:          Type: THERAPY  Noted: 2021    Patient seen for 5 sessions    Visit Diagnoses:    ICD-10-CM ICD-9-CM   1. Dysphonia  R49.0 784.42   2. Vocal fold dysfunction  J38.3 478.5     SUBJECTIVE     Patient stated that she is able to speak and sing at [employer] with a lapel isamar with no problems. She agreed to discharge from therapy and continue home exercises and strategies.     OBJECTIVE   GOALS  Goals                                         STG Comments Status   Patient will reduce hyperfunctional use of voice by performing Vocal Function Exercises  Able to demonstrate exercises with out cues.  Diaphragmatic breathing is improving. She brought her breather today and was able to demonstrate with minimal cues. Has increased resistance to #4 inhale with no coughing. Met   Pt will demonstrate an understanding of the structures and functions of the phonatory/respiratory tract (respiration, phonation, resonance and articulation) Reviewed, patient demonstrated understanding.   Met   Patient will reduce s/s of PVFM by demonstrating and using rescue breathing technique and breathing exercises.  Patient feels her coughing has greatly decreased. She is able to speak and sing in [employer] using a lapel isamar with no coughing or voice problem. She is now able to have a strawberry shake from [employer] (ice milk) without coughing.   Met   LTG       Patient will utilize proprioceptive and auditory self-monitoring skills for adopting functional vocal behaviors in all vocational and avocational activities    Able to demonstrate without cues.   Met         Therapy Education/Self Care    Details: Exercises and strategies   Given Home Exercise  Program  home strategies   Progress: Progressed   Education provided to:  Patient   Level of understanding Verbalized             Total Time of Visit:             40   mins         ASSESSMENT/PLAN     ASSESSMENT: Patient  feels her voice is better. She is not coughing and is able to manage with breathing exercises.  Patient able to demonstrate vocal function exercises independently.   PLAN: Discharge and continue home exercises, continue to manage reflux and follow vocal hygiene practices.      DISCHARGE SUMMARY   Discharge date 10/25/2021   Dates of this episode 9/29/21 through Today   Number of visits on this episode 5   Reason for discharge all goals met   Outcomes achieved Refer to the goals table for specifics on goals  Able to achieve all goals   Discharge plan Continue with current home exercise program as instructed   Summary of care Patient has completed a course of treatment for voice/PVFM. She has demonstrated marked improvement in her voice and reduction of coughing. Able to demonstrate all exercises and strategies independently.    Discharge instruction Continue home exercises/strategies and follow up with MD as needed.       Thank you for this referral and for allowing us to participate in the care of this patient. She is certainly welcome to return for therapy should conditions warrant.     Signature: Jessica Daley, CCC-SLP

## 2021-10-26 ENCOUNTER — OFFICE VISIT (OUTPATIENT)
Dept: OTOLARYNGOLOGY | Facility: CLINIC | Age: 76
End: 2021-10-26

## 2021-10-26 VITALS — HEART RATE: 71 BPM | HEIGHT: 64 IN | BODY MASS INDEX: 27.45 KG/M2 | WEIGHT: 160.8 LBS

## 2021-10-26 DIAGNOSIS — R13.12 OROPHARYNGEAL DYSPHAGIA: Primary | ICD-10-CM

## 2021-10-26 DIAGNOSIS — R05.9 COUGH: ICD-10-CM

## 2021-10-26 DIAGNOSIS — R09.89 GLOBUS PHARYNGEUS: ICD-10-CM

## 2021-10-26 DIAGNOSIS — R49.0 DYSPHONIA PLICAE VENTRICULARIS: ICD-10-CM

## 2021-10-26 DIAGNOSIS — K21.9 GASTROESOPHAGEAL REFLUX DISEASE WITHOUT ESOPHAGITIS: ICD-10-CM

## 2021-10-26 DIAGNOSIS — J35.1 LINGUAL TONSIL HYPERTROPHY: ICD-10-CM

## 2021-10-26 PROCEDURE — 31575 DIAGNOSTIC LARYNGOSCOPY: CPT | Performed by: OTOLARYNGOLOGY

## 2021-10-26 PROCEDURE — 99213 OFFICE O/P EST LOW 20 MIN: CPT | Performed by: OTOLARYNGOLOGY

## 2021-10-26 NOTE — PROGRESS NOTES
[facility] Otolaryngology Head and Neck Surgery  PROCEDURE NOTE    Anesthesia:   topical 4% tetracaine and oxymetazoline mix    Endoscopy Type:   Flexible Laryngoscopy    Indications for Procedure:   1. Oropharyngeal dysphagia    2. Globus pharyngeus    3. Cough    4. Dysphonia plicae ventricularis    5. Gastroesophageal reflux disease without esophagitis         Procedure Details:    The patient was placed in an upright position.  The laryngoscope was passed left nasal passge.  The nasal cavities, nasopharynx, oropharynx, hypopharynx, and larynx were all examined.  Vocal cords were examined during respiration and phonation.  The following findings were noted:    Findings:   LARYNGOSCOPY FINDINGS :    NASOPHARYNX:     adenoids  Flat, no lesions, Eustachian tubes normal, without lesions, palate with normal mobility without lesions.  OROPHARYNX:     normal mucosa, without lesions, tonsils  Atrophic    Lateral walls:         Redundant          BILATERAL: Mild to moderate    Posterior walls:         normal, no lesions    BASEOF TONGUE:          prolapse  moderate    LINGUAL TONSILS:          BILATERAL: Moderately enlarged, but minimal contact to the epiglottis    VALLECULA:         normal, no lesions, no pooling  Bilateral  EPIGLOTTIS:    normal color, position, without lesions  HYPOPHARYNX:    Lateral walls:         BILATERAL: Mildly redundant    Posterior wall:        Mildly thickened, minimally red  ARYEPIGLOTTIC FOLDS:     normal mucosa, no lesions  ARYTENOIDS:    normal position, normal size, normal mobility, no lesions, tower size normal  FALSE CORDS:     normal mucosa, no lesions, normal mobility  Bilateral  TRUE VOCAL FOLDS:      normal appearance, mobility, no lesions  Bilateral  GLOTTIS:     normal closure, with good cord apposition  SUBGLOTTIS:     unobstructed, patent, no lesions    Condition:  Stable.  Patient tolerated procedure well.    Complications:  None    Post-procedure  instructions reviewed with Patient  Instructions documented in AVS for patient to review

## 2021-10-26 NOTE — PATIENT INSTRUCTIONS
"THE PROBLEM OF ACID REFLUX    In BOTH children and adults, irritating acids may leak from the stomach and come up into the esophagus and throat. This can occur at any time, but occurs more commonly when lying down. When the acid touches the lining of the esophagus, there is irritation and muscle spasm, which can be felt up into the throat. Usually this is felt as \"heartburn\". When scarring of the esophagus occurs, the esophagus becomes less sensitive and the symptoms may only be in the throat.     Some of the symptoms that can occur with acid reflux into the throat include coughing, soreness, burning, hoarseness, throat clearing, excessive mucous, bad taste in the mouth, bad breath, a sensation of a lump in the throat, wheezing, post-nasal drip, choking spells, bleeding and nausea. In a small percentage of people, more serious problems may result, such as pneumonia, ulcers in the larynx (voice box), reduced mobility of the vocal cords and a pouch in the upper esophagus (diverticulum). In children, the symptoms may be different and include persistent vomiting, bleeding from the esophagus, respiratory problems, pneumonia, asthma, choking spells, swallowing problems and anemia. In some cases, unexplained fussiness and crying is due to reflux.     The following instructions are designed to help neutralize the stomach acid, reduce the production of the acid, promote emptying of the acid from the stomach into the intestines and prevent acid from coming up into the esophagus. You should adopt enough of these changes to alleviate your symptoms. If carrying out these suggestions produces no change, it is imperative that you return so that we can discuss further the problem. More testing may be required, as might medication. It is important to realize that the esophagus takes time to heal, so you should not expect results immediately.    Diet  Most often, the throat symptoms above are due to dietary abuses. Certain foods are " known to cause irritation, because they are acids themselves or cause excess production of stomach acid. These should be avoided, if possible. The following is a partial list of foods recognized as troublesome: coffee (even decaffeinated), tea chocolate, cola beverages, citrus beverages (such as 7-Up), caffeine containing beverages, alcohol, citrus fruits and juices, highly spiced foods, fatty foods, candies, nuts, mints, milk and milk products. Some of the worst offenders for promoting stomach acid are milk and beer.     Hard candies, gum, breath fresheners, throat lozenges, cough drops, mouthwashes, gargles, may actually irritate the throat directly (many cough drops and lozenges contain irritants such as oil of eucalyptus and menthol), and can also stimulate acid production directly.     Large amounts of liquid in the diet tend to promote reflux, because liquids tend to come back up more easily than solids.     Meals should be bland and consist of real food, trying to avoid recreational food. Multiple small meals, rather than one or two large meals helps prevent reflux by not stretching the stomach and increasing the time needed for digestion, as well increasing the amount of acid needed to digest the meal. If you are overweight, losing weight is tremendously helpful.     YOU SHOULD NOT EAT FOR AT LEAST TWO HOURS BEFORE RETIRING AND YOU SHOULD REMAIN SITTING OR STANDING FOR AT LEAST 45 MINUTES AFTER EACH MEAL. This promotes passage of food from the stomach into the intestine.    Posture  Body weight is a significant factor in promoting reflux. Losing weight is beneficial. Pregnancy will markedly increase the symptoms of heartburn and throat pain. You should avoid clothing that fits tightly across the mid-section, as this promotes squeezing of the abdominal contents upward. It is helpful to practice abdominal or diaphragmatic breathing, using the stomach to push out each breath rather than chest expansion. Avoid  slumping while sitting, and avoid stooping or straining.     For many, the reflux occurs at night while sleeping. This is the time acid production is the greatest and you are lying down, a position that promotes reflux, thus setting up the irritation that occurs the next morning. One of the most important things you can do is to elevate the head of the bed, in an effort to use gravity to keep the acid in the stomach. This is accomplished by placed blocks or bricks beneath the legs at the head of the bed, raising the head 6-10 inches. Do not make the head so high that you slide down. Pillows are not effective because they cause the body to curl, increasing abdominal pressure and it is difficult to remain upright on them. Additionally, pillows promote sleeping on the back, but it is far more desirable to sleep on the right side or on the stomach, as this allows gas to escape, while preventing the escape of acid material. Children and infants, who are refluxing, should sleep on their stomachs.  Exercise  Regular exercise is extremely beneficial in promoting good digestion and regularity. The abdominal muscles are toned, which aids in controlling acid problems. However, it is recommended that you not participate in vigorous activity immediately after eating. This causes pressure on an already full stomach, forcing acid up into the esophagus. Regular exercise is encouraged, prior to meals, or two hours after meals.  Antacids  Antacids should be used regularly, as they neutralize excess acid. Gelusil II, Mylanta II, Tums and Rolaids are popular brands. Gaviscon is not an antacid, but floats on top of the stomach acid, preventing esophageal irritation. Care should be used in administering large doses of antacids, especially in children. Many antacid preparations contain magnesium, which promotes frequent bowel movements, while antacids that contain aluminum can be constipating. Tums have a high calcium content that can be  used to some advantage in older women with reflux.     Antacid tablets are convenient, but the liquid form tends to work much more quickly. Also remember to check with your physician about interference with other medications. Antacids can slow the absorption of digitalis, Indocin, tetracyclines, fluorides and isoniazid from the intestines. Many medications require the presence of stomach acid to be absorbed.     Initially, antacids should be used 30 minutes after each meal, 2 hours after each meal and at bedtime. This maximizes the neutralization of the stomach acid. Antacids can be used more frequently if symptoms persist, but such extensive use needs to be reviewed with your physician.  Prescription Medications  If the above recommendations are not effectively resolving the symptoms, medications may be prescribed. These are usually in the form of acid production blockers, such as Tagamet, Zantac, Pepcid, or Axid or acid pump inhibitors like Prevacid, Nexium, Protonix or Prilosec. These drugs help stop acid production in the stomach. Medications such as Reglan can be used to promote stomach emptying.  Medications That Promote Reflux  Certain medications can promote acid reflux; either by relaxing the sphincter muscle that helps keeps stomach acid down, or increasing the acid production. These include progesterone (Provera, Ortho Novum, Ovral, other birth control pills), theophylline (Yves-Dur, etc.), anticholinergic (Donnatal, Scopolamine, Probanthine, Bentil, others), beta blockers (Inderal, Tenormin and Corgard), alpha blockers (Dibenzyline), dopamine, calcium channel blockers (Procardia, Cardizem, Isotin, Calan), aspirin, non-steroidal anti-inflammatory drugs (Motrin, Advil, Nuprin, Nalfon, Rufen, Indocin, Feldene, Clinoril and Tolectin). Vitamin C is an acid and can promote reflux. This is only a partial list and before stopping any prescription medication, you should check with your physician.    Goal  The  goal is to reduce the symptoms with the least number of lifestyle changes. A combination of the above suggestions is frequently prescribed to return you to normal as quickly as possible. However, this problem did not develop overnight and will not disappear rapidly. Therefore, developing a regimen will aid in controlling symptoms and keep you symptom free for a long period of time. Other alternatives exist, should these suggestions fail, and can be discussed with your physician.     To Summarize:  Watch your diet, avoid foods that cause acid reflux  Lose weight  Avoid tight fitting clothes  Adjust your posture, raise the head of the bed  Exercise regularly  Use antacids  Use prescription medication as directed  Avoid medications that promote reflux  Avoid behavior and eating habits that promote reflux of stomach acid     You are welcome to do a Google search on the topic of reflux and reflux diets. The internet has many useful resources.     Please remember, your success in controlling this condition depends on many factors including diet, exercise, medications and follow up. All are important and must be utilized to achieve success.     ANTACID USE:  Use antacids 30 mins after every meal, 2 hours after every meal and at Bedtime  Use Gaviscon Extra (best), Tums, Rolaids, etc  Over the counter  Use 2 tsp or 2 tabs as the dose  Remember that some of these products contain Calcium or Aluminum. If you have dietary or medical issues, please inform physician    Prilosec at bedtime    NASAL SALINE:  Use 2 puffs each nostril 4-6 times daily and more frequently if possible.  You can buy saline spray or you can make your own and use an old spray bottle to administer  Use a humidifier at bedside  Recipe for saline:  Water                                 1 quart  Salt (table)                        1 tablespoon  Gylcerin (or Mine Syrup)    1 teaspoon  Sodium bicarbonate           1 teaspoon  Sprays or [place] pots are  recommended    Nasal steroid use:  Using nasal steroids:  You will be prescribed one of the following nasal steroids: Flonase, Nasacort, Nasonex, Rhinocort, Qnasl, Zetonna  2 puffs each nostril 2 times daily  Start as soon as possible  If you are using Afrin for 3 days with the nasal steroid,  Use Afrin first and wait 10 minutes to allow the nose to open. Then administer nasal steroids.    Diet  Exercise    See family physician for contnued care    VOICE HYGIENE:    Many factors go into manufacturing what we call the voice. More goes on than just breathing out and using the voice box and mouth. Creating the voice requires good lung function, a healthy voice box, and complex muscle coordination. Any malfunction in any of the systems and voice problems can occur.    The most common problem seen in the office is hoarseness. Other types of voice problems can include breathiness, double voice, raspy voice, or simply a change in the pitch. Many things can cause a hoarse or weak voice, including laryngitis, vocal abuse (screaming, cheering, singing too loud for too long), smoking, etc. In order to treat the problem, your physician first must find the cause, then try to correct it to restore your normal voice.    The first part of a voice analysis is a detailed history of the problem, including any habits such as smoking, to delineate possible factors. The second step is a careful examination of the head and neck, including the voice box. The examination is essential, as this eliminates any anatomic problems with the vocal cords and the surrounding structures. More detailed analysis may be used in the form of videotaping the vocal cords with a stroboscopic light, thus providing additional information.     Suggestions for Voice Use and Conservation    Treatment of voice problems depends on the cause of the problems. Fortunately, most problems are easily correctable. The following are a few suggestions you will be asked to  carry out, depending on the results of your examination.    >Avoid stress  >Avoid habitually clearing throat as this strains and fatigues the vocal cords  >Avoid yelling, especially for extended periods of time  >Avoid falsetto singing  >Avoid talking when it hurts or if your neck is excessively tired.  >Do exercise regularly to maintain fitness and breathe control. Do eat correctly, to avoid acid reflux  >Drink 6-8 glasses of water daily to keep the vocal cords flexible, making it easier to speak  >Do take sips of water while speaking as swallowing helps relax the muscles of the throat and neck  >Do sip water, sniff or say the letter “H” forcefully rather than clearing throat  >Do listen to your voice as you speak to avoid trailing off or “tightness” at the end of sentences  >Do use plenty of breath while speaking. If you feel you are running out of breath, stop, breathe then continue.  >Do speak easily rather than pushing, forcing or straining  >Do stop talking and take a voice break, especially if you are fading or fatiguing  >Do increase your awareness of your voice. When does it sound better, worse? >Recording your voice or watching yourself speak in a mirror to watch for movement in the neck and shoulders can be beneficial.    Whispering is not a protective mechanism for the voice. In fact, whispering can make your condition worse. In order to limit further damage to your voice, maintain a normal volume and tone. Only in certain circumstances will this recommendation require modification. Dr. Renteria will tell you if that is the case.    Complete voice rest is used occasionally to treat voice and vocal cord problems. However, this is a rare situation. Dr. Renteria will tell you if this is indicated.    A great deal of voice hygiene is based on common sense. If it hurts to talk, then a problem exists.    Vocal Strengthening Exercises    >Sit in a comfortable and upright position. The chair should have a hard  back and hard arm rests.  >Rest your hands on the arms of the chair. Push down with both of your hands at the same time. Do not use too much shoulder movement, simply push with your hands. You should feel a tightening of your abdominal muscles, not your neck muscles.  >After you become comfortable with the pushing motion, begin to make a short sound (such as at/ ah) each time you push down. You can substitute other short sounds such as a vowel sound or counting.  >Be sure to take a deep breath before each push down and vocalization of the sound.  Summary of exercise sequence:   Take a deep breath in  Push down while saying ah  Do NOT hold your breath  Relax and breathe out  Repeat above sequence 15-20 times every hour or several times each day.    Speech Pathology    Frequently, physicians and speech pathologists treat voice disorders together. These are highly trained individuals who specialize in therapy for the voice box and throat. You may recognize the better as speech, swallowing or voice therapists. Dr. Renteria may elect to refer you for evaluation and treatment, depending on your diagnosis and condition.    Special Testing    You may be referred certain tests to fully evaluate your throat, swallowing or voice. These may include CAT scans, swallowing tests, MRIs, or videostroboscopy. You may not be familiar these tests but the results will discussed them with you at the time of your office visit and answer your questions about them.            CONTACT INFORMATION:  The main office phone number is [phone]. For emergencies after hours and on weekends, this number will convert over to our answering service and the on call provider will answer. Please try to keep non emergent phone calls/ questions to office hours 9am-5pm Monday through Friday.     [employer]  As an alternative, you can sign up and use the Epic MyChart system for more direct and quicker access for non emergent questions/ problems.  [facility] [employer] allows you to send messages to your doctor, view your test results, renew your prescriptions, schedule appointments, and more. To sign up, go to [email or web address] and click on the Sign Up Now link in the New User? box. Enter your [employer] Activation Code exactly as it appears below along with the last four digits of your Social Security Number and your Date of Birth () to complete the sign-up process. If you do not sign up before the expiration date, you must request a new code.    [employer] Activation Code: [email or web address]  Expires: 2021  5:28 AM    If you have questions, you can email [email or web address] or call [phone] to talk to our [employer] staff. Remember, [employer] is NOT to be used for urgent needs. For medical emergencies, dial 911.

## 2021-10-26 NOTE — PROGRESS NOTES
[facility] Otolaryngology Head and Neck Surgery  CLINIC NOTE    Chief Complaint   Patient presents with   • Follow-up     recheck dysphagia          HPI   Follow up  Accompanied by:  No one  Nikki Santillan is a 76 y.o. female who is here for follow up. She has had improved voice. Improved swallowing.  Cough improved. Mostly at night.  Cough still wakes her.  Able to sing at [employer] Sunday.  She slept in recliner one night and cough resolved.  She is status post No surgery found on No surgery found.        History     Last Reviewed by Shine Renteria Jr., MD on 10/26/2021 at  9:29 AM    Sections Reviewed    Medical, Family, Tobacco, Surgical      Problem list reviewed by Shine Renteria Jr., MD on 10/26/2021 at  9:29 AM  Medicines reviewed by Shine Renteria Jr., MD on 10/26/2021 at  9:29 AM  Allergies reviewed by Shine Renteria Jr., MD on 10/26/2021 at  9:29 AM         Vital Signs:   Heart Rate:  [71] 71    Physical Exam  Vitals reviewed.   Constitutional:       Appearance: Normal appearance. She is well-developed and overweight.      Comments: Hoarse   HENT:      Head: Normocephalic and atraumatic.      Right Ear: Tympanic membrane and external ear normal.      Left Ear: Tympanic membrane and external ear normal.      Ears:      Comments: EACs too clean     Nose: Septal deviation (R to l mid mild, L to R low moderate) present.      Right Turbinates: Not enlarged or swollen.      Left Turbinates: Not enlarged or swollen.      Mouth/Throat:      Dentition: Abnormal dentition (missing some upper teeth, good repair). No gum lesions.      Tongue: No lesions. Tongue does not deviate from midline.      Palate: No mass and lesions.      Pharynx: Oropharynx is clear. Uvula midline.      Tonsils: No tonsillar exudate or tonsillar abscesses.   Eyes:      General: Lids are normal. Gaze aligned appropriately.      Extraocular Movements: Extraocular movements intact.       Conjunctiva/sclera: Conjunctivae normal.      Comments: Color blue   Neck:      Thyroid: No thyroid mass or thyromegaly.      Vascular: No carotid bruit.      Trachea: Trachea normal.      Comments: VOICE:   hoarse-  mild    raspy-  mild    breathy-minimal    weak-  mild    pitch: Slightly lowered    sentence length: Normal    intensity: Slightly decreased  Pulmonary:      Effort: Pulmonary effort is normal. No respiratory distress.      Breath sounds: No stridor.   Musculoskeletal:         General: Normal range of motion.      Cervical back: Normal range of motion.   Lymphadenopathy:      Cervical: No cervical adenopathy.   Skin:     Findings: No rash.   Neurological:      General: No focal deficit present.      Mental Status: She is alert and oriented to person, place, and time.      Cranial Nerves: Cranial nerves are intact. No cranial nerve deficit.   Psychiatric:         Attention and Perception: Attention and perception normal.         Mood and Affect: Mood and affect normal.         Behavior: Behavior normal. Behavior is cooperative.         Thought Content: Thought content normal.         Cognition and Memory: Cognition and memory normal.         Judgment: Judgment normal.               Result Review       I have reviewed the patients old records in the chart.    Treatment with Jessica Daley CCC-SLP (10/19/2021)   Treatment with Jessica Daley CCC-SLP (10/25/2021)             Assessment:        Diagnosis Plan   1. Oropharyngeal dysphagia      Improved   2. Globus pharyngeus      Improved   3. Cough      Improved   4. Dysphonia plicae ventricularis      Improved   5. Gastroesophageal reflux disease without esophagitis      Improved, partially controlled   6. Lingual tonsil hypertrophy      Moderate but improved              Plan:        Continue current management plan.  Patient has improved all her symptomatology.  Her cough in particular is improved.  She still has some coughing with lying down at  night.  She does not have when she is in a recliner.  I recommend she discuss with her primary care possible GI referral for hiatal hernia evaluation and/or reflux repair.  She should continue her current reflux regimen.  She should call me if there is any change in her voice.  I will return her to her primary care for reflux care.  Reflux precautions  See PCP for possible GI referral  Diet  Exercise  Prilosec at bedtime  Antacids  Flonase BID  Nasal saline  Voice hygiene             MY CHART:  Patient is Encouraged to enroll in My Chart  Encouraged to review data and findings in My Chart    Patient understand(s) and agree(s) with the treatment plan as described.    Return if symptoms worsen or fail to improve, for Recheck Voice.            Shine Renteria Jr, MD  10/26/21  09:37 CDT

## 2022-01-10 ENCOUNTER — TELEPHONE (OUTPATIENT)
Dept: FAMILY MEDICINE CLINIC | Facility: CLINIC | Age: 77
End: 2022-01-10

## 2022-01-10 NOTE — TELEPHONE ENCOUNTER
Back door/dr cassidy today or tomorrow  erika to open some of tomorrow at some point today    PATIENT WAS DIRECTED TO CONTACT THE OFFICE TO LET THEM KNOW SHE HAS TESTED POSITIVE FOR COVID WITH AN AT HOME TEST. PATIENT HAS A COUGH AND SHE IS REALLY FATIGUE, HEAD AND NECK PAIN.    SHE WOULD LIKE A CALL BACK FOR FURTHER INSTRUCTION [phone]

## 2022-01-11 ENCOUNTER — OFFICE VISIT (OUTPATIENT)
Dept: FAMILY MEDICINE CLINIC | Facility: CLINIC | Age: 77
End: 2022-01-11

## 2022-01-11 VITALS — OXYGEN SATURATION: 95 % | TEMPERATURE: 97.8 F | HEART RATE: 84 BPM

## 2022-01-11 DIAGNOSIS — J98.4 CHRONIC LUNG DISEASE: ICD-10-CM

## 2022-01-11 DIAGNOSIS — U07.1 COVID-19 VIRUS INFECTION: ICD-10-CM

## 2022-01-11 PROBLEM — Z86.16 HISTORY OF COVID-19: Status: ACTIVE | Noted: 2022-01-11

## 2022-01-11 PROCEDURE — 99213 OFFICE O/P EST LOW 20 MIN: CPT | Performed by: FAMILY MEDICINE

## 2022-01-11 NOTE — PROGRESS NOTES
Subjective   Nikki Santillan is a 76 y.o. female presenting with chief complaint of:   Chief Complaint   Patient presents with   • covid 19 infection     Pt tested positive yesterday   • Cough       History of Present Illness :  Alone/back door.  Here for primarily an acute issue today; covid +  [place] 3.17.21, and 12.14.21.  Flu 10.19.21.   Around daughter 1.7.22-1.8.22; ill 1.9.22 with body aches fatigue; since cough.   CXR 7.21 [facility] neg.      Has multiple chronic problems to consider that might have a bearing on today's issues; not an interval appointment.       Chronic/acute problems reviewed today:   1. Chronic lung disease    2. COVID-19 virus infection      Has an/another acute issue today: none.    The following portions of the patient's history were reviewed and updated as appropriate: allergies, current medications, past family history, past medical history, past social history, past surgical history and problem list.      Current Outpatient Medications:   •  albuterol sulfate  (90 Base) MCG/ACT inhaler, Inhale 2 puffs As Needed for Wheezing or Shortness of Air., Disp: 1 inhaler, Rfl: 3  •  cholecalciferol (VITAMIN D3) 1000 units tablet, Take 1,000 Units by mouth 2 (Two) Times a Day., Disp: , Rfl:   •  citalopram (CeleXA) 20 MG tablet, Take 1 tablet by mouth Daily., Disp: 90 tablet, Rfl: 1  •  omeprazole (priLOSEC) 20 MG capsule, Take 20 mg by mouth Daily., Disp: , Rfl:   •  pravastatin (PRAVACHOL) 40 MG tablet, Take 1 tablet by mouth Every Night., Disp: 90 tablet, Rfl: 3    No problems with medications.  Refills if needed done    Allergies   Allergen Reactions   • Bacitracin-Neomycin-Polymyxin Itching   • Dilaudid [Hydromorphone Hcl] Other (See Comments)     Oxygen stats dropped & mental altered status   • Promethazine Hcl Anxiety   • Ace Inhibitors Other (See Comments)     Family history of intolerance.   • Blephamide [Sulfacetamide-Prednisolone] Rash   • Neosporin [Neomycin-Bacitracin Zn-Polymyx] Rash    • Other Rash     Neodecadron eye drop     • Phenergan [Promethazine] Anxiety   • Sulfa Antibiotics Rash       Review of Systems   Constitutional: Positive for activity change, appetite change and fatigue. Negative for fever.   HENT: Positive for congestion and sore throat.    Respiratory: Positive for cough. Negative for shortness of breath and wheezing.    Cardiovascular: Negative for chest pain and leg swelling.   Gastrointestinal: Negative for abdominal pain.   Genitourinary: Negative for difficulty urinating.   Skin: Negative for rash.       Lab Results:  Results for orders placed or performed in visit on 10/11/21   Basic Metabolic Panel    Specimen: Blood   Result Value Ref Range    Glucose 99 65 - 99 mg/dL    BUN 9 8 - 23 mg/dL    Creatinine 0.70 0.57 - 1.00 mg/dL    eGFR Non African Am 81 >60 mL/min/1.73    eGFR African Am 98 >60 mL/min/1.73    BUN/Creatinine Ratio 12.9 7.0 - 25.0    Sodium 141 136 - 145 mmol/L    Potassium 4.4 3.5 - 5.2 mmol/L    Chloride 102 98 - 107 mmol/L    Total CO2 27.7 22.0 - 29.0 mmol/L    Calcium 9.7 8.6 - 10.5 mg/dL   Hemoglobin A1c    Specimen: Blood   Result Value Ref Range    Hemoglobin A1C 5.55 4.80 - 5.60 %   CBC & Differential    Specimen: Blood   Result Value Ref Range    WBC 5.20 3.40 - 10.80 10*3/mm3    RBC 5.16 3.77 - 5.28 10*6/mm3    Hemoglobin 14.2 12.0 - 15.9 g/dL    Hematocrit 45.5 34.0 - 46.6 %    MCV 88.2 79.0 - 97.0 fL    MCH 27.5 26.6 - 33.0 pg    MCHC 31.2 (L) 31.5 - 35.7 g/dL    RDW 13.6 12.3 - 15.4 %    Platelets 237 140 - 450 10*3/mm3    Neutrophil Rel % 58.8 42.7 - 76.0 %    Lymphocyte Rel % 28.3 19.6 - 45.3 %    Monocyte Rel % 7.5 5.0 - 12.0 %    Eosinophil Rel % 3.8 0.3 - 6.2 %    Basophil Rel % 1.2 0.0 - 1.5 %    Neutrophils Absolute 3.06 1.70 - 7.00 10*3/mm3    Lymphocytes Absolute 1.47 0.70 - 3.10 10*3/mm3    Monocytes Absolute 0.39 0.10 - 0.90 10*3/mm3    Eosinophils Absolute 0.20 0.00 - 0.40 10*3/mm3    Basophils Absolute 0.06 0.00 - 0.20 10*3/mm3     Immature Granulocyte Rel % 0.4 0.0 - 0.5 %    Immature Grans Absolute 0.02 0.00 - 0.05 10*3/mm3    nRBC 0.0 0.0 - 0.2 /100 WBC       A1C:  Lab Results - Last 18 Months   Lab Units 10/11/21  0708 04/12/21  0707 10/06/20  0708   HEMOGLOBIN A1C % 5.55 5.90* 5.70*     PSA:No results for input(s): PSA in the last 12344 hours.  CBC:  Lab Results - Last 18 Months   Lab Units 10/11/21  0708 04/12/21  0707 10/06/20  0708   WBC 10*3/mm3 5.20 4.97 5.64   HEMOGLOBIN g/dL 14.2 14.3 14.6   HEMATOCRIT % 45.5 43.1 42.6   PLATELETS 10*3/mm3 237 239 231      BMP/CMP:  Lab Results - Last 18 Months   Lab Units 10/11/21  0708 04/12/21  0707 10/06/20  0708   SODIUM mmol/L 141 140 139   POTASSIUM mmol/L 4.4 3.8 4.0   CHLORIDE mmol/L 102 102 99   TOTAL CO2 mmol/L 27.7 26.7 27.2   BUN mg/dL 9 14 9   CREATININE mg/dL 0.70 0.64 0.72   EGFR IF NONAFRICN AM mL/min/1.73 81 90 79   EGFR IF AFRICN AM mL/min/1.73 98 109 95   CALCIUM mg/dL 9.7 10.0 9.7     HEPATIC:  Lab Results - Last 18 Months   Lab Units 04/12/21  0707   ALT (SGPT) U/L 18   AST (SGOT) U/L 22   ALK PHOS U/L 60     THYROID:  Lab Results - Last 18 Months   Lab Units 04/12/21  0707   TSH uIU/mL 3.330       Objective   Pulse 84   Temp 97.8 °F (36.6 °C) (Infrared)   LMP  (LMP Unknown)   SpO2 95%   There is no height or weight on file to calculate BMI.    Physical Exam  Vitals reviewed.   Constitutional:       General: She is not in acute distress.     Appearance: Normal appearance. She is not ill-appearing or toxic-appearing.   HENT:      Mouth/Throat:      Mouth: Mucous membranes are moist.   Eyes:      Extraocular Movements: Extraocular movements intact.      Conjunctiva/sclera: Conjunctivae normal.      Pupils: Pupils are equal, round, and reactive to light.   Cardiovascular:      Rate and Rhythm: Normal rate and regular rhythm.      Heart sounds: Normal heart sounds.   Pulmonary:      Effort: Pulmonary effort is normal.      Breath sounds: Normal breath sounds.   Abdominal:       Palpations: Abdomen is soft.   Musculoskeletal:      Cervical back: Neck supple.      Left lower leg: No edema.   Lymphadenopathy:      Cervical: No cervical adenopathy.   Neurological:      Mental Status: She is alert and oriented to person, place, and time. Mental status is at baseline.       Assessment/Plan     1. Chronic lung disease    2. COVID-19 virus infection      Rx: reviewed/changes:  No orders of the defined types were placed in this encounter.    LAB/Testing/Referrals: reviewed/orders:   Today:   No orders of the defined types were placed in this encounter.    Chronic/recurrent labs above or change to:   same     Discussions:   below    Other COVID considerations  When has covid disease; typical isolation is 10 days from initial symptoms (could be extended if significant illness/hospitalization or fever day 9-10)  Covid vaccines are very helpful to lower the chance of hospitalization, death; should be continued/acquired as needed post COVID illness, and 90 days after monoclonal antibodies  Monoclonal antibodies are sometimes recommended to lower the chance of hospitalization/death but can only be given before worsening/hospitalization and in the first 10 days.   Regular OTC Rx can be used such as robitussin DM, brief afrin, Tylenol and usual medications especially respiratory inhalers/medications.   ER if any worsening/SOB; do not delay as there are treatments done in the hospital not done as an outpatient      There are no Patient Instructions on file for this visit.    Follow up: No follow-ups on file.  Future Appointments   Date Time Provider Department Center   4/18/2022  8:25 AM [facility]   4/20/2022  9:15 AM Gael Carrillo MD [facility]

## 2022-05-06 DIAGNOSIS — E78.2 MIXED HYPERLIPIDEMIA: ICD-10-CM

## 2022-05-06 DIAGNOSIS — E55.9 VITAMIN D DEFICIENCY: ICD-10-CM

## 2022-05-06 DIAGNOSIS — F41.9 ANXIETY: ICD-10-CM

## 2022-05-06 DIAGNOSIS — R73.01 ELEVATED FASTING GLUCOSE: Primary | ICD-10-CM

## 2022-05-09 ENCOUNTER — LAB (OUTPATIENT)
Dept: FAMILY MEDICINE CLINIC | Facility: CLINIC | Age: 77
End: 2022-05-09

## 2022-05-10 LAB
25(OH)D3+25(OH)D2 SERPL-MCNC: 60.5 NG/ML (ref 30–100)
ALBUMIN SERPL-MCNC: 4.8 G/DL (ref 3.5–5.2)
ALBUMIN/GLOB SERPL: 2.7 G/DL
ALP SERPL-CCNC: 69 U/L (ref 39–117)
ALT SERPL-CCNC: 19 U/L (ref 1–33)
AST SERPL-CCNC: 21 U/L (ref 1–32)
BASOPHILS # BLD AUTO: 0.06 10*3/MM3 (ref 0–0.2)
BASOPHILS NFR BLD AUTO: 1.1 % (ref 0–1.5)
BILIRUB SERPL-MCNC: 0.4 MG/DL (ref 0–1.2)
BUN SERPL-MCNC: 14 MG/DL (ref 8–23)
BUN/CREAT SERPL: 21.9 (ref 7–25)
CALCIUM SERPL-MCNC: 9.7 MG/DL (ref 8.6–10.5)
CHLORIDE SERPL-SCNC: 101 MMOL/L (ref 98–107)
CHOLEST SERPL-MCNC: 191 MG/DL (ref 0–200)
CHOLEST/HDLC SERPL: 3.54 {RATIO}
CO2 SERPL-SCNC: 27.2 MMOL/L (ref 22–29)
CREAT SERPL-MCNC: 0.64 MG/DL (ref 0.57–1)
EGFRCR SERPLBLD CKD-EPI 2021: 90.6 ML/MIN/1.73
EOSINOPHIL # BLD AUTO: 0.15 10*3/MM3 (ref 0–0.4)
EOSINOPHIL NFR BLD AUTO: 2.8 % (ref 0.3–6.2)
ERYTHROCYTE [DISTWIDTH] IN BLOOD BY AUTOMATED COUNT: 13.2 % (ref 12.3–15.4)
GLOBULIN SER CALC-MCNC: 1.8 GM/DL
GLUCOSE SERPL-MCNC: 96 MG/DL (ref 65–99)
HBA1C MFR BLD: 5.9 % (ref 4.8–5.6)
HCT VFR BLD AUTO: 44.3 % (ref 34–46.6)
HDLC SERPL-MCNC: 54 MG/DL (ref 40–60)
HGB BLD-MCNC: 14.2 G/DL (ref 12–15.9)
IMM GRANULOCYTES # BLD AUTO: 0.03 10*3/MM3 (ref 0–0.05)
IMM GRANULOCYTES NFR BLD AUTO: 0.6 % (ref 0–0.5)
LDLC SERPL CALC-MCNC: 115 MG/DL (ref 0–100)
LYMPHOCYTES # BLD AUTO: 1.49 10*3/MM3 (ref 0.7–3.1)
LYMPHOCYTES NFR BLD AUTO: 27.4 % (ref 19.6–45.3)
MCH RBC QN AUTO: 27.6 PG (ref 26.6–33)
MCHC RBC AUTO-ENTMCNC: 32.1 G/DL (ref 31.5–35.7)
MCV RBC AUTO: 86.2 FL (ref 79–97)
MONOCYTES # BLD AUTO: 0.39 10*3/MM3 (ref 0.1–0.9)
MONOCYTES NFR BLD AUTO: 7.2 % (ref 5–12)
NEUTROPHILS # BLD AUTO: 3.31 10*3/MM3 (ref 1.7–7)
NEUTROPHILS NFR BLD AUTO: 60.9 % (ref 42.7–76)
NRBC BLD AUTO-RTO: 0 /100 WBC (ref 0–0.2)
PLATELET # BLD AUTO: 232 10*3/MM3 (ref 140–450)
POTASSIUM SERPL-SCNC: 4.2 MMOL/L (ref 3.5–5.2)
PROT SERPL-MCNC: 6.6 G/DL (ref 6–8.5)
RBC # BLD AUTO: 5.14 10*6/MM3 (ref 3.77–5.28)
SODIUM SERPL-SCNC: 140 MMOL/L (ref 136–145)
TRIGL SERPL-MCNC: 123 MG/DL (ref 0–150)
TSH SERPL DL<=0.005 MIU/L-ACNC: 3.49 UIU/ML (ref 0.27–4.2)
VLDLC SERPL CALC-MCNC: 22 MG/DL (ref 5–40)
WBC # BLD AUTO: 5.43 10*3/MM3 (ref 3.4–10.8)

## 2022-05-12 ENCOUNTER — OFFICE VISIT (OUTPATIENT)
Dept: FAMILY MEDICINE CLINIC | Facility: CLINIC | Age: 77
End: 2022-05-12

## 2022-05-12 VITALS
HEART RATE: 64 BPM | WEIGHT: 158.2 LBS | OXYGEN SATURATION: 98 % | HEIGHT: 64 IN | DIASTOLIC BLOOD PRESSURE: 78 MMHG | RESPIRATION RATE: 18 BRPM | BODY MASS INDEX: 27.01 KG/M2 | TEMPERATURE: 97.1 F | SYSTOLIC BLOOD PRESSURE: 118 MMHG

## 2022-05-12 DIAGNOSIS — R05.3 CHRONIC COUGH: ICD-10-CM

## 2022-05-12 DIAGNOSIS — E55.9 VITAMIN D DEFICIENCY: ICD-10-CM

## 2022-05-12 DIAGNOSIS — E78.2 MIXED HYPERLIPIDEMIA: Chronic | ICD-10-CM

## 2022-05-12 DIAGNOSIS — K21.9 GASTROESOPHAGEAL REFLUX DISEASE WITHOUT ESOPHAGITIS: Chronic | ICD-10-CM

## 2022-05-12 DIAGNOSIS — Z86.16 HISTORY OF COVID-19: ICD-10-CM

## 2022-05-12 DIAGNOSIS — F32.A DEPRESSION, UNSPECIFIED DEPRESSION TYPE: Chronic | ICD-10-CM

## 2022-05-12 DIAGNOSIS — C50.911 BILATERAL MALIGNANT NEOPLASM OF BREAST IN FEMALE, UNSPECIFIED ESTROGEN RECEPTOR STATUS, UNSPECIFIED SITE OF BREAST: ICD-10-CM

## 2022-05-12 DIAGNOSIS — Z71.85 VACCINE COUNSELING: ICD-10-CM

## 2022-05-12 DIAGNOSIS — R73.01 ELEVATED FASTING GLUCOSE: ICD-10-CM

## 2022-05-12 DIAGNOSIS — M25.511 ACUTE PAIN OF RIGHT SHOULDER: ICD-10-CM

## 2022-05-12 DIAGNOSIS — C50.912 BILATERAL MALIGNANT NEOPLASM OF BREAST IN FEMALE, UNSPECIFIED ESTROGEN RECEPTOR STATUS, UNSPECIFIED SITE OF BREAST: ICD-10-CM

## 2022-05-12 PROBLEM — C50.919 BREAST CANCER (HCC): Status: ACTIVE | Noted: 2018-04-06

## 2022-05-12 PROCEDURE — 99214 OFFICE O/P EST MOD 30 MIN: CPT | Performed by: FAMILY MEDICINE

## 2022-05-12 RX ORDER — METHYLPREDNISOLONE 4 MG/1
TABLET ORAL
Qty: 1 EACH | Refills: 0 | Status: SHIPPED | OUTPATIENT
Start: 2022-05-12 | End: 2022-07-06

## 2022-05-12 NOTE — PATIENT INSTRUCTIONS
"Medicare/insurances offer certain visits called \"wellness/annual\" that allows for time to deal with and  review the many aspects of \"being well\" that just might not get mentioned during other visits with your doctor through the year.  This includes things like reviews of health screenings (mammograms, various labs),  weight, exercise, vaccines for just a few examples.      In order to help you with this we wish to make you aware of a few things for you to consider:    1. Advanced directives.  These are documents used to help direct your care if your health/situation should reach a point that you cannot make your own decisions.  While it is likely you do not currently have a need for these documents now; it is something that we all might face at any time.   The hand outs you are being given today are simply for you to review and use to learn more about these documents and consider them as you wish.      2. Vaccines: Certain vaccines are important after age 50, 60, and 65 and some health situations (for example COPD), require even boosters beyond age 65.  We are happy to review with you your vaccine status and vaccines that might be needed for you at this point:      a. Tetanus.   Like anyone this needs to given every 10 years; sooner for/with lacerations/wounds.   Likely when getting this booster it needs to be a tetanus called Tdap (tetanus mixed with diptheria and pertussis).   Years ago you had this vaccine.  We now know we can lose our immunity to pertussis (a part of this vaccine) and run a risk of catching this.  Now only would this make us ill; but more importantly we can spread this to very young children (and for them it can be a much more dangerous illness).   We call this the grandparent vaccine for this reason.     b. Pneumonia (strept).   This comes now with two brands.   It is recommended to take pneumovax first; and a year later take the cousin prevnar.  Even if you have had these before; we need to " review when and your current health situation/s as you may need boosters and even recently the CDC has made recent/new recommendations for pneumovax.      c. Shingles.  You do not want to catch shingles.  Though you will recover from this; the pain associated with shingles can be severe.  Even if you have had the now older zostavax, or have had shingles; it is recommended you still get the Shingrix (the new vaccine just available early 2018 shingles vaccine).  A new shingles vaccine (a shot to lower your chance of catching shingles) is now available (shingrix).  This vaccine is the second vaccine created for this purpose; (we have had zostavax for years).  Shingrix provides a much better and longer immunity for shingles than zostavax.  For this and other reasons Shingrix can be started at age 50.  If you have had zostavax in the past; you can still take Shingrix.      This vaccine is not paid for in a doctor's office by medicare, medicaid and probably most insurances.  Like zostavax; this is covered in drug stores.  This is a vaccine that if you chose to get you need to get at a drug store that gives vaccines (like [facility], [facility] and [facility].      d. Yearly flu vaccine given from September through April each year (there is a special vaccine for those over 65).     e. Travel vaccines:  If you are one to do international travel; be sure and ask us for any particular unusual vaccines you may need.     f.  Miscellaneous:  If you have certain health situations/disease you may need specific/particular vaccines not give to the general public.     g.  Covid: currently recommended everyone over 5.  The brands Pfizer/Moderna are for 3 total shots as immunity will wane from less than this.  Rolando/Rolando has a version that comes with recommendations for an initial vaccine and booster after.  I no longer recommend J&J as a first choice as Pfizer/Moderna are readily available.  If you have had an  initial J&J I recommend you booster with Moderna.   I strongly recommend covid vaccination; being unvaccinated or partially vaccinated carries real risk for disease and even death.     Because of many restrictions on this office always having all the above vaccines; you may be advised to work with your local health department to keep up with your individual vaccine needs.    The vaccines we have on record for you include:   Immunization History   Administered Date(s) Administered    COVID-19 ([facility]) 03/17/2021, 12/14/2021    Fluzone High-Dose 65+yrs 10/19/2021    Tdap 10/19/2021       If you have record of other vaccines and want them to show in your chart here; please talk to our nurses about having your vaccine record updated.     3. Exercise: regular cardio exercise something everyone should consider and try to do; even if health limitations (ie find that exercise UE/LE/cardio that they can tolerate).   Normal weight a goal for everyone (as we discussed)    4. Healthy diet helpful for weight management, illness prevention.     5. If over 50-screening exams include men PSA/rectal exam, women mammograms, and everyone colonoscopy screening for colon cancer.    6. If you use tobacco of any kind or e-products you should stop. We are providing you some information to consider that could make this process easier.      ##################################    When using steroids  A. Do not use anti-inflammatories such as Motrin/ibuprofen, Alleve/naprosyn, Mobic and like medications  B. Stay on your stomach medicines for ulcer/like (like prilosec, protonix, nexium)  C. If you are diabetic or pre-diabetic; it will raise your blood sugar.  For most people this will be a minimal and very tolerated elevation.  If you check your blood sugars (or have the ability to check) you should consider for the first days of the steroid Rx to check your blood sugar more often (1-3/days).  Call if you see blood sugars over 350.  Being more  strict on your diabetic diet while using the steroid will help.     If using insulin: add sliding scale to your program:     Based on BS give this much insulin EXTRA to any other insulin/diabetic medication being used.     150-199 2 units  200-249 3 units  250-299 4 units  300-349 5 units  350-400 6 units  Greater 400 7 units

## 2022-05-12 NOTE — PROGRESS NOTES
"Subjective   Nikki Santillan is a 77 y.o. female presenting with chief complaint of:   Chief Complaint   Patient presents with   • Shoulder Pain     My right shoulder muscle has been bothering me for a week and half or so. Im moving, but each day it is getting worse.   • Follow-up     \"I had my lab\"       History of Present Illness :  Alone.  Here includes soreness R shouder; an acute issue today.   Here for review of chronic problems that includes GI reflex and others.    Has multiple chronic problems to consider that might have a bearing on today's issues;  an interval appointment.       Chronic/acute problems reviewed today:   1. Vitamin D deficiency chronic/variable up/down with past labs and/or risk to run low especially in winter. Lab monitored.      2. Mixed hyperlipidemia Chronic/stable.  Tolerated use of Rx with labs showing improved lipid values and tolerant liver labs. No muscle aches unexpected.      3. Gastroesophageal reflux disease without esophagitis Chronic/stable.  Controlled heartburn, reflux without dysphagia, melena.  Rx used works, periods not used proven needed with symptoms -currently doing ok.      4. Bilateral malignant neoplasm of breast in female, unspecified estrogen receptor status, unspecified site of breast (HCC) Chronic/ongoing yearly need to review for breast cancer.  No breast; no chest wall lumps/pain.    5. Depression, unspecified depression type chronic past significant  mood swings, down moods, nervousness, difficulty with concentration to function home/work.  Others close have not been concerned.  No suicide ideation/intent.  Rx helps.   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 Chronic cough-reflux chronic variable cough over time that was proven to be reflux; it is well controlled now with no hemoptysis shortness of breath   7. History of COVID-19 chronic history of COVID at had a positive home test and negative [facility] " test; but was very suggestive for symptomatic COVID.  Treated with monoclonal.  Question if she should have a booster today.   8. Acute pain of right shoulder no known injury.  Heavy worker over the years.  1 to 2 weeks of discomfort right shoulder with abduction; in the shoulder.  No neck pain or upper extremity weakness lower extremity weakness.     Has an/another acute issue today: none.    The following portions of the patient's history were reviewed and updated as appropriate: allergies, current medications, past family history, past medical history, past social history, past surgical history and problem list.      Current Outpatient Medications:   •  cholecalciferol (VITAMIN D3) 1000 units tablet, Take 1,000 Units by mouth 2 (Two) Times a Day., Disp: , Rfl:   •  citalopram (CeleXA) 20 MG tablet, Take 1 tablet by mouth Daily., Disp: 90 tablet, Rfl: 1  •  omeprazole (priLOSEC) 20 MG capsule, Take 20 mg by mouth Daily., Disp: , Rfl:   •  pravastatin (PRAVACHOL) 40 MG tablet, Take 1 tablet by mouth Every Night., Disp: 90 tablet, Rfl: 3  •  albuterol sulfate  (90 Base) MCG/ACT inhaler, Inhale 2 puffs As Needed for Wheezing or Shortness of Air., Disp: 1 inhaler, Rfl: 3    No problems with medications.    Allergies   Allergen Reactions   • Bacitracin-Neomycin-Polymyxin Itching   • Dilaudid [Hydromorphone Hcl] Other (See Comments)     Oxygen stats dropped & mental altered status   • Promethazine Hcl Anxiety   • Ace Inhibitors Other (See Comments)     Family history of intolerance.   • Blephamide [Sulfacetamide-Prednisolone] Rash   • Neosporin [Neomycin-Bacitracin Zn-Polymyx] Rash   • Other Rash     Neodecadron eye drop     • Phenergan [Promethazine] Anxiety   • Sulfa Antibiotics Rash       Review of Systems  GENERAL:  Active/slower with limits, speed, stamina for age.  Sleep is ok; no orthopnea. No fever now.  SKIN: No rash/skin lesion of concern:   ENDO:  No syncope, near or diaphoretic sweaty spells.  HEENT:  No recent head injury; or headache.   No vision change.  No significant hearing loss.  Ears without pain/drainage.  No sore throat.  Same usual/not that significant nasal/sinus congestion/drainage. No epistaxis.  CHEST: No chest wall tenderness or mass.  Same infrequent cough,  without wheeze.  No SOB; no hemoptysis.  CV: No exertional chest pain, palpitations, ankle edema.  GI: No heartburn, dysphagia.  No abdominal pain, diarrhea, constipation.  No rectal bleeding, or melena.    :  Voids without dysuria, or incontinence to completion.  ORTHO: No painful/swollen joints but various on /off sore beyond R shoulder today.  No sore neck or back.  No acute neck or back pain without recent injury.  NEURO: No dizziness, weakness of extremities.  No numbness/paresthesias.   PSYCH: No memory loss.  Mood good; not anxious, depressed but/and not suicidal.  Tries to tolerate stress .   Screening:  Gyne: none years  Mammogram: no TCC/epic: double masectomy  Bone density: 5.12.20/[facility]/bone density/LS -1.3 hip -0.7; doing very well; no change  Low dose CT chest: Tobacco-smoker/age 17/dc age 25 (<8p): NA  GI: Colon-polyp/Shieben/7.28.17/5y-expect call as will be due  Prostate: NA  Usual lab order  6m CBC  12m CBC, CMP, LIPID, TSH, Vit D,    Copy/paste function used for ROS/exam AND each area of these were reviewed, updated, confirmed and supplemented as needed.   Data reviewed:   Recent admit/ER/MD visits: last visit  Last cardiac testing:   Echo: none    Radiology considered:   No radiology results for the last 90 days.    Lab Results:  Results for orders placed or performed in visit on 05/06/22   Vitamin D 25 hydroxy    Specimen: Blood    Blood  Release to sylvie   Result Value Ref Range    25 Hydroxy, Vitamin D 60.5 30.0 - 100.0 ng/ml   TSH    Specimen: Blood    Blood  Release to sylvie   Result Value Ref Range    TSH 3.490 0.270 - 4.200 uIU/mL   Hemoglobin A1c    Specimen: Blood    Blood  Release to sylvie   Result Value Ref Range     Hemoglobin A1C 5.90 (H) 4.80 - 5.60 %   Lipid Panel w/ Chol/HDL Ratio    Specimen: Blood    Blood  Release to sylvie   Result Value Ref Range    Total Cholesterol 191 0 - 200 mg/dL    Triglycerides 123 0 - 150 mg/dL    HDL Cholesterol 54 40 - 60 mg/dL    VLDL Cholesterol Neno 22 5 - 40 mg/dL    LDL Chol Calc ([facility]) 115 (H) 0 - 100 mg/dL    Chol/HDL Ratio 3.54    Comprehensive metabolic panel    Specimen: Blood    Blood  Release to sylvie   Result Value Ref Range    Glucose 96 65 - 99 mg/dL    BUN 14 8 - 23 mg/dL    Creatinine 0.64 0.57 - 1.00 mg/dL    EGFR Result 90.6 >60.0 mL/min/1.73    BUN/Creatinine Ratio 21.9 7.0 - 25.0    Sodium 140 136 - 145 mmol/L    Potassium 4.2 3.5 - 5.2 mmol/L    Chloride 101 98 - 107 mmol/L    Total CO2 27.2 22.0 - 29.0 mmol/L    Calcium 9.7 8.6 - 10.5 mg/dL    Total Protein 6.6 6.0 - 8.5 g/dL    Albumin 4.80 3.50 - 5.20 g/dL    Globulin 1.8 gm/dL    A/G Ratio 2.7 g/dL    Total Bilirubin 0.4 0.0 - 1.2 mg/dL    Alkaline Phosphatase 69 39 - 117 U/L    AST (SGOT) 21 1 - 32 U/L    ALT (SGPT) 19 1 - 33 U/L   CBC & Differential    Specimen: Blood    Blood  Release to sylvie   Result Value Ref Range    WBC 5.43 3.40 - 10.80 10*3/mm3    RBC 5.14 3.77 - 5.28 10*6/mm3    Hemoglobin 14.2 12.0 - 15.9 g/dL    Hematocrit 44.3 34.0 - 46.6 %    MCV 86.2 79.0 - 97.0 fL    MCH 27.6 26.6 - 33.0 pg    MCHC 32.1 31.5 - 35.7 g/dL    RDW 13.2 12.3 - 15.4 %    Platelets 232 140 - 450 10*3/mm3    Neutrophil Rel % 60.9 42.7 - 76.0 %    Lymphocyte Rel % 27.4 19.6 - 45.3 %    Monocyte Rel % 7.2 5.0 - 12.0 %    Eosinophil Rel % 2.8 0.3 - 6.2 %    Basophil Rel % 1.1 0.0 - 1.5 %    Neutrophils Absolute 3.31 1.70 - 7.00 10*3/mm3    Lymphocytes Absolute 1.49 0.70 - 3.10 10*3/mm3    Monocytes Absolute 0.39 0.10 - 0.90 10*3/mm3    Eosinophils Absolute 0.15 0.00 - 0.40 10*3/mm3    Basophils Absolute 0.06 0.00 - 0.20 10*3/mm3    Immature Granulocyte Rel % 0.6 (H) 0.0 - 0.5 %    Immature Grans Absolute 0.03 0.00 - 0.05 10*3/mm3  "   nRBC 0.0 0.0 - 0.2 /100 WBC       A1C:  Lab Results - Last 18 Months   Lab Units 05/09/22  0718 10/11/21  0708 04/12/21  0707   HEMOGLOBIN A1C % 5.90* 5.55 5.90*     GLUCOSE:  Lab Results - Last 18 Months   Lab Units 05/09/22  0718 10/11/21  0708 04/12/21  0707   GLUCOSE mg/dL 96 99 105*     LIPID:  Lab Results - Last 18 Months   Lab Units 05/09/22  0718 04/12/21  0707   CHOLESTEROL mg/dL 191 191   LDL CHOL mg/dL 115* 113*   HDL CHOL mg/dL 54 57   TRIGLYCERIDES mg/dL 123 118     PSA:No results for input(s): PSA in the last 59869 hours.    CBC:  Lab Results - Last 18 Months   Lab Units 05/09/22  0718 10/11/21  0708 04/12/21  0707   WBC 10*3/mm3 5.43 5.20 4.97   HEMOGLOBIN g/dL 14.2 14.2 14.3   HEMATOCRIT % 44.3 45.5 43.1   PLATELETS 10*3/mm3 232 237 239      BMP/CMP:  Lab Results - Last 18 Months   Lab Units 05/09/22  0718 10/11/21  0708 04/12/21  0707   SODIUM mmol/L 140 141 140   POTASSIUM mmol/L 4.2 4.4 3.8   CHLORIDE mmol/L 101 102 102   TOTAL CO2 mmol/L 27.2 27.7 26.7   GLUCOSE mg/dL 96 99 105*   BUN mg/dL 14 9 14   CREATININE mg/dL 0.64 0.70 0.64   EGFR IF NONAFRICN AM mL/min/1.73  --  81 90   EGFR IF AFRICN AM mL/min/1.73  --  98 109   EGFR RESULT mL/min/1.73 90.6  --   --    CALCIUM mg/dL 9.7 9.7 10.0     HEPATIC:  Lab Results - Last 18 Months   Lab Units 05/09/22  0718 04/12/21  0707   ALT (SGPT) U/L 19 18   AST (SGOT) U/L 21 22   ALK PHOS U/L 69 60     Vit D:  Lab Results - Last 18 Months   Lab Units 05/09/22  0718 04/12/21  0707   VIT D 25 HYDROXY ng/ml 60.5 61.3     THYROID:  Lab Results - Last 18 Months   Lab Units 05/09/22  0718 04/12/21  0707   TSH uIU/mL 3.490 3.330       Objective   /78 (BP Location: Left arm, Patient Position: Sitting, Cuff Size: Adult)   Pulse 64   Temp 97.1 °F (36.2 °C) (Infrared)   Resp 18   Ht 162.6 cm (64\")   Wt 71.8 kg (158 lb 3.2 oz)   LMP  (LMP Unknown)   SpO2 98%   Breastfeeding No   BMI 27.15 kg/m²   Body mass index is 27.15 kg/m².    Recent Vitals       " 10/26/2021 1/11/2022 5/12/2022       BP: -- -- 118/78     Pulse: 71 84 64     Temp: -- 97.8 °F (36.6 °C) 97.1 °F (36.2 °C)     Weight: 72.9 kg (160 lb 12.8 oz) -- 71.8 kg (158 lb 3.2 oz)     BMI (Calculated): 27.6 -- 27.1         Physical Exam  GENERAL:  Well nourished/developed in no acute distress.   SKIN: Turgor excellent, without wound, rash, lesion  HEENT: Normal cephalic without trauma.  Pupils equal round reactive to light. Extraocular motions full without nystagmus.   External canals nonobstructive nontender without reddness. Tymphatic membranes jenny with celia structures intact.   Oral cavity without growths, exudates, and moist.  Posterior pharynx without mass, obstruction, redness.  No thyromegaly, mass, tenderness, lymphadenopathy and supple.  CV: Regular rhythm.  No murmur, gallop,  edema. Posterior pulses intact.  No carotid bruits.  CHEST: No chest wall tenderness or mass.   LUNGS: Symmetric motion with clear to auscultation.    ABD: Soft, nontender without mass.   ORTHO: Symmetric extremities without swelling/point tenderness beyond pain lateral deltoid R shoulder 90deg/beyond adduction.  Full gross range of motion otherwise/present.   NEURO: CN 2-12 grossly intact.  Symmetric facies and UE/LE. 3/5 strength throughout.  Nonfocal use extremities. Speech mildly hoarse.   PSYCH: Oriented x 3.  Pleasant calm, well kept.  Purposeful/directed conservation with intact short/long gross memory.       Assessment & Plan     1. Vitamin D deficiency    2. Mixed hyperlipidemia    3. Gastroesophageal reflux disease without esophagitis    4. Bilateral malignant neoplasm of breast in female, unspecified estrogen receptor status, unspecified site of breast (HCC)    5. Depression, unspecified depression type    6. Chronic cough-reflux    7. History of COVID-19    8. Acute pain of right shoulder    9. Vaccine counseling    10. Elevated fasting glucose-4.3.19        Discussions/medical decisions/reviews:  BP ok  Other  vitals ok  DM/BS ok; better past  Lipid ok  PSA NA  CBC ok  Renal ok  Liver ok  Vit D ok; no need to be this high  Thyroid ok    Still think she had covid past  dont recommend more J&J-moderna/pfizer  Expect likely covid vaccine with flu; yearly  Medrol dose pack for shoulder; steroid advice  Next visit here if she wants to travel; or will help with transfer there    Medical decision issues:   Data review above:   Rx: reviewed and decisions:   Visit today involved chronic significant medical problems or differentials and/or intensive drug monitoring: ie potential to cause serious morbidity or death:   Rx changes: none  New Medications Ordered This Visit   Medications   • methylPREDNISolone (MEDROL) 4 MG dose pack     Sig: Take as directed on package instructions.     Dispense:  1 each     Refill:  0     Pharmacist-tell patient When using steroids Do not use anti-inflammatories such as Motrin/ibuprofen, Alleve/naprosyn, Mobic and like medications     Orders placed:   LAB/Testing/Referrals: reviewed/orders:   Today:   No orders of the defined types were placed in this encounter.    Chronic/recurrent labs above or change to:   same     Screening reviewed/updated -    Immunization History   Administered Date(s) Administered   • COVID-19 (GISELA) 03/17/2021, 12/14/2021   • Fluzone High-Dose 65+yrs 10/19/2021   • Tdap 10/19/2021     Vaccine reviewed: today none; later we advised/reaffirmed our support/suggestion for staying complete with covid- covid boosters, seasonal flu/yearly and any missing vaccine from list we supplied; we suggest contact with local health department office to review missing/needed vaccines and then bring nursing documentation for these vaccines to this office.     Health maintenance:   Body mass index is 27.15 kg/m².  BMI is >= 25 and < 30. (Overweight) The following options were offered after discussion: exercise counseling/recommendations and nutrition counseling/recommendations      Tobacco use  "reviewed:   Nikki Santillan  reports that she quit smoking about 53 years ago. Her smoking use included cigarettes. She started smoking about 60 years ago. She smoked 0.50 packs per day. She has never used smokeless tobacco..     Patient Instructions     Medicare/insurances offer certain visits called \"wellness/annual\" that allows for time to deal with and  review the many aspects of \"being well\" that just might not get mentioned during other visits with your doctor through the year.  This includes things like reviews of health screenings (mammograms, various labs),  weight, exercise, vaccines for just a few examples.      In order to help you with this we wish to make you aware of a few things for you to consider:    1. Advanced directives.  These are documents used to help direct your care if your health/situation should reach a point that you cannot make your own decisions.  While it is likely you do not currently have a need for these documents now; it is something that we all might face at any time.   The hand outs you are being given today are simply for you to review and use to learn more about these documents and consider them as you wish.      2. Vaccines: Certain vaccines are important after age 50, 60, and 65 and some health situations (for example COPD), require even boosters beyond age 65.  We are happy to review with you your vaccine status and vaccines that might be needed for you at this point:      a. Tetanus.   Like anyone this needs to given every 10 years; sooner for/with lacerations/wounds.   Likely when getting this booster it needs to be a tetanus called Tdap (tetanus mixed with diptheria and pertussis).   Years ago you had this vaccine.  We now know we can lose our immunity to pertussis (a part of this vaccine) and run a risk of catching this.  Now only would this make us ill; but more importantly we can spread this to very young children (and for them it can be a much more dangerous illness).   " We call this the grandparent vaccine for this reason.     b. Pneumonia (strept).   This comes now with two brands.   It is recommended to take pneumovax first; and a year later take the cousin prevnar.  Even if you have had these before; we need to review when and your current health situation/s as you may need boosters and even recently the CDC has made recent/new recommendations for pneumovax.      c. Shingles.  You do not want to catch shingles.  Though you will recover from this; the pain associated with shingles can be severe.  Even if you have had the now older zostavax, or have had shingles; it is recommended you still get the Shingrix (the new vaccine just available early 2018 shingles vaccine).  A new shingles vaccine (a shot to lower your chance of catching shingles) is now available (shingrix).  This vaccine is the second vaccine created for this purpose; (we have had zostavax for years).  Shingrix provides a much better and longer immunity for shingles than zostavax.  For this and other reasons Shingrix can be started at age 50.  If you have had zostavax in the past; you can still take Shingrix.      This vaccine is not paid for in a doctor's office by medicare, medicaid and probably most insurances.  Like zostavax; this is covered in drug stores.  This is a vaccine that if you chose to get you need to get at a drug store that gives vaccines (like [facility], [facility] and [facility].      d. Yearly flu vaccine given from September through April each year (there is a special vaccine for those over 65).     e. Travel vaccines:  If you are one to do international travel; be sure and ask us for any particular unusual vaccines you may need.     f.  Miscellaneous:  If you have certain health situations/disease you may need specific/particular vaccines not give to the general public.     g.  Covid: currently recommended everyone over 5.  The brands Pfizer/Moderna are for 3 total shots as  immunity will wane from less than this.  Dialogic/Dialogic has a version that comes with recommendations for an initial vaccine and booster after.  I no longer recommend J&J as a first choice as Pfizer/Moderna are readily available.  If you have had an initial J&J I recommend you booster with Moderna.   I strongly recommend covid vaccination; being unvaccinated or partially vaccinated carries real risk for disease and even death.     Because of many restrictions on this office always having all the above vaccines; you may be advised to work with your local health department to keep up with your individual vaccine needs.    The vaccines we have on record for you include:   Immunization History   Administered Date(s) Administered   • COVID-19 (GISELA) 03/17/2021, 12/14/2021   • Fluzone High-Dose 65+yrs 10/19/2021   • Tdap 10/19/2021       If you have record of other vaccines and want them to show in your chart here; please talk to our nurses about having your vaccine record updated.     3. Exercise: regular cardio exercise something everyone should consider and try to do; even if health limitations (ie find that exercise UE/LE/cardio that they can tolerate).   Normal weight a goal for everyone (as we discussed)    4. Healthy diet helpful for weight management, illness prevention.     5. If over 50-screening exams include men PSA/rectal exam, women mammograms, and everyone colonoscopy screening for colon cancer.    6. If you use tobacco of any kind or e-products you should stop. We are providing you some information to consider that could make this process easier.      ##################################    When using steroids  A. Do not use anti-inflammatories such as Motrin/ibuprofen, Alleve/naprosyn, Mobic and like medications  B. Stay on your stomach medicines for ulcer/like (like prilosec, protonix, nexium)  C. If you are diabetic or pre-diabetic; it will raise your blood sugar.  For most people this will be a  minimal and very tolerated elevation.  If you check your blood sugars (or have the ability to check) you should consider for the first days of the steroid Rx to check your blood sugar more often (1-3/days).  Call if you see blood sugars over 350.  Being more strict on your diabetic diet while using the steroid will help.     If using insulin: add sliding scale to your program:     Based on BS give this much insulin EXTRA to any other insulin/diabetic medication being used.     150-199 2 units  200-249 3 units  250-299 4 units  300-349 5 units  350-400 6 units  Greater 400 7 units            Follow up: Return for lab 6m; then lab/Dr Carrillo 12m.  Future Appointments   Date Time Provider Department Center   11/14/2022  9:00 AM [facility]

## 2022-05-17 ENCOUNTER — TELEPHONE (OUTPATIENT)
Dept: FAMILY MEDICINE CLINIC | Facility: CLINIC | Age: 77
End: 2022-05-17

## 2022-05-17 NOTE — TELEPHONE ENCOUNTER
Not expecting it to be steroid related but ? Sinus  Sinus areas or head area      Pt called and left voicemail that she has been on steroids for couple days and now she has a headache that is on/off on the right side and she asked what dr cassidy suggest she do or take

## 2022-05-18 NOTE — TELEPHONE ENCOUNTER
Pt stated that it is stress related it was on the right side of head and going down her neck but the pain has al stopped now so she is all ok

## 2022-07-06 ENCOUNTER — TELEPHONE (OUTPATIENT)
Dept: FAMILY MEDICINE CLINIC | Facility: CLINIC | Age: 77
End: 2022-07-06

## 2022-07-06 NOTE — TELEPHONE ENCOUNTER
Called pt and does not want to have a pain pill, I want to get a shot in my shoulder, he done this before    Appt made

## 2022-07-06 NOTE — TELEPHONE ENCOUNTER
May have steroid injection  May have refer to ortho  May have MRI shoulder  May have norco/equal (think since not IV dilaudid will do ok)    Caller: Nikki Santillan    Relationship to patient: Self    Best call back number: [phone]    Patient states she still has shoulder pain. The steroid did not help. She states it is a 4, as far as pain. She has been taking 800 mg of Ibuprofen, but the pain still persists. She is wondering what else can be prescribed.       [id number] - [place], [place] - [address] AT [facility] [address] - [phone]  - [phone] FX

## 2022-07-12 ENCOUNTER — OFFICE VISIT (OUTPATIENT)
Dept: FAMILY MEDICINE CLINIC | Facility: CLINIC | Age: 77
End: 2022-07-12

## 2022-07-12 VITALS
BODY MASS INDEX: 26.98 KG/M2 | WEIGHT: 158 LBS | HEIGHT: 64 IN | TEMPERATURE: 96.8 F | OXYGEN SATURATION: 95 % | SYSTOLIC BLOOD PRESSURE: 136 MMHG | HEART RATE: 88 BPM | DIASTOLIC BLOOD PRESSURE: 84 MMHG | RESPIRATION RATE: 16 BRPM

## 2022-07-12 DIAGNOSIS — M25.511 RIGHT SHOULDER PAIN, UNSPECIFIED CHRONICITY: ICD-10-CM

## 2022-07-12 PROCEDURE — 20610 DRAIN/INJ JOINT/BURSA W/O US: CPT | Performed by: FAMILY MEDICINE

## 2022-07-12 RX ORDER — TRIAMCINOLONE ACETONIDE 40 MG/ML
40 INJECTION, SUSPENSION INTRA-ARTICULAR; INTRAMUSCULAR ONCE
Status: COMPLETED | OUTPATIENT
Start: 2022-07-12 | End: 2022-07-12

## 2022-07-12 RX ADMIN — TRIAMCINOLONE ACETONIDE 40 MG: 40 INJECTION, SUSPENSION INTRA-ARTICULAR; INTRAMUSCULAR at 13:37

## 2022-07-12 NOTE — PROGRESS NOTES
"Subjective   Nikki Santillan is a 77 y.o. female presenting with chief complaint of:   Chief Complaint   Patient presents with   • Shoulder Pain     \"My right shoulder, I want to get a shot in it\"       History of Present Illness :  Alone.  Here for primarily an acute issue today; continued pain R shoulder.  No injury/? Overuse with her move.  Worse to move.  Had before either R or L; injection helped.   Has multiple chronic problems to consider that might have a bearing on today's issues; not an interval appointment.       Chronic/acute problems reviewed today:   1. Right shoulder pain, unspecified chronicity      Has an/another acute issue today: none.    The following portions of the patient's history were reviewed and updated as appropriate: allergies, current medications, past family history, past medical history, past social history, past surgical history and problem list.      Current Outpatient Medications:   •  cholecalciferol (VITAMIN D3) 1000 units tablet, Take 1,000 Units by mouth 2 (Two) Times a Day., Disp: , Rfl:   •  citalopram (CeleXA) 20 MG tablet, Take 1 tablet by mouth Daily., Disp: 90 tablet, Rfl: 1  •  omeprazole (priLOSEC) 20 MG capsule, Take 20 mg by mouth Daily., Disp: , Rfl:   •  pravastatin (PRAVACHOL) 40 MG tablet, Take 1 tablet by mouth Every Night., Disp: 90 tablet, Rfl: 3  •  albuterol sulfate  (90 Base) MCG/ACT inhaler, Inhale 2 puffs As Needed for Wheezing or Shortness of Air., Disp: 1 inhaler, Rfl: 3    Allergies   Allergen Reactions   • Bacitracin-Neomycin-Polymyxin Itching   • Dilaudid [Hydromorphone Hcl] Other (See Comments)     Oxygen stats dropped & mental altered status   • Promethazine Hcl Anxiety   • Ace Inhibitors Other (See Comments)     Family history of intolerance.   • Blephamide [Sulfacetamide-Prednisolone] Rash   • Neosporin [Neomycin-Bacitracin Zn-Polymyx] Rash   • Other Rash     Neodecadron eye drop     • Phenergan [Promethazine] Anxiety   • Sulfa Antibiotics Rash "       ROS  GENERAL:  Active/slower with limits, speed, samni for age and pain with this pain. Sleep is ok. No fever now/recent  SKIN: No rashes around the joint injected.    CV: No chest pain, palpatations, ankle edema.  ORTHO: No painful/swollen joints but various on /off sore.  .   NEURO: No dizziness, weakness of extremities.  No numbness/parethesias.     Results for orders placed or performed in visit on 05/06/22   Vitamin D 25 hydroxy    Specimen: Blood    Blood  Release to sylvie   Result Value Ref Range    25 Hydroxy, Vitamin D 60.5 30.0 - 100.0 ng/ml   TSH    Specimen: Blood    Blood  Release to sylvie   Result Value Ref Range    TSH 3.490 0.270 - 4.200 uIU/mL   Hemoglobin A1c    Specimen: Blood    Blood  Release to sylvie   Result Value Ref Range    Hemoglobin A1C 5.90 (H) 4.80 - 5.60 %   Lipid Panel w/ Chol/HDL Ratio    Specimen: Blood    Blood  Release to sylvie   Result Value Ref Range    Total Cholesterol 191 0 - 200 mg/dL    Triglycerides 123 0 - 150 mg/dL    HDL Cholesterol 54 40 - 60 mg/dL    VLDL Cholesterol Neno 22 5 - 40 mg/dL    LDL Chol Calc (NIH) 115 (H) 0 - 100 mg/dL    Chol/HDL Ratio 3.54    Comprehensive metabolic panel    Specimen: Blood    Blood  Release to sylvie   Result Value Ref Range    Glucose 96 65 - 99 mg/dL    BUN 14 8 - 23 mg/dL    Creatinine 0.64 0.57 - 1.00 mg/dL    EGFR Result 90.6 >60.0 mL/min/1.73    BUN/Creatinine Ratio 21.9 7.0 - 25.0    Sodium 140 136 - 145 mmol/L    Potassium 4.2 3.5 - 5.2 mmol/L    Chloride 101 98 - 107 mmol/L    Total CO2 27.2 22.0 - 29.0 mmol/L    Calcium 9.7 8.6 - 10.5 mg/dL    Total Protein 6.6 6.0 - 8.5 g/dL    Albumin 4.80 3.50 - 5.20 g/dL    Globulin 1.8 gm/dL    A/G Ratio 2.7 g/dL    Total Bilirubin 0.4 0.0 - 1.2 mg/dL    Alkaline Phosphatase 69 39 - 117 U/L    AST (SGOT) 21 1 - 32 U/L    ALT (SGPT) 19 1 - 33 U/L   CBC & Differential    Specimen: Blood    Blood  Release to sylvie   Result Value Ref Range    WBC 5.43 3.40 - 10.80 10*3/mm3    RBC 5.14 3.77 -  "5.28 10*6/mm3    Hemoglobin 14.2 12.0 - 15.9 g/dL    Hematocrit 44.3 34.0 - 46.6 %    MCV 86.2 79.0 - 97.0 fL    MCH 27.6 26.6 - 33.0 pg    MCHC 32.1 31.5 - 35.7 g/dL    RDW 13.2 12.3 - 15.4 %    Platelets 232 140 - 450 10*3/mm3    Neutrophil Rel % 60.9 42.7 - 76.0 %    Lymphocyte Rel % 27.4 19.6 - 45.3 %    Monocyte Rel % 7.2 5.0 - 12.0 %    Eosinophil Rel % 2.8 0.3 - 6.2 %    Basophil Rel % 1.1 0.0 - 1.5 %    Neutrophils Absolute 3.31 1.70 - 7.00 10*3/mm3    Lymphocytes Absolute 1.49 0.70 - 3.10 10*3/mm3    Monocytes Absolute 0.39 0.10 - 0.90 10*3/mm3    Eosinophils Absolute 0.15 0.00 - 0.40 10*3/mm3    Basophils Absolute 0.06 0.00 - 0.20 10*3/mm3    Immature Granulocyte Rel % 0.6 (H) 0.0 - 0.5 %    Immature Grans Absolute 0.03 0.00 - 0.05 10*3/mm3    nRBC 0.0 0.0 - 0.2 /100 WBC       Lab Results:  CBC:  Lab Results - Last 18 Months   Lab Units 05/09/22  0718 10/11/21  0708 04/12/21  0707   WBC 10*3/mm3 5.43 5.20 4.97   HEMATOCRIT % 44.3 45.5 43.1     CMP:  Lab Results - Last 18 Months   Lab Units 05/09/22  0718 10/11/21  0708 04/12/21  0707   SODIUM mmol/L 140 141 140   CHLORIDE mmol/L 101 102 102   TOTAL CO2 mmol/L 27.2 27.7 26.7   BUN mg/dL 14 9 14   CREATININE mg/dL 0.64 0.70 0.64   EGFR IF NONAFRICN AM mL/min/1.73  --  81 90   EGFR IF AFRICN AM mL/min/1.73  --  98 109   CALCIUM mg/dL 9.7 9.7 10.0     HEPATIC:  Lab Results - Last 18 Months   Lab Units 05/09/22  0718 04/12/21  0707   ALT (SGPT) U/L 19 18     THYROID:  Lab Results - Last 18 Months   Lab Units 05/09/22  0718 04/12/21  0707   TSH uIU/mL 3.490 3.330     A1C:  Lab Results - Last 18 Months   Lab Units 05/09/22  0718 10/11/21  0708 04/12/21  0707   HEMOGLOBIN A1C % 5.90* 5.55 5.90*         Objective   /84 (BP Location: Left arm, Patient Position: Sitting, Cuff Size: Large Adult)   Pulse 88   Temp 96.8 °F (36 °C) (Infrared)   Resp 16   Ht 162.6 cm (64\")   Wt 71.7 kg (158 lb)   LMP  (LMP Unknown)   SpO2 95%   Breastfeeding No   BMI 27.12 " "kg/m²     GENERAL:  Well nourished/developed in no acute distress.   SKIN: Turgor excellent, without wound, rash, lesion; especially near area of injection.  HEENT: Normal cephalic without trauma.  Pupils equal round reactive to light. Extraocular motions full without nystagmus.    CV: Regular rhythm.  No murmur, gallop,  edema.   LUNGS: Symmetric motion with clear to auscultation.   ORTHO: Symmetric extremities without swelling/point tenderness; joint injected.  Full gross range of motion; joint injected but R shoulder anteriorly sore with abduction.  NEURO: CN 2-12 grossly intact.  Symmetric facies.  Nonfocal use extremities. Speech clear.    PSYCH: Oriented x 3.  Pleasant calm, well kept.  Purposeful/directed conservation with intact short/long gross memory.    Assessment & Plan     1. Right shoulder pain, unspecified chronicity      The risks and benefits of the shoulder injection were explained to the patient.  He was warned of pain during the procedure and after, the possibilty of infection, the possibility of no improvement and/or poor result, further expense of visits, referrals, antibiotics, hospitalization, surgery and possible bruising. He expressed verbal understanding and agreed to proceed.    PROCEDURE:  Followed the procedure from \"A Practical Guide to Joint and Soft Tissue Injection and Aspiration\" Derian Wilkerson 2005 pages 19-22.  The patient was placed in a seated position and the subacromial aspect of the R shoulder was cleansed with alcohol and prepped with Betadine.    Using a posterior approach and sterile technique, local anesthesia was obtained using 1cc 2% Xylocaine without epinephrine.  When adequate anesthesia was achieved, the subacromial space was injected with a preparation of 1cc 2% Xylocaine and 1cc Kenalog 40mg/cc using a 25g 1 1/2 inch needle.  The solution flowed smoothly into the space.  The needle was withdrawn and a sterile adhesive bandage was applied.  The shoulder was taken " through a full range of motion to distribute the steroid solution throughout the shoulder joint.    The patient tolerated the procedure well and was advised to avoid excessive use of the shoulder over the next two weeks.  He was asked to immediately report fever, increasing pain or drainage from the injection site.      Rx: reviewed.  Any other changes above and:   LAB: reviewed/above.  Orders above and:   Gradually increase use of the joint injected today; continue/consider adding physical therapy if not improved by todays shot to tolerated/improved status.   There are no Patient Instructions on file for this visit.    BMI is >= 25 and <30. (Overweight) The following options were offered after discussion;: none needed    Nikki Santillan  reports that she quit smoking about 53 years ago. Her smoking use included cigarettes. She started smoking about 60 years ago. She smoked 0.50 packs per day. She has never used smokeless tobacco..            Follow up: No follow-ups on file.  Future Appointments   Date Time Provider Department Center   8/1/2022  1:30 PM Saba Vogt APRN [facility]   11/14/2022  9:00 AM [facility]

## 2022-08-01 ENCOUNTER — OFFICE VISIT (OUTPATIENT)
Dept: GASTROENTEROLOGY | Facility: CLINIC | Age: 77
End: 2022-08-01

## 2022-08-01 VITALS
HEART RATE: 62 BPM | SYSTOLIC BLOOD PRESSURE: 152 MMHG | WEIGHT: 160 LBS | TEMPERATURE: 96.4 F | OXYGEN SATURATION: 96 % | BODY MASS INDEX: 27.31 KG/M2 | HEIGHT: 64 IN | DIASTOLIC BLOOD PRESSURE: 76 MMHG

## 2022-08-01 DIAGNOSIS — F32.A DEPRESSION, UNSPECIFIED DEPRESSION TYPE: Chronic | ICD-10-CM

## 2022-08-01 DIAGNOSIS — E78.5 HYPERLIPIDEMIA, UNSPECIFIED HYPERLIPIDEMIA TYPE: Chronic | ICD-10-CM

## 2022-08-01 DIAGNOSIS — F41.9 ANXIETY: Chronic | ICD-10-CM

## 2022-08-01 DIAGNOSIS — Z86.010 HISTORY OF ADENOMATOUS POLYP OF COLON: Primary | ICD-10-CM

## 2022-08-01 PROCEDURE — S0260 H&P FOR SURGERY: HCPCS | Performed by: NURSE PRACTITIONER

## 2022-08-01 RX ORDER — PRAVASTATIN SODIUM 40 MG
40 TABLET ORAL NIGHTLY
Qty: 90 TABLET | Refills: 3 | Status: SHIPPED | OUTPATIENT
Start: 2022-08-01

## 2022-08-01 RX ORDER — CITALOPRAM 20 MG/1
20 TABLET ORAL DAILY
Qty: 90 TABLET | Refills: 3 | Status: SHIPPED | OUTPATIENT
Start: 2022-08-01

## 2022-08-01 RX ORDER — SOD SULF/POT CHLORIDE/MAG SULF 1.479 G
1 TABLET ORAL TAKE AS DIRECTED
Qty: 24 TABLET | Refills: 0 | Status: ON HOLD | COMMUNITY
Start: 2022-08-01 | End: 2022-08-22

## 2022-08-01 RX ORDER — SOD SULF/POT CHLORIDE/MAG SULF 1.479 G
1 TABLET ORAL TAKE AS DIRECTED
Qty: 24 TABLET | Refills: 0 | Status: SHIPPED | OUTPATIENT
Start: 2022-08-01 | End: 2022-08-01 | Stop reason: SDUPTHER

## 2022-08-01 NOTE — PROGRESS NOTES
[facility]    Primary Physician Gael Carrillo MD    8/1/2022    Nikki Santillan   1945      Chief Complaint   Patient presents with   • Colonoscopy       Subjective     HPI    Nikki Santillan is a 77 y.o. female who presents as a referral for preventative maintenance. She has no complaints of nausea or vomiting. No change in bowels. No wt loss. No BRBPR. No melena. No abdominal pain.       COLONOSCOPY (07/28/2017 10:01)  tubular adenomatous      There is not a  family history of colon polyps/colon cancer.       Past Medical History:   Diagnosis Date   • Cancer (HCC)     breast cancer x 3 2003 - 2012 was last mastectomy   • Colon polyp    • Hyperlipidemia        Past Surgical History:   Procedure Laterality Date   • APPENDECTOMY     • COLONOSCOPY  12/16/2011   • COLONOSCOPY N/A 7/28/2017    Procedure: COLONOSCOPY WITH ANESTHESIA;  Surgeon: Chapin Valverde MD;  Location: [facility] ENDOSCOPY;  Service:    • COLONOSCOPY  2002   • DILATION AND CURETTAGE, DIAGNOSTIC / THERAPEUTIC     • MASTECTOMY      lumpectomy 2003 right and had xrt,  11/2004 right ,1/2012 left,    • TONSILLECTOMY         Outpatient Medications Marked as Taking for the 8/1/22 encounter (Office Visit) with Saba Vogt APRN   Medication Sig Dispense Refill   • albuterol sulfate  (90 Base) MCG/ACT inhaler Inhale 2 puffs As Needed for Wheezing or Shortness of Air. (Patient taking differently: Inhale 2 puffs As Needed for Wheezing or Shortness of Air (rare).) 1 inhaler 3   • cholecalciferol (VITAMIN D3) 1000 units tablet Take 1,000 Units by mouth 2 (Two) Times a Day.     • citalopram (CeleXA) 20 MG tablet Take 1 tablet by mouth Daily. 90 tablet 1   • omeprazole (priLOSEC) 20 MG capsule Take 20 mg by mouth Daily.     • pravastatin (PRAVACHOL) 40 MG tablet Take 1 tablet by mouth Every Night. 90 tablet 3       Allergies   Allergen Reactions   • Bacitracin-Neomycin-Polymyxin Itching   • Dilaudid [Hydromorphone Hcl] Other  (See Comments)     Oxygen stats dropped & mental altered status   • Promethazine Hcl Anxiety   • Ace Inhibitors Other (See Comments)     Family history of intolerance.   • Blephamide [Sulfacetamide-Prednisolone] Rash   • Neosporin [Neomycin-Bacitracin Zn-Polymyx] Rash   • Other Rash     Neodecadron eye drop     • Phenergan [Promethazine] Anxiety   • Sulfa Antibiotics Rash       Social History     Socioeconomic History   • Marital status:      Spouse name:    • Number of children: 2   • Years of education: 13   Tobacco Use   • Smoking status: Former Smoker     Packs/day: 0.50     Types: Cigarettes     Start date: 1962     Quit date: 1969     Years since quittin.5   • Smokeless tobacco: Never Used   Substance and Sexual Activity   • Alcohol use: No   • Drug use: No   • Sexual activity: Defer       Family History   Problem Relation Age of Onset   • Breast cancer Mother    • Heart valve disorder Mother    • Brain cancer Father    • Hypertension Sister    • Hyperlipidemia Sister    • Breast cancer Sister    • Hyperlipidemia Sister    • Hypertension Sister    • Breast cancer Maternal Grandmother    • Colon cancer Neg Hx    • Colon polyps Neg Hx        Review of Systems   Constitutional: Negative for chills, fever and unexpected weight change.   Respiratory: Negative for shortness of breath and wheezing.    Cardiovascular: Negative for chest pain and palpitations.   Gastrointestinal: Negative for abdominal distention, abdominal pain, anal bleeding, blood in stool, constipation, diarrhea, nausea and vomiting.       Objective     Vitals:    22 1314   BP: 152/76   Pulse: 62   Temp: 96.4 °F (35.8 °C)   SpO2: 96%         22  1314   Weight: 72.6 kg (160 lb)     Body mass index is 27.46 kg/m².    Physical Exam  Vitals reviewed.   Constitutional:       General: She is not in acute distress.  Cardiovascular:      Rate and Rhythm: Normal rate and regular rhythm.      Heart sounds: Normal heart  sounds.   Pulmonary:      Effort: Pulmonary effort is normal.      Breath sounds: Normal breath sounds.   Abdominal:      General: Bowel sounds are normal. There is no distension.      Palpations: Abdomen is soft.      Tenderness: There is no abdominal tenderness.   Skin:     General: Skin is warm and dry.   Neurological:      Mental Status: She is alert.         Imaging Results (Most Recent)     None          Assessment & Plan     Diagnoses and all orders for this visit:    1. History of adenomatous polyp of colon (Primary)  -     Case Request; Standing  -     Case Request    Other orders  -     Follow Anesthesia Guidelines / Protocol; Future  -     Obtain Informed Consent; Future  -     Discontinue: Sodium Sulfate-Mag Sulfate-KCl (Sutab) 5771-472-432 MG tablet; Take 1 bottle by mouth Take As Directed.  Dispense: 24 tablet; Refill: 0  -     Sodium Sulfate-Mag Sulfate-KCl (Sutab) 9370-292-501 MG tablet; Take 1 bottle by mouth Take As Directed.  Dispense: 24 tablet; Refill: 0          Plan for colonoscopy.                  COLONOSCOPY WITH ANESTHESIA (N/A)  All risks, benefits, alternatives, and indications of colonoscopy procedure have been discussed with the patient. Risks to include perforation of the colon requiring possible surgery or colostomy, risk of bleeding from biopsies or removal of colon tissue, possibility of missing a colon polyp or cancer, or adverse drug reaction.  Benefits to include the diagnosis and management of disease of the colon and rectum. Alternatives to include barium enema, radiographic evaluation, lab testing or no intervention. Pt verbalizes understanding and agrees.       GIOVANNA Denton

## 2022-08-01 NOTE — H&P (VIEW-ONLY)
[facility]    Primary Physician Gael Carrillo MD    8/1/2022    Nikki Santillan   1945      Chief Complaint   Patient presents with   • Colonoscopy       Subjective     HPI    Nikki Santillan is a 77 y.o. female who presents as a referral for preventative maintenance. She has no complaints of nausea or vomiting. No change in bowels. No wt loss. No BRBPR. No melena. No abdominal pain.       COLONOSCOPY (07/28/2017 10:01)  tubular adenomatous      There is not a  family history of colon polyps/colon cancer.       Past Medical History:   Diagnosis Date   • Cancer (HCC)     breast cancer x 3 2003 - 2012 was last mastectomy   • Colon polyp    • Hyperlipidemia        Past Surgical History:   Procedure Laterality Date   • APPENDECTOMY     • COLONOSCOPY  12/16/2011   • COLONOSCOPY N/A 7/28/2017    Procedure: COLONOSCOPY WITH ANESTHESIA;  Surgeon: Chapin Valverde MD;  Location: [facility] ENDOSCOPY;  Service:    • COLONOSCOPY  2002   • DILATION AND CURETTAGE, DIAGNOSTIC / THERAPEUTIC     • MASTECTOMY      lumpectomy 2003 right and had xrt,  11/2004 right ,1/2012 left,    • TONSILLECTOMY         Outpatient Medications Marked as Taking for the 8/1/22 encounter (Office Visit) with Saba Vogt APRN   Medication Sig Dispense Refill   • albuterol sulfate  (90 Base) MCG/ACT inhaler Inhale 2 puffs As Needed for Wheezing or Shortness of Air. (Patient taking differently: Inhale 2 puffs As Needed for Wheezing or Shortness of Air (rare).) 1 inhaler 3   • cholecalciferol (VITAMIN D3) 1000 units tablet Take 1,000 Units by mouth 2 (Two) Times a Day.     • citalopram (CeleXA) 20 MG tablet Take 1 tablet by mouth Daily. 90 tablet 1   • omeprazole (priLOSEC) 20 MG capsule Take 20 mg by mouth Daily.     • pravastatin (PRAVACHOL) 40 MG tablet Take 1 tablet by mouth Every Night. 90 tablet 3       Allergies   Allergen Reactions   • Bacitracin-Neomycin-Polymyxin Itching   • Dilaudid [Hydromorphone Hcl] Other  (See Comments)     Oxygen stats dropped & mental altered status   • Promethazine Hcl Anxiety   • Ace Inhibitors Other (See Comments)     Family history of intolerance.   • Blephamide [Sulfacetamide-Prednisolone] Rash   • Neosporin [Neomycin-Bacitracin Zn-Polymyx] Rash   • Other Rash     Neodecadron eye drop     • Phenergan [Promethazine] Anxiety   • Sulfa Antibiotics Rash       Social History     Socioeconomic History   • Marital status:      Spouse name:    • Number of children: 2   • Years of education: 13   Tobacco Use   • Smoking status: Former Smoker     Packs/day: 0.50     Types: Cigarettes     Start date: 1962     Quit date: 1969     Years since quittin.5   • Smokeless tobacco: Never Used   Substance and Sexual Activity   • Alcohol use: No   • Drug use: No   • Sexual activity: Defer       Family History   Problem Relation Age of Onset   • Breast cancer Mother    • Heart valve disorder Mother    • Brain cancer Father    • Hypertension Sister    • Hyperlipidemia Sister    • Breast cancer Sister    • Hyperlipidemia Sister    • Hypertension Sister    • Breast cancer Maternal Grandmother    • Colon cancer Neg Hx    • Colon polyps Neg Hx        Review of Systems   Constitutional: Negative for chills, fever and unexpected weight change.   Respiratory: Negative for shortness of breath and wheezing.    Cardiovascular: Negative for chest pain and palpitations.   Gastrointestinal: Negative for abdominal distention, abdominal pain, anal bleeding, blood in stool, constipation, diarrhea, nausea and vomiting.       Objective     Vitals:    22 1314   BP: 152/76   Pulse: 62   Temp: 96.4 °F (35.8 °C)   SpO2: 96%         22  1314   Weight: 72.6 kg (160 lb)     Body mass index is 27.46 kg/m².    Physical Exam  Vitals reviewed.   Constitutional:       General: She is not in acute distress.  Cardiovascular:      Rate and Rhythm: Normal rate and regular rhythm.      Heart sounds: Normal heart  sounds.   Pulmonary:      Effort: Pulmonary effort is normal.      Breath sounds: Normal breath sounds.   Abdominal:      General: Bowel sounds are normal. There is no distension.      Palpations: Abdomen is soft.      Tenderness: There is no abdominal tenderness.   Skin:     General: Skin is warm and dry.   Neurological:      Mental Status: She is alert.         Imaging Results (Most Recent)     None          Assessment & Plan     Diagnoses and all orders for this visit:    1. History of adenomatous polyp of colon (Primary)  -     Case Request; Standing  -     Case Request    Other orders  -     Follow Anesthesia Guidelines / Protocol; Future  -     Obtain Informed Consent; Future  -     Discontinue: Sodium Sulfate-Mag Sulfate-KCl (Sutab) 0633-534-334 MG tablet; Take 1 bottle by mouth Take As Directed.  Dispense: 24 tablet; Refill: 0  -     Sodium Sulfate-Mag Sulfate-KCl (Sutab) 5463-830-022 MG tablet; Take 1 bottle by mouth Take As Directed.  Dispense: 24 tablet; Refill: 0          Plan for colonoscopy.                  COLONOSCOPY WITH ANESTHESIA (N/A)  All risks, benefits, alternatives, and indications of colonoscopy procedure have been discussed with the patient. Risks to include perforation of the colon requiring possible surgery or colostomy, risk of bleeding from biopsies or removal of colon tissue, possibility of missing a colon polyp or cancer, or adverse drug reaction.  Benefits to include the diagnosis and management of disease of the colon and rectum. Alternatives to include barium enema, radiographic evaluation, lab testing or no intervention. Pt verbalizes understanding and agrees.       GIOVANNA Denton

## 2022-08-01 NOTE — TELEPHONE ENCOUNTER
Rx Refill Note  Requested Prescriptions     Pending Prescriptions Disp Refills   • pravastatin (PRAVACHOL) 40 MG tablet [Pharmacy Med Name: PRAVASTATIN 40MG TABLETS] 90 tablet 3     Sig: TAKE 1 TABLET BY MOUTH EVERY NIGHT   • citalopram (CeleXA) 20 MG tablet [Pharmacy Med Name: CITALOPRAM 20MG TABLETS] 90 tablet 3     Sig: TAKE 1 TABLET BY MOUTH DAILY      Last office visit with prescribing clinician: 7/12/2022      Next office visit with prescribing clinician: Visit date not found            Verito Stoll LPN  08/01/22, 14:58 CDT

## 2022-08-22 ENCOUNTER — ANESTHESIA EVENT (OUTPATIENT)
Dept: GASTROENTEROLOGY | Facility: HOSPITAL | Age: 77
End: 2022-08-22

## 2022-08-22 ENCOUNTER — HOSPITAL ENCOUNTER (OUTPATIENT)
Facility: HOSPITAL | Age: 77
Setting detail: HOSPITAL OUTPATIENT SURGERY
Discharge: HOME | End: 2022-08-22
Attending: INTERNAL MEDICINE | Admitting: INTERNAL MEDICINE

## 2022-08-22 ENCOUNTER — ANESTHESIA (OUTPATIENT)
Dept: GASTROENTEROLOGY | Facility: HOSPITAL | Age: 77
End: 2022-08-22

## 2022-08-22 VITALS
WEIGHT: 156 LBS | RESPIRATION RATE: 18 BRPM | BODY MASS INDEX: 26.63 KG/M2 | DIASTOLIC BLOOD PRESSURE: 79 MMHG | HEART RATE: 72 BPM | HEIGHT: 64 IN | SYSTOLIC BLOOD PRESSURE: 137 MMHG | TEMPERATURE: 97.1 F | OXYGEN SATURATION: 92 %

## 2022-08-22 DIAGNOSIS — Z86.010 HISTORY OF ADENOMATOUS POLYP OF COLON: ICD-10-CM

## 2022-08-22 PROCEDURE — 45385 COLONOSCOPY W/LESION REMOVAL: CPT | Performed by: INTERNAL MEDICINE

## 2022-08-22 PROCEDURE — 88305 TISSUE EXAM BY PATHOLOGIST: CPT | Performed by: INTERNAL MEDICINE

## 2022-08-22 PROCEDURE — 25010000002 PROPOFOL 10 MG/ML EMULSION: Performed by: NURSE ANESTHETIST, CERTIFIED REGISTERED

## 2022-08-22 DEVICE — DEV CLIP ENDO RESOLUTION360 CONTRL ROT 235CM: Type: IMPLANTABLE DEVICE | Site: COLON | Status: FUNCTIONAL

## 2022-08-22 RX ORDER — SODIUM CHLORIDE 0.9 % (FLUSH) 0.9 %
10 SYRINGE (ML) INJECTION AS NEEDED
Status: CANCELLED | OUTPATIENT
Start: 2022-08-22

## 2022-08-22 RX ORDER — LIDOCAINE HYDROCHLORIDE 10 MG/ML
0.5 INJECTION, SOLUTION EPIDURAL; INFILTRATION; INTRACAUDAL; PERINEURAL ONCE AS NEEDED
Status: DISCONTINUED | OUTPATIENT
Start: 2022-08-22 | End: 2022-08-22 | Stop reason: HOSPADM

## 2022-08-22 RX ORDER — SODIUM CHLORIDE 0.9 % (FLUSH) 0.9 %
10 SYRINGE (ML) INJECTION EVERY 12 HOURS SCHEDULED
Status: CANCELLED | OUTPATIENT
Start: 2022-08-22

## 2022-08-22 RX ORDER — PROPOFOL 10 MG/ML
VIAL (ML) INTRAVENOUS AS NEEDED
Status: DISCONTINUED | OUTPATIENT
Start: 2022-08-22 | End: 2022-08-22

## 2022-08-22 RX ORDER — PROPOFOL 10 MG/ML
VIAL (ML) INTRAVENOUS AS NEEDED
Status: DISCONTINUED | OUTPATIENT
Start: 2022-08-22 | End: 2022-08-22 | Stop reason: SURG

## 2022-08-22 RX ORDER — SODIUM CHLORIDE 9 MG/ML
100 INJECTION, SOLUTION INTRAVENOUS CONTINUOUS
Status: CANCELLED | OUTPATIENT
Start: 2022-08-22

## 2022-08-22 RX ORDER — LIDOCAINE HYDROCHLORIDE 20 MG/ML
INJECTION, SOLUTION EPIDURAL; INFILTRATION; INTRACAUDAL; PERINEURAL AS NEEDED
Status: DISCONTINUED | OUTPATIENT
Start: 2022-08-22 | End: 2022-08-22 | Stop reason: SURG

## 2022-08-22 RX ORDER — SODIUM CHLORIDE 0.9 % (FLUSH) 0.9 %
10 SYRINGE (ML) INJECTION AS NEEDED
Status: DISCONTINUED | OUTPATIENT
Start: 2022-08-22 | End: 2022-08-22 | Stop reason: HOSPADM

## 2022-08-22 RX ORDER — SODIUM CHLORIDE 9 MG/ML
500 INJECTION, SOLUTION INTRAVENOUS CONTINUOUS PRN
Status: DISCONTINUED | OUTPATIENT
Start: 2022-08-22 | End: 2022-08-22 | Stop reason: HOSPADM

## 2022-08-22 RX ORDER — ONDANSETRON 2 MG/ML
4 INJECTION INTRAMUSCULAR; INTRAVENOUS ONCE AS NEEDED
Status: DISCONTINUED | OUTPATIENT
Start: 2022-08-22 | End: 2022-08-22 | Stop reason: HOSPADM

## 2022-08-22 RX ADMIN — SODIUM CHLORIDE 500 ML: 9 INJECTION, SOLUTION INTRAVENOUS at 08:52

## 2022-08-22 RX ADMIN — LIDOCAINE HYDROCHLORIDE 60 MG: 20 INJECTION, SOLUTION EPIDURAL; INFILTRATION; INTRACAUDAL; PERINEURAL at 09:02

## 2022-08-22 RX ADMIN — PROPOFOL 400 MG: 10 INJECTION, EMULSION INTRAVENOUS at 09:02

## 2022-08-22 NOTE — ANESTHESIA POSTPROCEDURE EVALUATION
"Patient: Nikki SIDDIQUI    Procedure Summary     Date: 08/22/22 Room / Location: [facility] ENDOSCOPY 5 / [facility] ENDOSCOPY    Anesthesia Start: 0858 Anesthesia Stop: 0932    Procedure: COLONOSCOPY WITH ANESTHESIA (N/A ) Diagnosis:       History of adenomatous polyp of colon      (History of adenomatous polyp of colon [Z86.010])    Surgeons: Chapin Valverde MD Provider: Tracey Mejia CRNA    Anesthesia Type: MAC ASA Status: 2          Anesthesia Type: MAC    Vitals  Vitals Value Taken Time   /79 08/22/22 0946   Temp     Pulse 77 08/22/22 0948   Resp 18 08/22/22 0945   SpO2 95 % 08/22/22 0948   Vitals shown include unvalidated device data.        Post Anesthesia Care and Evaluation    Patient location during evaluation: bedside  Patient participation: complete - patient participated  Level of consciousness: awake and awake and alert  Pain score: 0  Pain management: adequate    Airway patency: patent  Anesthetic complications: No anesthetic complications  PONV Status: none  Cardiovascular status: acceptable  Respiratory status: acceptable  Hydration status: acceptable    Comments: Patient discharged according to acceptable Preet score per RN assessment. See nursing records for further information.     Blood pressure 137/79, pulse 72, temperature 97.1 °F (36.2 °C), temperature source Temporal, resp. rate 18, height 162.6 cm (64\"), weight 70.8 kg (156 lb), SpO2 92 %, not currently breastfeeding.        "

## 2022-08-22 NOTE — ANESTHESIA PREPROCEDURE EVALUATION
Anesthesia Evaluation     Patient summary reviewed   no history of anesthetic complications:  NPO Solid Status: > 8 hours             Airway   Mallampati: II  TM distance: >3 FB  Neck ROM: full  Dental      Pulmonary    (-) asthma, sleep apnea, not a smoker  Cardiovascular   Exercise tolerance: good (4-7 METS)    (+) hyperlipidemia,   (-) pacemaker, past MI, cardiac stents, CABG      Neuro/Psych  (-) seizures, CVA  GI/Hepatic/Renal/Endo    (+)  GERD,    (-) liver disease, no renal disease, diabetes    Musculoskeletal     Abdominal    Substance History      OB/GYN          Other      history of cancer                      Anesthesia Plan    ASA 2     MAC     intravenous induction     Anesthetic plan, risks, benefits, and alternatives have been provided, discussed and informed consent has been obtained with: patient.

## 2022-08-22 NOTE — DISCHARGE INSTRUCTIONS
"You had a hemaclip placed today during a polyp removal.  Keep the attached \"[employer] Patient Information Card\" with you for the next 2 weeks.  *IF* you were to need an MRI within the next 2 weeks, present this information to your Radiologist.   "

## 2022-08-25 LAB
CYTO UR: NORMAL
LAB AP CASE REPORT: NORMAL
Lab: NORMAL
PATH REPORT.FINAL DX SPEC: NORMAL
PATH REPORT.GROSS SPEC: NORMAL

## 2022-12-07 ENCOUNTER — LAB (OUTPATIENT)
Dept: FAMILY MEDICINE CLINIC | Facility: CLINIC | Age: 77
End: 2022-12-07

## 2022-12-19 ENCOUNTER — OFFICE VISIT (OUTPATIENT)
Dept: FAMILY MEDICINE CLINIC | Facility: CLINIC | Age: 77
End: 2022-12-19

## 2022-12-19 VITALS
BODY MASS INDEX: 28 KG/M2 | OXYGEN SATURATION: 98 % | HEIGHT: 64 IN | SYSTOLIC BLOOD PRESSURE: 138 MMHG | HEART RATE: 66 BPM | RESPIRATION RATE: 14 BRPM | DIASTOLIC BLOOD PRESSURE: 88 MMHG | WEIGHT: 164 LBS

## 2022-12-19 DIAGNOSIS — R03.0 ELEVATED BLOOD PRESSURE READING: ICD-10-CM
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
## 2022-12-19 DIAGNOSIS — G93.89 BRAIN MASS: ICD-10-CM

## 2022-12-19 DIAGNOSIS — Z00.00 WELLNESS EXAMINATION: ICD-10-CM

## 2022-12-19 DIAGNOSIS — R47.9 DIFFICULTY WITH SPEECH: ICD-10-CM

## 2022-12-19 DIAGNOSIS — L30.9 HAND DERMATITIS: ICD-10-CM

## 2022-12-19 DIAGNOSIS — Z29.8 SEIZURE PROPHYLAXIS: ICD-10-CM

## 2022-12-19 PROCEDURE — 99214 OFFICE O/P EST MOD 30 MIN: CPT | Performed by: FAMILY MEDICINE

## 2022-12-19 PROCEDURE — G0439 PPPS, SUBSEQ VISIT: HCPCS | Performed by: FAMILY MEDICINE

## 2022-12-19 PROCEDURE — 1159F MED LIST DOCD IN RCRD: CPT | Performed by: FAMILY MEDICINE

## 2022-12-19 PROCEDURE — 1170F FXNL STATUS ASSESSED: CPT | Performed by: FAMILY MEDICINE

## 2022-12-19 RX ORDER — SODIUM CHLORIDE 50 MG/ML
SOLUTION OPHTHALMIC
COMMUNITY
Start: 2022-11-18

## 2022-12-19 RX ORDER — PREDNISOLONE ACETATE 10 MG/ML
SUSPENSION/ DROPS OPHTHALMIC
COMMUNITY
Start: 2022-11-27

## 2022-12-19 RX ORDER — OFLOXACIN 3 MG/ML
SOLUTION/ DROPS OPHTHALMIC
COMMUNITY
Start: 2022-11-27

## 2022-12-19 RX ORDER — LEVETIRACETAM 500 MG/1
TABLET ORAL
COMMUNITY
Start: 2022-11-29

## 2022-12-19 NOTE — PATIENT INSTRUCTIONS
"Medicare/insurances offer certain visits called \"wellness/annual\" that allows for time to deal with and  review the many aspects of \"being well\" that just might not get mentioned during other visits with your doctor through the year.  This includes things like reviews of health screenings (mammograms, various labs),  weight, exercise, vaccines for just a few examples.      In order to help you with this we wish to make you aware of a few things for you to consider:    1. Advanced directives.  These are documents used to help direct your care if your health/situation should reach a point that you cannot make your own decisions.  While it is likely you do not currently have a need for these documents now; it is something that we all might face at any time.   The hand outs you are being given today are simply for you to review and use to learn more about these documents and consider them as you wish.      2. Vaccines: Certain vaccines are important after age 50, 60, and 65 and some health situations (for example COPD), require even boosters beyond age 65.  We are happy to review with you your vaccine status and vaccines that might be needed for you at this point:      a. Tetanus.   Like anyone this needs to given every 10 years; sooner for/with lacerations/wounds.   Likely when getting this booster it needs to be a tetanus called Tdap (tetanus mixed with diptheria and pertussis).   Years ago you had this vaccine.  We now know we can lose our immunity to pertussis (a part of this vaccine) and run a risk of catching this.  Now only would this make us ill; but more importantly we can spread this to very young children (and for them it can be a much more dangerous illness).   We call this the grandparent vaccine for this reason.     b. Pneumonia (strept).   This comes now with two brands.   It is recommended to take pneumovax first; and a year later take the cousin prevnar.  Even if you have had these before; we need to " review when and your current health situation/s as you may need boosters and even recently the CDC has made recent/new recommendations for pneumovax.      c. Shingles.  You do not want to catch shingles.  Though you will recover from this; the pain associated with shingles can be severe.  Even if you have had the now older zostavax, or have had shingles; it is recommended you still get the Shingrix (the new vaccine just available early 2018 shingles vaccine).  A new shingles vaccine (a shot to lower your chance of catching shingles) is now available (shingrix).  This vaccine is the second vaccine created for this purpose; (we have had zostavax for years).  Shingrix provides a much better and longer immunity for shingles than zostavax.  For this and other reasons Shingrix can be started at age 50.  If you have had zostavax in the past; you can still take Shingrix.      This vaccine is not paid for in a doctor's office by medicare, medicaid and probably most insurances.  Like zostavax; this is covered in drug stores.  This is a vaccine that if you chose to get you need to get at a drug store that gives vaccines (like [facility], [facility] and [email or web address].      d. Yearly flu vaccine given from September through April each year (there is a special vaccine for those over 65).     e. Travel vaccines:  If you are one to do international travel; be sure and ask us for any particular unusual vaccines you may need.     f.  Miscellaneous:  If you have certain health situations/disease you may need specific/particular vaccines not give to the general public.     g.  Covid: currently recommended everyone over 5.  The brands Pfizer/Moderna are for 3 total shots as immunity will wane from less than this.  Rolando/Rolando has a version that comes with recommendations for an initial vaccine and booster after.  I no longer recommend J&J as a first choice as Pfizer/Moderna are readily available.  If you have had an  initial J&J I recommend you booster with Moderna.   I strongly recommend covid vaccination; being unvaccinated or partially vaccinated carries real risk for disease and even death.     Because of many restrictions on this office always having all the above vaccines; you may be advised to work with your local health department to keep up with your individual vaccine needs.    The vaccines we have on record for you include:   Immunization History   Administered Date(s) Administered    COVID-19 ([facility]) 03/17/2021, 12/14/2021    Fluzone High-Dose 65+yrs 10/19/2021    Tdap 10/19/2021       If you have record of other vaccines and want them to show in your chart here; please talk to our nurses about having your vaccine record updated.     3. Exercise: regular cardio exercise something everyone should consider and try to do; even if health limitations (ie find that exercise UE/LE/cardio that they can tolerate).   Normal weight a goal for everyone (as we discussed)    4. Healthy diet helpful for weight management, illness prevention.     5. If over 50-screening exams include men PSA/rectal exam, women mammograms, and everyone colonoscopy screening for colon cancer.    6. If you use tobacco of any kind or e-products you should stop. We are providing you some information to consider that could make this process easier.      ##################################

## 2022-12-19 NOTE — PROGRESS NOTES
Subjective   Nikki SIDDIQUI is a 77 y.o. female presenting with chief complaint of:   Chief Complaint   Patient presents with   • Medicare Wellness-subsequent     AWV 10.19.21    History of Present Illness :  Alone.  Here for primarily f/u [facility] ER for fall.     Also yearly medicare wellness exam.    Has few chronic problems to consider that might have a bearing on today's issues; not an interval appointment.       Chronic/acute problems reviewed today:   1. Brain mass: found incidental after her fall and ER visit.  Neurology told her they thought it was a meningioma   2. Wellness examination-done: Chronic ongoing over time screening or advice for general health care/wellness.      3. Seizure prophylaxis: put by neuro/Shayy on keppra.  No seizures.    4. Difficulty with speech: ? Before the fall; ? No worse.    5. Elevated blood pressure reading in [place] ER after the fall; since not   6. Hand dermatitis chronic/worse hand rash/soreness; wants refill of valisone that with occlusive dressing works   7 Fall: recent.  She was coming out of the [employer] in air and around here in [place] and it was raining.  She fell forward striking her face.  There was no loss of consciousness.     Has an/another acute issue today: none.    The following portions of the patient's history were reviewed and updated as appropriate: allergies, current medications, past family history, past medical history, past social history, past surgical history and problem list.      Current Outpatient Medications:   •  NON FORMULARY, Provision eye vitamin, Disp: , Rfl:   •  albuterol sulfate  (90 Base) MCG/ACT inhaler, Inhale 2 puffs As Needed for Wheezing or Shortness of Air. (Patient taking differently: Inhale 2 puffs As Needed for Wheezing or Shortness of Air (rare).), Disp: 1 inhaler, Rfl: 3  •  cholecalciferol (VITAMIN D3) 1000 units tablet, Take 1,000 Units by mouth 2 (Two) Times a Day., Disp: , Rfl:   •  citalopram  (CeleXA) 20 MG tablet, TAKE 1 TABLET BY MOUTH DAILY, Disp: 90 tablet, Rfl: 3  •  levETIRAcetam (KEPPRA) 500 MG tablet, , Disp: , Rfl:   •  Tucker 128 5 % ophthalmic solution, INSTILL 1 DROP IN RIGHT EYE THREE TIMES DAILY, Disp: , Rfl:   •  ofloxacin (OCUFLOX) 0.3 % ophthalmic solution, INSTILL 1 DROP TO SURGICAL EYE THREE TIMES DAILY. START 2 DAYS PRIOR TO SURGERY AND USE UNTIL GONE, Disp: , Rfl:   •  omeprazole (priLOSEC) 20 MG capsule, Take 20 mg by mouth Daily., Disp: , Rfl:   •  pravastatin (PRAVACHOL) 40 MG tablet, TAKE 1 TABLET BY MOUTH EVERY NIGHT, Disp: 90 tablet, Rfl: 3  •  prednisoLONE acetate (PRED FORTE) 1 % ophthalmic suspension, SHAKE LIQUID AND INSTILL 1 DROP TO SURGICAL EYE THREE TIMES DAILY. START 2 DAYS BEFORE SYMPTOMS -USE UNTIL GONE, Disp: , Rfl:     No problems with medications.    Allergies   Allergen Reactions   • Dilaudid [Hydromorphone Hcl] Other (See Comments)     Oxygen stats dropped & mental altered status   • Bacitracin-Neomycin-Polymyxin Itching   • Promethazine Hcl Anxiety   • Ace Inhibitors Other (See Comments)     Family history of intolerance.   • Blephamide [Sulfacetamide-Prednisolone] Rash   • Neosporin [Neomycin-Bacitracin Zn-Polymyx] Rash   • Other Rash     Neodecadron eye drop     • Phenergan [Promethazine] Anxiety   • Sulfa Antibiotics Rash       Review of Systems  GENERAL:  Active/slower with limits, speed, stamina for age.  Sleep is ok; no orthopnea. No fever now.  SKIN: No rash/skin lesion of concern unless mentioned above/below.   ENDO:  No syncope, near or diaphoretic sweaty spells.  HEENT: Above head injury; no headache.   No vision change.  No significant hearing loss.  Ears without pain/drainage.  No sore throat.  Same usual/not that significant nasal/sinus congestion/drainage. No epistaxis.  CHEST: No chest wall tenderness or mass.  Same infrequent cough,  without wheeze.  No SOB; no hemoptysis.  CV: No exertional chest pain, palpitations, ankle edema.  GI: No  heartburn, dysphagia.  No abdominal pain, diarrhea, constipation.  No rectal bleeding, or melena.    :  Voids without dysuria, or incontinence to completion.  ORTHO: No painful/swollen joints but various on /off sore  No sore neck or back.  No acute neck or back pain despite recent injury.  NEURO: No dizziness, weakness of extremities.  No numbness/paresthesias.   PSYCH: No memory loss.  Mood good; not anxious, depressed but/and not suicidal.  Tries to tolerate stress .   Screening:  Gyne: none years  Mammogram: no TCC/epic: double masectomy  Bone density: 5.12.20/[facility]/bone density/LS -1.3 hip -0.7; doing very well; no change  Low dose CT chest: Tobacco-smoker/age 17/dc age 25 (<8p): NA  GI: Colon-p, div/Shieben/BH/8.22.22/3y  Prostate: NA  Usual lab order  6m CBC  12m CBC, CMP, LIPID, TSH, Vit D,     Copy/paste function used for ROS/exam AND each area of these were reviewed, updated, confirmed and supplemented as needed.  Data reviewed:   Recent admit/ER/MD visits: [facility]  Last cardiac testing:   Echo: none  Radiology considered:   No radiology results for the last 90 days.      11.29.22 CT head without  IMPRESSION:   1.  No acute traumatic injury.   2.  Partially calcified mass appears to extend from the right superior osseous calvarium with mild mass effect.  MRI with contrast is recommended to further evaluate.     CT facial bones  IMPRESSION:  Soft tissue injury to the left periorbital region, nose and maxilla with no underlying displaced facial bone fracture.     CT C spine  IMPRESSION: Degenerative spondylosis with no acute fracture.     Xray hands  IMPRESSION:   1.  Degenerative arthrosis is noted.  There is lytic change in the distal end of the proximal phalanx left index finger of undetermined etiology.     Xray knee  IMPRESSION:  No acute abnormality or fracture of the right knee.        Lab Results:  Results for orders placed or performed during the hospital encounter of 08/22/22   Tissue  Pathology Exam    Specimen: A: Large Intestine; Polyp    B: Large Intestine; Polyp    C: Large Intestine; Polyp   Result Value Ref Range    Note to Patients       This report may contain a detailed description of human tissue sent by a health care provider to the laboratory for pathologic evaluation. The content of this report is essential for diagnosis and may provide important critical findings. This information may be unfamiliar to patients to review without a medical professional present. It is advised that the patient review this report in the presence of a health care provider who can answer questions and explain the results.      Case Report       Surgical Pathology Report                         Case: [id number]                                  Authorizing Provider:  Chapin Valverde MD      Collected:           08/22/2022 09:16 AM          Ordering Location:     [facility]     Received:            08/22/2022 10:29 AM                                 ENDOSCOPY                                                                    Pathologist:           Jesi Cool MD                                                        Specimens:   1) - Large Intestine, polyp at proximal ascending colon                                             2) - Large Intestine, polyp at 32cm                                                                 3) - Large Intestine, polyps x2 at rectum                                                  Final Diagnosis       1.  Large intestine, proximal ascending colon, polypectomy:  A.  Fragments of tubulovillous adenoma.  B.  No high-grade dysplasia identified.    2.  Large intestine, colon at 32 cm, polypectomy:  A.  Adenomatous polyp, tubular type  B.  No high-grade dysplasia identified.    3.  Large intestine, rectal polyps x2, polypectomy:  A.  Adenomatous polyp, tubular type, negative for high-grade dysplasia.  B.  Hyperplastic polyp.      Gross Description       1.  "Large Intestine.   Received in a formalin filled container labeled with the patient's name, date of birth, and \"polyp at proximal ascending colon\".  The specimen consists of multiple red-tan soft tissue fragments aggregating to 1.6 x 1.0 x 0.3 cm.  The fragments are totally submitted in block 1A.      2. Large Intestine.   Received in a formalin filled container labeled with the patient's name, date of birth, and \"polyp at 32 cm\".  The specimen consists of 1 red-brown soft tissue polyp measuring 0.8 x 0.6 x 0.4 cm.  The polyp is bisected and totally submitted in block 2A.      3. Large Intestine.   Received in a formalin filled container labeled with the patient's name, date of birth, and \"polyps x2 at rectum\".  The specimen consists of 2 tan-pink soft tissue polyps aggregating to 0.6 x 0.4 x 0.2 cm, totally submitted in block 3A.          Microscopic Description       Microscopic examination was performed.         A1C:  Lab Results - Last 18 Months   Lab Units 05/09/22  0718 10/11/21  0708   HEMOGLOBIN A1C % 5.90* 5.55     GLUCOSE:  Lab Results - Last 18 Months   Lab Units 05/09/22  0718 10/11/21  0708   GLUCOSE mg/dL 96 99     LIPID:  Lab Results - Last 18 Months   Lab Units 05/09/22  0718   CHOLESTEROL mg/dL 191   LDL CHOL mg/dL 115*   HDL CHOL mg/dL 54   TRIGLYCERIDES mg/dL 123     PSA:No results for input(s): PSA in the last 05245 hours.    CBC:  Lab Results - Last 18 Months   Lab Units 05/09/22  0718 10/11/21  0708   WBC 10*3/mm3 5.43 5.20   HEMOGLOBIN g/dL 14.2 14.2   HEMATOCRIT % 44.3 45.5   PLATELETS 10*3/mm3 232 237      BMP/CMP:  Lab Results - Last 18 Months   Lab Units 05/09/22  0718 10/11/21  0708   SODIUM mmol/L 140 141   POTASSIUM mmol/L 4.2 4.4   CHLORIDE mmol/L 101 102   TOTAL CO2 mmol/L 27.2 27.7   GLUCOSE mg/dL 96 99   BUN mg/dL 14 9   CREATININE mg/dL 0.64 0.70   EGFR IF NONAFRICN AM mL/min/1.73  --  81   EGFR IF AFRICN AM mL/min/1.73  --  98   EGFR RESULT mL/min/1.73 90.6  --    CALCIUM mg/dL " "9.7 9.7     HEPATIC:  Lab Results - Last 18 Months   Lab Units 05/09/22  0718   ALT (SGPT) U/L 19   AST (SGOT) U/L 21   ALK PHOS U/L 69     Vit D:  Lab Results - Last 18 Months   Lab Units 05/09/22  0718   VIT D 25 HYDROXY ng/ml 60.5     THYROID:  Lab Results - Last 18 Months   Lab Units 05/09/22  0718   TSH uIU/mL 3.490       Objective   /88   Pulse 66   Resp 14   Ht 162.6 cm (64\")   Wt 74.4 kg (164 lb)   LMP  (LMP Unknown)   SpO2 98%   BMI 28.15 kg/m²   Body mass index is 28.15 kg/m².    Recent Vitals       8/22/2022 8/22/2022 8/22/2022       BP: 136/68 131/63 137/79     Pulse: 86 82 72         Wt Readings from Last 15 Encounters:   12/19/22 1415 74.4 kg (164 lb)   08/22/22 0824 70.8 kg (156 lb)   08/01/22 1314 72.6 kg (160 lb)   07/12/22 1116 71.7 kg (158 lb)   05/12/22 1353 71.8 kg (158 lb 3.2 oz)   10/26/21 0900 72.9 kg (160 lb 12.8 oz)   10/19/21 1059 71.7 kg (158 lb)   09/14/21 0859 72.6 kg (160 lb)   08/02/21 1329 73 kg (161 lb)   07/16/21 1040 71.7 kg (158 lb)   07/07/21 1343 71.7 kg (158 lb)   06/10/21 0955 70.8 kg (156 lb)   05/03/21 0934 71.7 kg (158 lb)   04/05/21 0915 72.1 kg (159 lb)   03/05/21 0932 72.6 kg (160 lb)       Physical Exam  GENERAL:  Well nourished/developed in no acute distress.   SKIN: Turgor ok without wound, rash, lesion beyond very dry palms/hands. Few cracks/fissues.   HEENT: Normal cephalic without trauma.  Pupils equal round reactive to light. Extraocular motions full without nystagmus.   External canals nonobstructive nontender without reddness. Tymphatic membranes jenny with celia structures intact.   Oral cavity without growths, exudates, and moist.  Posterior pharynx without mass, obstruction, redness.  No thyromegaly, mass, tenderness, lymphadenopathy and supple.  CV: Regular rhythm.  No murmur, gallop, edema. Posterior pulses intact.  No carotid bruits.  CHEST: No chest wall tenderness or mass.   LUNGS: Symmetric motion with clear to auscultation.    ABD: Soft, " nontender without mass.   ORTHO: Symmetric extremities without swelling/point tenderness.  Full gross range of motion.  NEURO: CN 2-12 grossly intact.  Symmetric facies and UE/LE. 3/5 strength throughout.  Nonfocal use extremities. Speech mildly hoarse.   PSYCH: Oriented x 3.  Pleasant calm, well kept.  Purposeful/directed conservation with intact short/long gross memory.     Assessment & Plan     1. Brain mass    2. Wellness examination-done    3. Seizure prophylaxis    4. Difficulty with speech    5. Elevated blood pressure reading    6. Hand dermatitis      Issues that are new, uncontrolled, or required review HPI/ROS/exam and decisions beyond wellness today:   Fall with injuries; appears to be slowly recovering  Hand dermatitis not controlled  New brain lesion; likely meningioma and likely will not need surgery  Speech issues; could be from meningioma and/or fall; to observe  bp decision; appears to not need treatment right now and elevation in ER not unexpected with stress    Discussions/medical decisions/reviews:  BP ok  Other vitals ok  DM/BS 5.90 5.9.22  Lipid  5.9.22 pravachol  PSA NA  CBC ok 5.9.22  Renal ok 5.9.22  Liver ok 5.9.22  Vit D 60 5.9.22  Thyroid TSH ok 5.9.22    Data review above:   Rx: reviewed and decisions:   Rx new/changes:   New Medications Ordered This Visit   Medications   • betamethasone valerate (VALISONE) 0.1 % ointment     Sig: Apply 1 application topically to the appropriate area as directed 2 (Two) Times a Day.     Dispense:  30 g     Refill:  0     Check on labs from 8.2022 as she says done and nothing in epic    Orders placed:   LAB/Testing/Referrals: reviewed/orders:   Today:   No orders of the defined types were placed in this encounter.    Chronic/recurrent labs above or change to:   Same       Wellness/or annual done   Screening reviewed/updated     Immunization History   Administered Date(s) Administered   • COVID-19 ([facility]) 03/17/2021, 12/14/2021   • Fluzone  "High-Dose 65+yrs 10/19/2021   • Tdap 10/19/2021     Vaccine reviewed: today none; later we advised/reaffirmed our support/suggestion for staying complete with covid- covid boosters, seasonal flu/yearly and any missing vaccine from list we supplied; we suggest contact with local health department office to review missing/needed vaccines and then bring nursing documentation for these vaccines to this office.     Health maintenance:   Body mass index is 28.15 kg/m².  BMI is >= 25 and <30. (Overweight) The following options were offered after discussion;: exercise counseling/recommendations and nutrition counseling/recommendations      Tobacco use reviewed:   Nikki SIDDIQUI  reports that she quit smoking about 53 years ago. Her smoking use included cigarettes. She started smoking about 60 years ago. She smoked an average of .5 packs per day. She has never used smokeless tobacco..      Annual/wellness also done today.  Issues as appropriate discussed as counseling, anticipatory guidance:   Nutrition, physical activity, healthy weight, injury prevention, misuse of tobacco, alcohol and drugs, sexual behavior and STDs, contraception, dental health, mental health, immunizations, screenings as appropriate. As appropriate see AVS.      Patient Instructions     Medicare/insurances offer certain visits called \"wellness/annual\" that allows for time to deal with and  review the many aspects of \"being well\" that just might not get mentioned during other visits with your doctor through the year.  This includes things like reviews of health screenings (mammograms, various labs),  weight, exercise, vaccines for just a few examples.      In order to help you with this we wish to make you aware of a few things for you to consider:    1. Advanced directives.  These are documents used to help direct your care if your health/situation should reach a point that you cannot make your own decisions.  While it is likely you do not currently have a " need for these documents now; it is something that we all might face at any time.   The hand outs you are being given today are simply for you to review and use to learn more about these documents and consider them as you wish.      2. Vaccines: Certain vaccines are important after age 50, 60, and 65 and some health situations (for example COPD), require even boosters beyond age 65.  We are happy to review with you your vaccine status and vaccines that might be needed for you at this point:      a. Tetanus.   Like anyone this needs to given every 10 years; sooner for/with lacerations/wounds.   Likely when getting this booster it needs to be a tetanus called Tdap (tetanus mixed with diptheria and pertussis).   Years ago you had this vaccine.  We now know we can lose our immunity to pertussis (a part of this vaccine) and run a risk of catching this.  Now only would this make us ill; but more importantly we can spread this to very young children (and for them it can be a much more dangerous illness).   We call this the grandparent vaccine for this reason.     b. Pneumonia (strept).   This comes now with two brands.   It is recommended to take pneumovax first; and a year later take the cousin prevnar.  Even if you have had these before; we need to review when and your current health situation/s as you may need boosters and even recently the CDC has made recent/new recommendations for pneumovax.      c. Shingles.  You do not want to catch shingles.  Though you will recover from this; the pain associated with shingles can be severe.  Even if you have had the now older zostavax, or have had shingles; it is recommended you still get the Shingrix (the new vaccine just available early 2018 shingles vaccine).  A new shingles vaccine (a shot to lower your chance of catching shingles) is now available (shingrix).  This vaccine is the second vaccine created for this purpose; (we have had zostavax for years).  Shingrix provides a  much better and longer immunity for shingles than zostavax.  For this and other reasons Shingrix can be started at age 50.  If you have had zostavax in the past; you can still take Shingrix.      This vaccine is not paid for in a doctor's office by medicare, medicaid and probably most insurances.  Like zostavax; this is covered in drug stores.  This is a vaccine that if you chose to get you need to get at a drug store that gives vaccines (like "[employer], [facility] and [facility].      d. Yearly flu vaccine given from September through April each year (there is a special vaccine for those over 65).     e. Travel vaccines:  If you are one to do international travel; be sure and ask us for any particular unusual vaccines you may need.     f.  Miscellaneous:  If you have certain health situations/disease you may need specific/particular vaccines not give to the general public.     g.  Covid: currently recommended everyone over 5.  The brands Pfizer/Moderna are for 3 total shots as immunity will wane from less than this.  Yakarouler/Yakarouler has a version that comes with recommendations for an initial vaccine and booster after.  I no longer recommend J&J as a first choice as Pfizer/Moderna are readily available.  If you have had an initial J&J I recommend you booster with Moderna.   I strongly recommend covid vaccination; being unvaccinated or partially vaccinated carries real risk for disease and even death.     Because of many restrictions on this office always having all the above vaccines; you may be advised to work with your local health department to keep up with your individual vaccine needs.    The vaccines we have on record for you include:   Immunization History   Administered Date(s) Administered   • COVID-19 ([facility]) 03/17/2021, 12/14/2021   • Fluzone High-Dose 65+yrs 10/19/2021   • Tdap 10/19/2021       If you have record of other vaccines and want them to show in your chart here; please talk  to our nurses about having your vaccine record updated.     3. Exercise: regular cardio exercise something everyone should consider and try to do; even if health limitations (ie find that exercise UE/LE/cardio that they can tolerate).   Normal weight a goal for everyone (as we discussed)    4. Healthy diet helpful for weight management, illness prevention.     5. If over 50-screening exams include men PSA/rectal exam, women mammograms, and everyone colonoscopy screening for colon cancer.    6. If you use tobacco of any kind or e-products you should stop. We are providing you some information to consider that could make this process easier.      ##################################              Visit today involved chronic significant medical problems or differentials and/or intensive drug monitoring: ie potential to cause serious morbidity or death:     Follow up: Return for lab/Dr Carrillo as planned or needed.  No future appointments.            Subsequent Medicare Wellness Visit    HEALTH RISK ASSESSMENT    : 1945    Recent Hospitalizations:  No hospitalization(s) within the last year..  ccc      Current Medical Providers:  Patient Care Team:  Gael Carrilol MD as PCP - General (Family Medicine)  Chapin Valverde MD as Consulting Physician (Gastroenterology)        Smoking Status:  Social History     Tobacco Use   Smoking Status Former   • Packs/day: 0.50   • Types: Cigarettes   • Start date: 1962   • Quit date: 1969   • Years since quittin.9   Smokeless Tobacco Never       Alcohol Consumption:  Social History     Substance and Sexual Activity   Alcohol Use No       Depression Screen:   PHQ-2/PHQ-9 Depression Screening 2022   Retired PHQ-9 Total Score -   Retired Total Score -   Little Interest or Pleasure in Doing Things 0-->not at all   Feeling Down, Depressed or Hopeless 0-->not at all   PHQ-9: Brief Depression Severity Measure Score 0       Health Habits and Functional and  Cognitive Screening:  Functional & Cognitive Status 12/19/2022   Do you have difficulty preparing food and eating? No   Do you have difficulty bathing yourself, getting dressed or grooming yourself? No   Do you have difficulty using the toilet? No   Do you have difficulty moving around from place to place? No   Do you have trouble with steps or getting out of a bed or a chair? No   Current Diet Well Balanced Diet   Dental Exam Not up to date   Eye Exam Up to date   Exercise (times per week) 3 times per week   Current Exercises Include Walking   Do you need help using the phone?  No   Are you deaf or do you have serious difficulty hearing?  No   Do you need help with transportation? No   Do you need help shopping? No   Do you need help preparing meals?  No   Do you need help with housework?  No   Do you need help with laundry? No   Do you need help taking your medications? No   Do you need help managing money? No   Do you ever drive or ride in a car without wearing a seat belt? No   Have you felt unusual stress, anger or loneliness in the last month? Yes   Who do you live with? Alone   If you need help, do you have trouble finding someone available to you? No   Have you been bothered in the last four weeks by sexual problems? No   Do you have difficulty concentrating, remembering or making decisions? No           Does the patient have evidence of cognitive impairment? No    Asiprin use counseling: Does not need ASA (and currently is not on it)        Age-appropriate Screening Schedule:  Refer to the list below for future screening recommendations based on patient's age, sex and/or medical conditions. Orders for these recommended tests are listed in the plan section. The patient has been provided with a written plan.    Health Maintenance   Topic Date Due   • ZOSTER VACCINE (1 of 2) Never done   • DXA SCAN  05/12/2022   • INFLUENZA VACCINE  08/01/2022   • LIPID PANEL  05/09/2023   • TDAP/TD VACCINES (3 - Td or Tdap)  11/29/2032   • MAMMOGRAM  Discontinued        Subjective   History of Present Illness    Patient Active Problem List   Diagnosis   • Abnormal x-ray- chest CT-resolved   • Gastroesophageal reflux disease   • Chronic back pain   • Osteopenia bd 2020- ?2y   • Menopause   • Hyperlipidemia-statin   • Wellness examination-done   • Anxiety   • Depression   • Hx of colonic polyp   • Vitamin D deficiency   • Laboratory test   • Status post mastectomy, bilateral   • Hx Breast cancer   • Elevated fasting glucose-4.3.19   • Chronic cough-reflux   • History of COVID-19   • Hand dermatitis       Advance Care Planning:  ACP discussion was held with the patient during this visit. Patient has an advance directive (not in EMR), copy requested.    Identification of Risk Factors:  Risk factors include: Colon Cancer Screening  Fall Risk  Immunizations Discussed/Encouraged (specific immunizations; Tdap, Influenza, Prevnar 20 (Pneumococcal 20-valent conjugate), Shingrix and COVID19 ).    Compared to one year ago, the patient feels her physical health is worse.  Compared to one year ago, the patient feels her mental health is the same.      Assessment & Plan   Patient Self-Management and Personalized Health Advice  The patient has been provided with information about: diet and exercise and preventive services including:   · Annual Wellness Visit (AWV).    Reviewed use of high risk medication in the elderly: yes  Reviewed for potential of harmful drug interactions in the elderly: yes    An After Visit Summary and PPPS with all of these plans were given to the patient.

## 2023-01-05 ENCOUNTER — TELEPHONE (OUTPATIENT)
Dept: FAMILY MEDICINE CLINIC | Facility: CLINIC | Age: 78
End: 2023-01-05
Payer: MEDICARE

## 2023-01-05 NOTE — TELEPHONE ENCOUNTER
Caller: Nikki SIDDIQUI    Relationship: Self    Best call back number: [phone]    What is the best time to reach you:   ANYTIME     Who are you requesting to speak with (clinical staff, provider,  specific staff member):   CHERRI OR PCP    Do you know the name of the person who called:   NIKKI SIDDIQUI    What was the call regarding:   PATIENT CALLED TO CHECK THE STATUS OF LAB ORDERS DONE ON 12/17/22     Do you require a callback:   YES    PATIENT REQUESTED CALL BACK PRIOR TO 12:30P

## 2023-01-05 NOTE — TELEPHONE ENCOUNTER
"Called pt and advised that I do not see the results in her chart, or on the [facility] site, advised that the \"man leticia my labs\"    Im seeing Dr Susie Nelson [facility]    Ph [phone], fax [phone]    I do have a video conference with a Dr Hayes at [facility] as I had a MRI at [facility] after I fell and they seen something, please tell Dr Carrillo  "

## 2023-01-25 ENCOUNTER — TELEPHONE (OUTPATIENT)
Dept: FAMILY MEDICINE CLINIC | Facility: CLINIC | Age: 78
End: 2023-01-25
Payer: MEDICARE

## 2023-01-25 NOTE — TELEPHONE ENCOUNTER
"We checked [facility] for results the day she was here, then she called last month and again I looked and found nothing, pt knew what day it was as she got her hair done and the  \"bald man told me to come on in the lab\" and unable to locate the results?  "

## 2023-01-25 NOTE — TELEPHONE ENCOUNTER
Verito, how can I help with this in Dr. Carrillo's absence?  I too checked with [facility] and found no results for the DOS.  If she needs labs redrawn, I am happy to order for her.  I would just need to know what she is needing if different from her regular lab order here.  Looks like she will be due for the 12m labs in May so could go ahead with the 12m lab now a little early and get the cycle reset and do the 6m labs later this year.    6m CBC, BMP, A1c  12m CBC, CMP, A1c,LIPID, TSH, Vit D    Electronically signed by GIOVANNA Saenz, 01/25/23, 3:39 PM CST.

## 2023-08-03 ENCOUNTER — TELEPHONE (OUTPATIENT)
Dept: FAMILY MEDICINE CLINIC | Facility: CLINIC | Age: 78
End: 2023-08-03
Payer: MEDICARE

## 2023-08-03 DIAGNOSIS — E78.5 HYPERLIPIDEMIA, UNSPECIFIED HYPERLIPIDEMIA TYPE: Chronic | ICD-10-CM

## 2023-08-03 DIAGNOSIS — F32.A DEPRESSION, UNSPECIFIED DEPRESSION TYPE: Chronic | ICD-10-CM

## 2023-08-03 DIAGNOSIS — F41.9 ANXIETY: Chronic | ICD-10-CM

## 2023-08-03 RX ORDER — PRAVASTATIN SODIUM 40 MG
40 TABLET ORAL NIGHTLY
Qty: 90 TABLET | Refills: 3 | Status: SHIPPED | OUTPATIENT
Start: 2023-08-03

## 2023-08-03 RX ORDER — CITALOPRAM 20 MG/1
20 TABLET ORAL DAILY
Qty: 90 TABLET | Refills: 3 | Status: SHIPPED | OUTPATIENT
Start: 2023-08-03

## 2023-11-06 ENCOUNTER — TELEPHONE (OUTPATIENT)
Dept: FAMILY MEDICINE CLINIC | Facility: CLINIC | Age: 78
End: 2023-11-06
Payer: MEDICARE

## 2023-11-06 NOTE — TELEPHONE ENCOUNTER
Spoke with pharmacist at [facility] and she will get them all closed out, patient notified

## 2023-11-06 NOTE — TELEPHONE ENCOUNTER
PATIENT KEEPS GETTING REFILL CALLS FROM [facility] IN [place]. PATIENT STATED SHE IS NO LONGER A PATIENT OF DR GODWIN SO THE REFILLS THAT ARE ON FILE AT THAT [facility] NEEDS TO BE CANCELED. SHE HAS TRIED TO CONTACT THEM HERSELF BUT HASN'T BEEN ABLE TO GET A LIVE PERSON ON THE PHONE SHE IS REQUESTING DR GODWIN OR HIS NURSE TO CANCEL ANY REMAINING REFILLS SHE HAS AT [facility] IN [place].

## 2025-07-22 ENCOUNTER — OFFICE VISIT (OUTPATIENT)
Dept: GASTROENTEROLOGY | Facility: CLINIC | Age: 80
End: 2025-07-22
Payer: MEDICARE

## 2025-07-22 VITALS
DIASTOLIC BLOOD PRESSURE: 90 MMHG | TEMPERATURE: 96.8 F | HEIGHT: 64 IN | OXYGEN SATURATION: 96 % | HEART RATE: 71 BPM | WEIGHT: 161 LBS | SYSTOLIC BLOOD PRESSURE: 170 MMHG | BODY MASS INDEX: 27.49 KG/M2

## 2025-07-22 DIAGNOSIS — Z86.0101 HISTORY OF ADENOMATOUS POLYP OF COLON: Primary | ICD-10-CM

## 2025-07-22 RX ORDER — CITALOPRAM HYDROBROMIDE 10 MG/1
10 TABLET ORAL DAILY
COMMUNITY

## 2025-07-22 NOTE — PROGRESS NOTES
[facility]    Primary Physician Crystal López PA-C    7/22/2025    Nikki SIDDIQUI   1945      Chief Complaint   Patient presents with    Colonoscopy       Subjective     HPI    Nikki SIDDIQUI is a 80 y.o. female who presents as a referral for preventative maintenance. She has no complaints of nausea or vomiting. No change in bowels. No wt loss. No BRBPR. No melena. No abdominal pain.         COLONOSCOPY (08/22/2022 08:52) recall 3 years.   Tissue Pathology Exam (08/22/2022 09:16) tubular adenomatous.   No family history of colon cancer/polyps.     Past Medical History:   Diagnosis Date    Cancer     breast cancer x 3 2003 - 2012 was last mastectomy    Colon polyp     Hyperlipidemia     Meningioma, cerebral        Past Surgical History:   Procedure Laterality Date    APPENDECTOMY      CHOLECYSTECTOMY      COLONOSCOPY  12/16/2011    COLONOSCOPY N/A 07/28/2017    Procedure: COLONOSCOPY WITH ANESTHESIA;  Surgeon: Chapin Valverde MD;  Location:  [facility];  Service:     COLONOSCOPY  2002    COLONOSCOPY N/A 08/22/2022    Procedure: COLONOSCOPY WITH ANESTHESIA;  Surgeon: Chapin Valverde MD;  Location: [facility] ENDOSCOPY;  Service: Gastroenterology;  Laterality: N/A;  pre: hx polyps  post: polyps. clip x1. diverticulosis  Gael Carrillo MD    DILATION AND CURETTAGE, DIAGNOSTIC / THERAPEUTIC      MASTECTOMY      lumpectomy 2003 right and had xrt,  11/2004 right ,1/2012 left,     TONSILLECTOMY         Outpatient Medications Marked as Taking for the 7/22/25 encounter (Office Visit) with Saba Vogt APRN   Medication Sig Dispense Refill    albuterol sulfate  (90 Base) MCG/ACT inhaler Inhale 2 puffs As Needed for Wheezing or Shortness of Air. (Patient taking differently: Inhale 2 puffs As Needed for Wheezing or Shortness of Air (rare).) 1 inhaler 3    betamethasone valerate (VALISONE) 0.1 % ointment Apply 1 application topically to the appropriate area as directed 2  (Two) Times a Day. (Patient taking differently: Apply 1 Application topically to the appropriate area as directed As Needed.) 30 g 0    cholecalciferol (VITAMIN D3) 1000 units tablet Take 1 tablet by mouth 2 (Two) Times a Day.      citalopram (CeleXA) 10 MG tablet Take 1 tablet by mouth Daily.      citalopram (CeleXA) 20 MG tablet TAKE 1 TABLET BY MOUTH DAILY 90 tablet 3    NON FORMULARY Provision eye vitamin      omeprazole (priLOSEC) 20 MG capsule Take 1 capsule by mouth Daily.      pravastatin (PRAVACHOL) 40 MG tablet Take 1 tablet by mouth Every Night. 90 tablet 3       Allergies   Allergen Reactions    Dilaudid [Hydromorphone Hcl] Other (See Comments)     Oxygen stats dropped & mental altered status    Bacitracin-Neomycin-Polymyxin Itching    Promethazine Hcl Anxiety    Ace Inhibitors Other (See Comments)     Family history of intolerance.    Blephamide [Sulfacetamide-Prednisolone] Rash    Neosporin [Neomycin-Bacitracin Zn-Polymyx] Rash    Other Rash     Neodecadron eye drop      Phenergan [Promethazine] Anxiety    Sulfa Antibiotics Rash       Social History     Socioeconomic History    Marital status:      Spouse name:     Number of children: 2    Years of education: 13   Tobacco Use    Smoking status: Former     Current packs/day: 0.00     Average packs/day: 0.5 packs/day for 6.8 years (3.4 ttl pk-yrs)     Types: Cigarettes     Start date: 1962     Quit date: 1969     Years since quittin.5    Smokeless tobacco: Never   Vaping Use    Vaping status: Never Used   Substance and Sexual Activity    Alcohol use: No    Drug use: No    Sexual activity: Defer       Family History   Problem Relation Age of Onset    Breast cancer Mother     Heart valve disorder Mother     Brain cancer Father     Hypertension Sister     Hyperlipidemia Sister     Breast cancer Sister     Hyperlipidemia Sister     Hypertension Sister     Breast cancer Maternal Grandmother     Colon cancer Neg Hx        Review of  Systems   Constitutional:  Negative for chills, fever and unexpected weight change.   Respiratory:  Negative for shortness of breath.    Cardiovascular:  Negative for chest pain.   Gastrointestinal:  Negative for abdominal distention, abdominal pain, anal bleeding, blood in stool, constipation, diarrhea, nausea and vomiting.       Objective     Vitals:    07/22/25 0947   BP: 170/90   Pulse: 71   Temp: 96.8 °F (36 °C)   SpO2: 96%         07/22/25 0947   Weight: 73 kg (161 lb)     Body mass index is 27.64 kg/m².    Physical Exam  Vitals reviewed.   Constitutional:       General: She is not in acute distress.  Cardiovascular:      Rate and Rhythm: Normal rate and regular rhythm.      Heart sounds: Normal heart sounds.   Pulmonary:      Effort: Pulmonary effort is normal.      Breath sounds: Normal breath sounds.   Abdominal:      General: Bowel sounds are normal. There is no distension.      Palpations: Abdomen is soft.      Tenderness: There is no abdominal tenderness.   Skin:     General: Skin is warm and dry.   Neurological:      Mental Status: She is alert.         Imaging Results (Most Recent)       None            Assessment & Plan     Diagnoses and all orders for this visit:    1. History of adenomatous polyp of colon (Primary)  -     Case Request; Standing  -     Case Request    Other orders  -     Implement Anesthesia Orders Day of Procedure; Standing  -     Follow Anesthesia Guidelines / Protocol; Future  -     Obtain Informed Consent; Standing        Schedule colonoscopy. Miralax prep.                COLONOSCOPY WITH ANESTHESIA (N/A)  All risks, benefits, alternatives, and indications of colonoscopy procedure have been discussed with the patient. Risks to include perforation of the colon requiring possible surgery or colostomy, risk of bleeding from biopsies or removal of colon tissue, possibility of missing a colon polyp or cancer, or adverse drug reaction.  Benefits to include the diagnosis and management  of disease of the colon and rectum. Alternatives to include barium enema, radiographic evaluation, lab testing or no intervention. Pt verbalizes understanding and agrees.     There are no Patient Instructions on file for this visit.    Saba Vogt, APRN

## 2025-08-09 ENCOUNTER — ANESTHESIA EVENT (OUTPATIENT)
Dept: GASTROENTEROLOGY | Facility: HOSPITAL | Age: 80
End: 2025-08-09
Payer: MEDICARE

## 2025-08-11 ENCOUNTER — HOSPITAL ENCOUNTER (OUTPATIENT)
Facility: HOSPITAL | Age: 80
Setting detail: HOSPITAL OUTPATIENT SURGERY
Discharge: HOME | End: 2025-08-11
Attending: INTERNAL MEDICINE | Admitting: INTERNAL MEDICINE
Payer: MEDICARE

## 2025-08-11 ENCOUNTER — ANESTHESIA (OUTPATIENT)
Dept: GASTROENTEROLOGY | Facility: HOSPITAL | Age: 80
End: 2025-08-11
Payer: MEDICARE

## 2025-08-11 VITALS
SYSTOLIC BLOOD PRESSURE: 151 MMHG | RESPIRATION RATE: 18 BRPM | HEART RATE: 64 BPM | TEMPERATURE: 96.7 F | WEIGHT: 159 LBS | HEIGHT: 64 IN | DIASTOLIC BLOOD PRESSURE: 67 MMHG | OXYGEN SATURATION: 93 % | BODY MASS INDEX: 27.14 KG/M2

## 2025-08-11 DIAGNOSIS — Z86.0101 HISTORY OF ADENOMATOUS POLYP OF COLON: ICD-10-CM

## 2025-08-11 PROCEDURE — 88305 TISSUE EXAM BY PATHOLOGIST: CPT | Performed by: INTERNAL MEDICINE

## 2025-08-11 PROCEDURE — 25010000002 LIDOCAINE PF 2% 2 % SOLUTION: Performed by: NURSE ANESTHETIST, CERTIFIED REGISTERED

## 2025-08-11 PROCEDURE — 25810000003 SODIUM CHLORIDE 0.9 % SOLUTION: Performed by: NURSE ANESTHETIST, CERTIFIED REGISTERED

## 2025-08-11 PROCEDURE — 25010000002 PROPOFOL 10 MG/ML EMULSION: Performed by: NURSE ANESTHETIST, CERTIFIED REGISTERED

## 2025-08-11 PROCEDURE — 25810000003 SODIUM CHLORIDE 0.9 % SOLUTION: Performed by: ANESTHESIOLOGY

## 2025-08-11 RX ORDER — ONDANSETRON 2 MG/ML
4 INJECTION INTRAMUSCULAR; INTRAVENOUS ONCE AS NEEDED
Status: DISCONTINUED | OUTPATIENT
Start: 2025-08-11 | End: 2025-08-11 | Stop reason: HOSPADM

## 2025-08-11 RX ORDER — SODIUM CHLORIDE 0.9 % (FLUSH) 0.9 %
10 SYRINGE (ML) INJECTION AS NEEDED
Status: DISCONTINUED | OUTPATIENT
Start: 2025-08-11 | End: 2025-08-11 | Stop reason: HOSPADM

## 2025-08-11 RX ORDER — SODIUM CHLORIDE 9 MG/ML
INJECTION, SOLUTION INTRAVENOUS CONTINUOUS PRN
Status: DISCONTINUED | OUTPATIENT
Start: 2025-08-11 | End: 2025-08-11 | Stop reason: SURG

## 2025-08-11 RX ORDER — SODIUM CHLORIDE 9 MG/ML
500 INJECTION, SOLUTION INTRAVENOUS ONCE
Status: COMPLETED | OUTPATIENT
Start: 2025-08-11 | End: 2025-08-11

## 2025-08-11 RX ORDER — PROPOFOL 10 MG/ML
VIAL (ML) INTRAVENOUS AS NEEDED
Status: DISCONTINUED | OUTPATIENT
Start: 2025-08-11 | End: 2025-08-11 | Stop reason: SURG

## 2025-08-11 RX ORDER — LIDOCAINE HYDROCHLORIDE 20 MG/ML
INJECTION, SOLUTION EPIDURAL; INFILTRATION; INTRACAUDAL; PERINEURAL AS NEEDED
Status: DISCONTINUED | OUTPATIENT
Start: 2025-08-11 | End: 2025-08-11 | Stop reason: SURG

## 2025-08-11 RX ADMIN — LIDOCAINE HYDROCHLORIDE 40 MG: 20 INJECTION, SOLUTION EPIDURAL; INFILTRATION; INTRACAUDAL; PERINEURAL at 08:51

## 2025-08-11 RX ADMIN — SODIUM CHLORIDE 500 ML: 9 INJECTION, SOLUTION INTRAVENOUS at 08:21

## 2025-08-11 RX ADMIN — PROPOFOL 450 MG: 10 INJECTION, EMULSION INTRAVENOUS at 08:51

## 2025-08-11 RX ADMIN — SODIUM CHLORIDE: 9 INJECTION, SOLUTION INTRAVENOUS at 08:36

## 2025-08-12 LAB
CYTO UR: NORMAL
LAB AP CASE REPORT: NORMAL
Lab: NORMAL
PATH REPORT.FINAL DX SPEC: NORMAL
PATH REPORT.GROSS SPEC: NORMAL

## (undated) DEVICE — Device: Brand: DEFENDO AIR/WATER/SUCTION AND BIOPSY VALVE

## (undated) DEVICE — YANKAUER,BULB TIP WITH VENT: Brand: ARGYLE

## (undated) DEVICE — MASK,OXYGEN,MED CONC,ADLT,7' TUB, UC: Brand: PENDING

## (undated) DEVICE — TBG SMPL FLTR LINE NASL 02/C02 A/ BX/100

## (undated) DEVICE — THE SINGLE USE ETRAP – POLYP TRAP IS USED FOR SUCTION RETRIEVAL OF ENDOSCOPICALLY REMOVED POLYPS.: Brand: ETRAP

## (undated) DEVICE — SNAR POLYP SENSATION MICRO OVL 13 240X40

## (undated) DEVICE — THE CHANNEL CLEANING BRUSH IS A NYLON FLEXI BRUSH ATTACHED TO A FLEXIBLE PLASTIC SHEATH DESIGNED TO SAFELY REMOVE DEBRIS FROM FLEXIBLE ENDOSCOPES.

## (undated) DEVICE — ARGYLE YANKAUER BULB TIP WITH VENT: Brand: ARGYLE

## (undated) DEVICE — DEFENDO AIR WATER SUCTION AND BIOPSY VALVE KIT: Brand: DEFENDO AIR/WATER/SUCTION AND BIOPSY VALVE

## (undated) DEVICE — MSK O2 MD CONCENTR A/ LF 7FT 1P/U

## (undated) DEVICE — SENSR O2 OXIMAX FNGR A/ 18IN NONSTR

## (undated) DEVICE — Device: Brand: SINGLE USE ELECTROSURGICAL SNARE SD-400

## (undated) DEVICE — ENDOGATOR AUXILIARY WATER JET CONNECTOR: Brand: ENDOGATOR

## (undated) DEVICE — CUFF,BP,DISP,1 TUBE,ADULT,HP: Brand: MEDLINE